# Patient Record
Sex: MALE | Race: BLACK OR AFRICAN AMERICAN | NOT HISPANIC OR LATINO | Employment: FULL TIME | URBAN - METROPOLITAN AREA
[De-identification: names, ages, dates, MRNs, and addresses within clinical notes are randomized per-mention and may not be internally consistent; named-entity substitution may affect disease eponyms.]

---

## 2020-01-31 ENCOUNTER — HOSPITAL ENCOUNTER (OUTPATIENT)
Facility: MEDICAL CENTER | Age: 45
End: 2020-01-31

## 2021-06-18 ENCOUNTER — HOSPITAL ENCOUNTER (OUTPATIENT)
Dept: RADIOLOGY | Facility: MEDICAL CENTER | Age: 46
End: 2021-06-18
Payer: COMMERCIAL

## 2021-06-18 ENCOUNTER — HOSPITAL ENCOUNTER (INPATIENT)
Facility: MEDICAL CENTER | Age: 46
LOS: 5 days | DRG: 291 | End: 2021-06-23
Attending: EMERGENCY MEDICINE | Admitting: STUDENT IN AN ORGANIZED HEALTH CARE EDUCATION/TRAINING PROGRAM
Payer: COMMERCIAL

## 2021-06-18 ENCOUNTER — APPOINTMENT (OUTPATIENT)
Dept: CARDIOLOGY | Facility: MEDICAL CENTER | Age: 46
DRG: 291 | End: 2021-06-18
Attending: EMERGENCY MEDICINE
Payer: COMMERCIAL

## 2021-06-18 DIAGNOSIS — R79.89 ELEVATED D-DIMER: ICD-10-CM

## 2021-06-18 DIAGNOSIS — J96.01 ACUTE RESPIRATORY FAILURE WITH HYPOXIA (HCC): ICD-10-CM

## 2021-06-18 DIAGNOSIS — J96.21 ACUTE ON CHRONIC RESPIRATORY FAILURE WITH HYPOXIA (HCC): ICD-10-CM

## 2021-06-18 DIAGNOSIS — I50.810 RIGHT HEART FAILURE WITH PRESERVED RIGHT VENTRICULAR FUNCTION (HCC): ICD-10-CM

## 2021-06-18 DIAGNOSIS — I50.9 ACUTE CONGESTIVE HEART FAILURE, UNSPECIFIED HEART FAILURE TYPE (HCC): ICD-10-CM

## 2021-06-18 DIAGNOSIS — G47.33 OBSTRUCTIVE SLEEP APNEA: ICD-10-CM

## 2021-06-18 LAB
AMPHET UR QL SCN: NEGATIVE
ANION GAP SERPL CALC-SCNC: 11 MMOL/L (ref 7–16)
APPEARANCE UR: CLEAR
BARBITURATES UR QL SCN: NEGATIVE
BASOPHILS # BLD AUTO: 0.4 % (ref 0–1.8)
BASOPHILS # BLD: 0.04 K/UL (ref 0–0.12)
BENZODIAZ UR QL SCN: NEGATIVE
BILIRUB UR QL STRIP.AUTO: NEGATIVE
BUN SERPL-MCNC: 16 MG/DL (ref 8–22)
BZE UR QL SCN: NEGATIVE
CALCIUM SERPL-MCNC: 8.8 MG/DL (ref 8.5–10.5)
CANNABINOIDS UR QL SCN: POSITIVE
CHLORIDE SERPL-SCNC: 96 MMOL/L (ref 96–112)
CO2 SERPL-SCNC: 34 MMOL/L (ref 20–33)
COLOR UR: YELLOW
CREAT SERPL-MCNC: 1.35 MG/DL (ref 0.5–1.4)
EOSINOPHIL # BLD AUTO: 0 K/UL (ref 0–0.51)
EOSINOPHIL NFR BLD: 0 % (ref 0–6.9)
ERYTHROCYTE [DISTWIDTH] IN BLOOD BY AUTOMATED COUNT: 47.5 FL (ref 35.9–50)
GLUCOSE SERPL-MCNC: 104 MG/DL (ref 65–99)
GLUCOSE UR STRIP.AUTO-MCNC: NEGATIVE MG/DL
HCT VFR BLD AUTO: 46.1 % (ref 42–52)
HGB BLD-MCNC: 14.7 G/DL (ref 14–18)
IMM GRANULOCYTES # BLD AUTO: 0.05 K/UL (ref 0–0.11)
IMM GRANULOCYTES NFR BLD AUTO: 0.4 % (ref 0–0.9)
KETONES UR STRIP.AUTO-MCNC: NEGATIVE MG/DL
LEUKOCYTE ESTERASE UR QL STRIP.AUTO: NEGATIVE
LV EJECT FRACT  99904: 55
LV EJECT FRACT MOD 2C 99903: 55.21
LV EJECT FRACT MOD 4C 99902: 60.95
LV EJECT FRACT MOD BP 99901: 57.61
LYMPHOCYTES # BLD AUTO: 1.76 K/UL (ref 1–4.8)
LYMPHOCYTES NFR BLD: 15.7 % (ref 22–41)
MCH RBC QN AUTO: 27.2 PG (ref 27–33)
MCHC RBC AUTO-ENTMCNC: 31.9 G/DL (ref 33.7–35.3)
MCV RBC AUTO: 85.2 FL (ref 81.4–97.8)
METHADONE UR QL SCN: NEGATIVE
MICRO URNS: NORMAL
MONOCYTES # BLD AUTO: 0.61 K/UL (ref 0–0.85)
MONOCYTES NFR BLD AUTO: 5.5 % (ref 0–13.4)
NEUTROPHILS # BLD AUTO: 8.72 K/UL (ref 1.82–7.42)
NEUTROPHILS NFR BLD: 78 % (ref 44–72)
NITRITE UR QL STRIP.AUTO: NEGATIVE
NRBC # BLD AUTO: 0 K/UL
NRBC BLD-RTO: 0 /100 WBC
OPIATES UR QL SCN: NEGATIVE
OXYCODONE UR QL SCN: NEGATIVE
PCP UR QL SCN: NEGATIVE
PH UR STRIP.AUTO: 5.5 [PH] (ref 5–8)
PLATELET # BLD AUTO: 225 K/UL (ref 164–446)
PMV BLD AUTO: 9.6 FL (ref 9–12.9)
POTASSIUM SERPL-SCNC: 3.9 MMOL/L (ref 3.6–5.5)
PROPOXYPH UR QL SCN: NEGATIVE
PROT UR QL STRIP: NEGATIVE MG/DL
RBC # BLD AUTO: 5.41 M/UL (ref 4.7–6.1)
RBC UR QL AUTO: NEGATIVE
SARS-COV+SARS-COV-2 AG RESP QL IA.RAPID: NOTDETECTED
SODIUM SERPL-SCNC: 141 MMOL/L (ref 135–145)
SP GR UR STRIP.AUTO: 1.01
SPECIMEN SOURCE: NORMAL
TROPONIN T SERPL-MCNC: 29 NG/L (ref 6–19)
UROBILINOGEN UR STRIP.AUTO-MCNC: 0.2 MG/DL
WBC # BLD AUTO: 11.2 K/UL (ref 4.8–10.8)

## 2021-06-18 PROCEDURE — 87426 SARSCOV CORONAVIRUS AG IA: CPT

## 2021-06-18 PROCEDURE — 93306 TTE W/DOPPLER COMPLETE: CPT

## 2021-06-18 PROCEDURE — U0003 INFECTIOUS AGENT DETECTION BY NUCLEIC ACID (DNA OR RNA); SEVERE ACUTE RESPIRATORY SYNDROME CORONAVIRUS 2 (SARS-COV-2) (CORONAVIRUS DISEASE [COVID-19]), AMPLIFIED PROBE TECHNIQUE, MAKING USE OF HIGH THROUGHPUT TECHNOLOGIES AS DESCRIBED BY CMS-2020-01-R: HCPCS

## 2021-06-18 PROCEDURE — 84484 ASSAY OF TROPONIN QUANT: CPT

## 2021-06-18 PROCEDURE — C9803 HOPD COVID-19 SPEC COLLECT: HCPCS | Performed by: EMERGENCY MEDICINE

## 2021-06-18 PROCEDURE — 81003 URINALYSIS AUTO W/O SCOPE: CPT

## 2021-06-18 PROCEDURE — 700117 HCHG RX CONTRAST REV CODE 255: Performed by: EMERGENCY MEDICINE

## 2021-06-18 PROCEDURE — 80048 BASIC METABOLIC PNL TOTAL CA: CPT

## 2021-06-18 PROCEDURE — 80307 DRUG TEST PRSMV CHEM ANLYZR: CPT

## 2021-06-18 PROCEDURE — 85025 COMPLETE CBC W/AUTO DIFF WBC: CPT

## 2021-06-18 PROCEDURE — 93005 ELECTROCARDIOGRAM TRACING: CPT | Performed by: EMERGENCY MEDICINE

## 2021-06-18 PROCEDURE — 700111 HCHG RX REV CODE 636 W/ 250 OVERRIDE (IP): Performed by: STUDENT IN AN ORGANIZED HEALTH CARE EDUCATION/TRAINING PROGRAM

## 2021-06-18 PROCEDURE — 93306 TTE W/DOPPLER COMPLETE: CPT | Mod: 26 | Performed by: INTERNAL MEDICINE

## 2021-06-18 PROCEDURE — U0005 INFEC AGEN DETEC AMPLI PROBE: HCPCS

## 2021-06-18 PROCEDURE — 770020 HCHG ROOM/CARE - TELE (206)

## 2021-06-18 RX ORDER — AMOXICILLIN 250 MG
2 CAPSULE ORAL 2 TIMES DAILY
Status: DISCONTINUED | OUTPATIENT
Start: 2021-06-18 | End: 2021-06-23 | Stop reason: HOSPADM

## 2021-06-18 RX ORDER — LABETALOL HYDROCHLORIDE 5 MG/ML
10 INJECTION, SOLUTION INTRAVENOUS EVERY 4 HOURS PRN
Status: DISCONTINUED | OUTPATIENT
Start: 2021-06-18 | End: 2021-06-23 | Stop reason: HOSPADM

## 2021-06-18 RX ORDER — ENALAPRILAT 1.25 MG/ML
1.25 INJECTION INTRAVENOUS EVERY 6 HOURS PRN
Status: DISCONTINUED | OUTPATIENT
Start: 2021-06-18 | End: 2021-06-19

## 2021-06-18 RX ORDER — CARVEDILOL 6.25 MG/1
6.25 TABLET ORAL 2 TIMES DAILY WITH MEALS
Status: DISCONTINUED | OUTPATIENT
Start: 2021-06-19 | End: 2021-06-23 | Stop reason: HOSPADM

## 2021-06-18 RX ORDER — BISACODYL 10 MG
10 SUPPOSITORY, RECTAL RECTAL
Status: DISCONTINUED | OUTPATIENT
Start: 2021-06-18 | End: 2021-06-23 | Stop reason: HOSPADM

## 2021-06-18 RX ORDER — ONDANSETRON 2 MG/ML
4 INJECTION INTRAMUSCULAR; INTRAVENOUS EVERY 4 HOURS PRN
Status: DISCONTINUED | OUTPATIENT
Start: 2021-06-18 | End: 2021-06-23 | Stop reason: HOSPADM

## 2021-06-18 RX ORDER — PROMETHAZINE HYDROCHLORIDE 25 MG/1
12.5-25 TABLET ORAL EVERY 4 HOURS PRN
Status: DISCONTINUED | OUTPATIENT
Start: 2021-06-18 | End: 2021-06-23 | Stop reason: HOSPADM

## 2021-06-18 RX ORDER — ENALAPRIL MALEATE 5 MG/1
5 TABLET ORAL TWICE DAILY
Status: DISCONTINUED | OUTPATIENT
Start: 2021-06-18 | End: 2021-06-18

## 2021-06-18 RX ORDER — PROCHLORPERAZINE EDISYLATE 5 MG/ML
5-10 INJECTION INTRAMUSCULAR; INTRAVENOUS EVERY 4 HOURS PRN
Status: DISCONTINUED | OUTPATIENT
Start: 2021-06-18 | End: 2021-06-23 | Stop reason: HOSPADM

## 2021-06-18 RX ORDER — NITROGLYCERIN 0.4 MG/1
1.2 TABLET SUBLINGUAL
Status: DISCONTINUED | OUTPATIENT
Start: 2021-06-18 | End: 2021-06-19

## 2021-06-18 RX ORDER — ACETAMINOPHEN 325 MG/1
650 TABLET ORAL EVERY 6 HOURS PRN
Status: DISCONTINUED | OUTPATIENT
Start: 2021-06-18 | End: 2021-06-23 | Stop reason: HOSPADM

## 2021-06-18 RX ORDER — CLONIDINE HYDROCHLORIDE 0.1 MG/1
0.1 TABLET ORAL EVERY 6 HOURS PRN
Status: DISCONTINUED | OUTPATIENT
Start: 2021-06-18 | End: 2021-06-19

## 2021-06-18 RX ORDER — POLYETHYLENE GLYCOL 3350 17 G/17G
1 POWDER, FOR SOLUTION ORAL
Status: DISCONTINUED | OUTPATIENT
Start: 2021-06-18 | End: 2021-06-23 | Stop reason: HOSPADM

## 2021-06-18 RX ORDER — FUROSEMIDE 10 MG/ML
40 INJECTION INTRAMUSCULAR; INTRAVENOUS
Status: DISCONTINUED | OUTPATIENT
Start: 2021-06-18 | End: 2021-06-19

## 2021-06-18 RX ORDER — PROMETHAZINE HYDROCHLORIDE 25 MG/1
12.5-25 SUPPOSITORY RECTAL EVERY 4 HOURS PRN
Status: DISCONTINUED | OUTPATIENT
Start: 2021-06-18 | End: 2021-06-23 | Stop reason: HOSPADM

## 2021-06-18 RX ORDER — ONDANSETRON 4 MG/1
4 TABLET, ORALLY DISINTEGRATING ORAL EVERY 4 HOURS PRN
Status: DISCONTINUED | OUTPATIENT
Start: 2021-06-18 | End: 2021-06-23 | Stop reason: HOSPADM

## 2021-06-18 RX ADMIN — HUMAN ALBUMIN MICROSPHERES AND PERFLUTREN 3 ML: 10; .22 INJECTION, SOLUTION INTRAVENOUS at 22:37

## 2021-06-18 RX ADMIN — FUROSEMIDE 40 MG: 10 INJECTION, SOLUTION INTRAMUSCULAR; INTRAVENOUS at 23:38

## 2021-06-18 RX ADMIN — ENOXAPARIN SODIUM 60 MG: 60 INJECTION SUBCUTANEOUS at 23:38

## 2021-06-18 ASSESSMENT — LIFESTYLE VARIABLES
AVERAGE NUMBER OF DAYS PER WEEK YOU HAVE A DRINK CONTAINING ALCOHOL: 0
DO YOU DRINK ALCOHOL: YES
TOTAL SCORE: 0
HAVE YOU EVER FELT YOU SHOULD CUT DOWN ON YOUR DRINKING: NO
HAVE PEOPLE ANNOYED YOU BY CRITICIZING YOUR DRINKING: NO
EVER FELT BAD OR GUILTY ABOUT YOUR DRINKING: NO
TOTAL SCORE: 0
EVER HAD A DRINK FIRST THING IN THE MORNING TO STEADY YOUR NERVES TO GET RID OF A HANGOVER: NO
ON A TYPICAL DAY WHEN YOU DRINK ALCOHOL HOW MANY DRINKS DO YOU HAVE: 0
CONSUMPTION TOTAL: NEGATIVE
TOTAL SCORE: 0
HOW MANY TIMES IN THE PAST YEAR HAVE YOU HAD 5 OR MORE DRINKS IN A DAY: 0
DOES PATIENT WANT TO STOP DRINKING: NO

## 2021-06-18 ASSESSMENT — PAIN DESCRIPTION - PAIN TYPE
TYPE: ACUTE PAIN
TYPE: ACUTE PAIN

## 2021-06-19 ENCOUNTER — APPOINTMENT (OUTPATIENT)
Dept: RADIOLOGY | Facility: MEDICAL CENTER | Age: 46
DRG: 291 | End: 2021-06-19
Attending: STUDENT IN AN ORGANIZED HEALTH CARE EDUCATION/TRAINING PROGRAM
Payer: COMMERCIAL

## 2021-06-19 PROBLEM — I50.810: Status: ACTIVE | Noted: 2021-06-19

## 2021-06-19 PROBLEM — G47.33 OBSTRUCTIVE SLEEP APNEA: Status: ACTIVE | Noted: 2021-06-19

## 2021-06-19 PROBLEM — N17.9 AKI (ACUTE KIDNEY INJURY) (HCC): Status: ACTIVE | Noted: 2021-06-19

## 2021-06-19 PROBLEM — R79.89 ELEVATED D-DIMER: Status: RESOLVED | Noted: 2021-06-19 | Resolved: 2021-06-19

## 2021-06-19 PROBLEM — E66.01 MORBID OBESITY (HCC): Status: ACTIVE | Noted: 2021-06-19

## 2021-06-19 PROBLEM — I10 HYPERTENSION: Status: ACTIVE | Noted: 2021-06-19

## 2021-06-19 PROBLEM — R79.89 ELEVATED D-DIMER: Status: ACTIVE | Noted: 2021-06-19

## 2021-06-19 LAB
ALBUMIN SERPL BCP-MCNC: 3.4 G/DL (ref 3.2–4.9)
ALBUMIN/GLOB SERPL: 1 G/DL
ALP SERPL-CCNC: 73 U/L (ref 30–99)
ALT SERPL-CCNC: 32 U/L (ref 2–50)
ANION GAP SERPL CALC-SCNC: 9 MMOL/L (ref 7–16)
AST SERPL-CCNC: 31 U/L (ref 12–45)
BASOPHILS # BLD AUTO: 0.5 % (ref 0–1.8)
BASOPHILS # BLD: 0.06 K/UL (ref 0–0.12)
BILIRUB SERPL-MCNC: 1.5 MG/DL (ref 0.1–1.5)
BUN SERPL-MCNC: 15 MG/DL (ref 8–22)
CALCIUM SERPL-MCNC: 9.2 MG/DL (ref 8.5–10.5)
CHLORIDE SERPL-SCNC: 97 MMOL/L (ref 96–112)
CHOLEST SERPL-MCNC: 110 MG/DL (ref 100–199)
CO2 SERPL-SCNC: 35 MMOL/L (ref 20–33)
CREAT SERPL-MCNC: 1.28 MG/DL (ref 0.5–1.4)
D DIMER PPP IA.FEU-MCNC: 2.93 UG/ML (FEU) (ref 0–0.5)
EOSINOPHIL # BLD AUTO: 0.01 K/UL (ref 0–0.51)
EOSINOPHIL NFR BLD: 0.1 % (ref 0–6.9)
ERYTHROCYTE [DISTWIDTH] IN BLOOD BY AUTOMATED COUNT: 47.7 FL (ref 35.9–50)
GLOBULIN SER CALC-MCNC: 3.4 G/DL (ref 1.9–3.5)
GLUCOSE SERPL-MCNC: 111 MG/DL (ref 65–99)
HCT VFR BLD AUTO: 47.6 % (ref 42–52)
HDLC SERPL-MCNC: 44 MG/DL
HGB BLD-MCNC: 14.8 G/DL (ref 14–18)
IMM GRANULOCYTES # BLD AUTO: 0.05 K/UL (ref 0–0.11)
IMM GRANULOCYTES NFR BLD AUTO: 0.4 % (ref 0–0.9)
LDLC SERPL CALC-MCNC: 51 MG/DL
LYMPHOCYTES # BLD AUTO: 2.68 K/UL (ref 1–4.8)
LYMPHOCYTES NFR BLD: 22.5 % (ref 22–41)
MAGNESIUM SERPL-MCNC: 1.8 MG/DL (ref 1.5–2.5)
MCH RBC QN AUTO: 26.5 PG (ref 27–33)
MCHC RBC AUTO-ENTMCNC: 31.1 G/DL (ref 33.7–35.3)
MCV RBC AUTO: 85.3 FL (ref 81.4–97.8)
MONOCYTES # BLD AUTO: 0.81 K/UL (ref 0–0.85)
MONOCYTES NFR BLD AUTO: 6.8 % (ref 0–13.4)
NEUTROPHILS # BLD AUTO: 8.3 K/UL (ref 1.82–7.42)
NEUTROPHILS NFR BLD: 69.7 % (ref 44–72)
NRBC # BLD AUTO: 0 K/UL
NRBC BLD-RTO: 0 /100 WBC
NT-PROBNP SERPL IA-MCNC: 2200 PG/ML (ref 0–125)
PLATELET # BLD AUTO: 227 K/UL (ref 164–446)
PMV BLD AUTO: 9.5 FL (ref 9–12.9)
POTASSIUM SERPL-SCNC: 3.7 MMOL/L (ref 3.6–5.5)
PROT SERPL-MCNC: 6.8 G/DL (ref 6–8.2)
RBC # BLD AUTO: 5.58 M/UL (ref 4.7–6.1)
SARS-COV-2 RNA RESP QL NAA+PROBE: NOTDETECTED
SODIUM SERPL-SCNC: 141 MMOL/L (ref 135–145)
SPECIMEN SOURCE: NORMAL
TRIGL SERPL-MCNC: 73 MG/DL (ref 0–149)
TROPONIN T SERPL-MCNC: 29 NG/L (ref 6–19)
TSH SERPL DL<=0.005 MIU/L-ACNC: 2.84 UIU/ML (ref 0.38–5.33)
WBC # BLD AUTO: 11.9 K/UL (ref 4.8–10.8)

## 2021-06-19 PROCEDURE — 700111 HCHG RX REV CODE 636 W/ 250 OVERRIDE (IP): Performed by: STUDENT IN AN ORGANIZED HEALTH CARE EDUCATION/TRAINING PROGRAM

## 2021-06-19 PROCEDURE — 83880 ASSAY OF NATRIURETIC PEPTIDE: CPT

## 2021-06-19 PROCEDURE — 99285 EMERGENCY DEPT VISIT HI MDM: CPT

## 2021-06-19 PROCEDURE — A9270 NON-COVERED ITEM OR SERVICE: HCPCS | Performed by: STUDENT IN AN ORGANIZED HEALTH CARE EDUCATION/TRAINING PROGRAM

## 2021-06-19 PROCEDURE — 84443 ASSAY THYROID STIM HORMONE: CPT

## 2021-06-19 PROCEDURE — 85379 FIBRIN DEGRADATION QUANT: CPT

## 2021-06-19 PROCEDURE — 84484 ASSAY OF TROPONIN QUANT: CPT

## 2021-06-19 PROCEDURE — 700117 HCHG RX CONTRAST REV CODE 255: Performed by: STUDENT IN AN ORGANIZED HEALTH CARE EDUCATION/TRAINING PROGRAM

## 2021-06-19 PROCEDURE — 5A09357 ASSISTANCE WITH RESPIRATORY VENTILATION, LESS THAN 24 CONSECUTIVE HOURS, CONTINUOUS POSITIVE AIRWAY PRESSURE: ICD-10-PCS | Performed by: STUDENT IN AN ORGANIZED HEALTH CARE EDUCATION/TRAINING PROGRAM

## 2021-06-19 PROCEDURE — 94660 CPAP INITIATION&MGMT: CPT

## 2021-06-19 PROCEDURE — 80053 COMPREHEN METABOLIC PANEL: CPT

## 2021-06-19 PROCEDURE — 99221 1ST HOSP IP/OBS SF/LOW 40: CPT | Performed by: STUDENT IN AN ORGANIZED HEALTH CARE EDUCATION/TRAINING PROGRAM

## 2021-06-19 PROCEDURE — 94640 AIRWAY INHALATION TREATMENT: CPT

## 2021-06-19 PROCEDURE — 700102 HCHG RX REV CODE 250 W/ 637 OVERRIDE(OP): Performed by: STUDENT IN AN ORGANIZED HEALTH CARE EDUCATION/TRAINING PROGRAM

## 2021-06-19 PROCEDURE — 83735 ASSAY OF MAGNESIUM: CPT

## 2021-06-19 PROCEDURE — 80061 LIPID PANEL: CPT

## 2021-06-19 PROCEDURE — 36415 COLL VENOUS BLD VENIPUNCTURE: CPT

## 2021-06-19 PROCEDURE — 85025 COMPLETE CBC W/AUTO DIFF WBC: CPT

## 2021-06-19 PROCEDURE — 71275 CT ANGIOGRAPHY CHEST: CPT

## 2021-06-19 PROCEDURE — 94760 N-INVAS EAR/PLS OXIMETRY 1: CPT

## 2021-06-19 PROCEDURE — 770020 HCHG ROOM/CARE - TELE (206)

## 2021-06-19 RX ORDER — LISINOPRIL AND HYDROCHLOROTHIAZIDE 25; 20 MG/1; MG/1
1 TABLET ORAL DAILY
COMMUNITY
End: 2022-04-25 | Stop reason: SDUPTHER

## 2021-06-19 RX ORDER — SPIRONOLACTONE 25 MG/1
25 TABLET ORAL DAILY
Status: ON HOLD | COMMUNITY
End: 2021-06-20

## 2021-06-19 RX ORDER — AMLODIPINE BESYLATE 5 MG/1
5 TABLET ORAL
Status: DISCONTINUED | OUTPATIENT
Start: 2021-06-19 | End: 2021-06-22

## 2021-06-19 RX ORDER — FLUTICASONE PROPIONATE 50 MCG
2 SPRAY, SUSPENSION (ML) NASAL DAILY
COMMUNITY
End: 2022-12-07 | Stop reason: SDUPTHER

## 2021-06-19 RX ORDER — AMOXICILLIN 500 MG/1
500 CAPSULE ORAL 3 TIMES DAILY
Status: ON HOLD | COMMUNITY
Start: 2021-06-08 | End: 2021-06-20

## 2021-06-19 RX ORDER — CARVEDILOL 3.12 MG/1
3.12 TABLET ORAL 2 TIMES DAILY WITH MEALS
Status: ON HOLD | COMMUNITY
End: 2021-06-20

## 2021-06-19 RX ORDER — ALBUTEROL SULFATE 90 UG/1
2 AEROSOL, METERED RESPIRATORY (INHALATION)
Status: DISCONTINUED | OUTPATIENT
Start: 2021-06-19 | End: 2021-06-19

## 2021-06-19 RX ORDER — ALBUTEROL SULFATE 90 UG/1
2 AEROSOL, METERED RESPIRATORY (INHALATION)
Status: DISCONTINUED | OUTPATIENT
Start: 2021-06-19 | End: 2021-06-23 | Stop reason: HOSPADM

## 2021-06-19 RX ORDER — FUROSEMIDE 20 MG/1
20 TABLET ORAL DAILY
Status: ON HOLD | COMMUNITY
End: 2021-06-23

## 2021-06-19 RX ORDER — AMLODIPINE BESYLATE 10 MG/1
10 TABLET ORAL DAILY
COMMUNITY
End: 2022-04-25 | Stop reason: SDUPTHER

## 2021-06-19 RX ORDER — FUROSEMIDE 10 MG/ML
40 INJECTION INTRAMUSCULAR; INTRAVENOUS
Status: DISCONTINUED | OUTPATIENT
Start: 2021-06-19 | End: 2021-06-21

## 2021-06-19 RX ORDER — TIOTROPIUM BROMIDE 18 UG/1
18 CAPSULE ORAL; RESPIRATORY (INHALATION) DAILY
COMMUNITY
End: 2023-08-30

## 2021-06-19 RX ORDER — FUROSEMIDE 10 MG/ML
40 INJECTION INTRAMUSCULAR; INTRAVENOUS
Status: DISCONTINUED | OUTPATIENT
Start: 2021-06-20 | End: 2021-06-19

## 2021-06-19 RX ADMIN — CLONIDINE HYDROCHLORIDE 0.1 MG: 0.1 TABLET ORAL at 03:50

## 2021-06-19 RX ADMIN — AMLODIPINE BESYLATE 5 MG: 5 TABLET ORAL at 18:05

## 2021-06-19 RX ADMIN — CARVEDILOL 6.25 MG: 6.25 TABLET, FILM COATED ORAL at 08:33

## 2021-06-19 RX ADMIN — ALBUTEROL SULFATE 2 PUFF: 90 AEROSOL, METERED RESPIRATORY (INHALATION) at 14:52

## 2021-06-19 RX ADMIN — CARVEDILOL 6.25 MG: 6.25 TABLET, FILM COATED ORAL at 18:04

## 2021-06-19 RX ADMIN — FUROSEMIDE 40 MG: 10 INJECTION, SOLUTION INTRAMUSCULAR; INTRAVENOUS at 11:56

## 2021-06-19 RX ADMIN — IOHEXOL 80 ML: 350 INJECTION, SOLUTION INTRAVENOUS at 14:07

## 2021-06-19 RX ADMIN — ENOXAPARIN SODIUM 60 MG: 60 INJECTION SUBCUTANEOUS at 11:55

## 2021-06-19 ASSESSMENT — COGNITIVE AND FUNCTIONAL STATUS - GENERAL
MOVING TO AND FROM BED TO CHAIR: A LOT
TURNING FROM BACK TO SIDE WHILE IN FLAT BAD: A LITTLE
MOVING FROM LYING ON BACK TO SITTING ON SIDE OF FLAT BED: A LITTLE
CLIMB 3 TO 5 STEPS WITH RAILING: A LOT
DAILY ACTIVITIY SCORE: 19
DRESSING REGULAR UPPER BODY CLOTHING: A LITTLE
HELP NEEDED FOR BATHING: A LITTLE
SUGGESTED CMS G CODE MODIFIER MOBILITY: CL
TOILETING: A LITTLE
WALKING IN HOSPITAL ROOM: A LOT
SUGGESTED CMS G CODE MODIFIER DAILY ACTIVITY: CK
STANDING UP FROM CHAIR USING ARMS: A LOT
DRESSING REGULAR LOWER BODY CLOTHING: A LOT
MOBILITY SCORE: 14

## 2021-06-19 ASSESSMENT — COPD QUESTIONNAIRES
HAVE YOU SMOKED AT LEAST 100 CIGARETTES IN YOUR ENTIRE LIFE: NO/DON'T KNOW
DO YOU EVER COUGH UP ANY MUCUS OR PHLEGM?: NO/ONLY WITH OCCASIONAL COLDS OR INFECTIONS
COPD SCREENING SCORE: 1
DURING THE PAST 4 WEEKS HOW MUCH DID YOU FEEL SHORT OF BREATH: SOME OF THE TIME

## 2021-06-19 ASSESSMENT — LIFESTYLE VARIABLES: EVER_SMOKED: NEVER

## 2021-06-19 ASSESSMENT — PATIENT HEALTH QUESTIONNAIRE - PHQ9
1. LITTLE INTEREST OR PLEASURE IN DOING THINGS: NOT AT ALL
SUM OF ALL RESPONSES TO PHQ9 QUESTIONS 1 AND 2: 0
2. FEELING DOWN, DEPRESSED, IRRITABLE, OR HOPELESS: NOT AT ALL

## 2021-06-19 ASSESSMENT — ENCOUNTER SYMPTOMS
COUGH: 0
ORTHOPNEA: 0
TREMORS: 0
MYALGIAS: 0
SHORTNESS OF BREATH: 1
DIARRHEA: 0
HEADACHES: 0
VOMITING: 0
CONSTIPATION: 0
WEAKNESS: 0
ORTHOPNEA: 1
ABDOMINAL PAIN: 1
DOUBLE VISION: 0
PALPITATIONS: 0
NAUSEA: 0
SPUTUM PRODUCTION: 0
DIZZINESS: 0
FEVER: 0
CHILLS: 0
EYE PAIN: 0
SINUS PAIN: 0
ABDOMINAL PAIN: 0
HEMOPTYSIS: 0
BLURRED VISION: 0
FOCAL WEAKNESS: 0

## 2021-06-19 ASSESSMENT — FIBROSIS 4 INDEX: FIB4 SCORE: 1.09

## 2021-06-19 NOTE — CARE PLAN
The patient is Stable - Low risk of patient condition declining or worsening         Progress made toward(s) clinical / shift goals:  Progressing    Patient is not progressing towards the following goals:      Problem: Pain - Standard  Goal: Alleviation of pain or a reduction in pain to the patient’s comfort goal  Outcome: Progressing     Problem: Care Map:  Admission Optimal Outcome for the Heart Failure Patient  Goal: Admission:  Optimal Care of the heart failure patient  Outcome: Progressing     Problem: Care Map:  Day 1 Optimal Outcome for the Heart Failure Patient  Goal: Day 1:  Optimal Care of the heart failure patient  Outcome: Progressing

## 2021-06-19 NOTE — PROGRESS NOTES
2 RN skin check complete with Oliva CORTEZ.   Devices in place silicone nasal cannula.  Skin assessed under devices intact.  Confirmed pressure ulcers found on none.  New potential pressure ulcers noted on none.  The following interventions in place: Pillows in use for support and positioning, bariatric bed in use. Silicone nasal cannula in use. Pt turns self from side to side.    Ears intact and blanching.  Elbows intact and blanching.  Sacrum intact and blanching.  Pannus intact, moist.  Heels and toes dry, intact and blanching. Calves dusky.

## 2021-06-19 NOTE — PROGRESS NOTES
Bedside report received from Lula TAYLOR POC discussed with pt; all questions answered at this time.

## 2021-06-19 NOTE — ED NOTES
Med Rec unable to be obtained: per pt at bedside. Pt is unable to name the medications or doses that are used. He is attempting to get in contact with someone at home to verify.    Will call [facility] when they open for verification.     Preferred Pharmacy: [facility], [place] P: [phone]    Pt confirmed following allergies:  No Known Allergies

## 2021-06-19 NOTE — ASSESSMENT & PLAN NOTE
BMI of 67 on transfer. Weight likely 30lb/15kg over due to volume status.  -counseled on dietary/exercise  -PT/OT recs : home health

## 2021-06-19 NOTE — ED PROVIDER NOTES
ED Provider Note    Scribed for Segundo Mc M.D. by Inderjit Zuniga. 6/18/2021, 8:55 PM.    Primary care provider: No primary care provider noted.  Means of arrival: EMS  History obtained from: Patient and EMS  History limited by: None    CHIEF COMPLAINT  Chief Complaint   Patient presents with   • Shortness of Breath       HPI  Shelli Moreira is a 45 y.o. male with a history of CHF, hypertension, and sleep apnea who presents to the Emergency Department BIB EMS transferred from [facility]. The patient presented to [facility] complaining of shortness of breath ongoing for about six months and acutely worsening in the last two months. The patient was advised to present to the ED by his wife when the patient became acutely short of breath today. Patient had an unremarkable EKG at [facility]. Labs were notable for elevated BNP and D-dimer. Creatinine at [facility] was also elevated and indicative of acute renal failure. [facility] staff administered Hydralazine, Lasix, and Lovenox. CTs were not obtained due to patient's BMI.  Currently, the patient states he is feeling better but has not tried to ambulate since he left [facility]. The patient also reports left lower abdominal pain and diarrhea which started about one week ago and worsened today. Patient denies any palpitations, chills, or sweats. He does note some centralized chest pain which he rates 6/10. He denies a history of previous chest pain, MI, diabetes, PE, or DVT. Patient notes he gained about 30 pounds from fluid in the past two weeks and has had worsening edema and anasarca for three months. He denies a history of MI. He states he was not informed he has CHF until very recently. He denies any cough, sore throat, or nausea. Patient also notes bilateral flank pain. Patient vapes marijuana but does not smoke tobacco. He is currently not on [place] O2.    REVIEW OF SYSTEMS  Pertinent positives include chest pain, abdominal pain, shortness of breath, flank  "pain, bilateral lower extremity swelling, weight gain.   Pertinent negatives include no palpitations, chills, sweats, nausea, vomiting, cough, sore throat.    All other systems reviewed and negative.      PAST MEDICAL HISTORY   has a past medical history of Asthma, Congestive heart failure (HCC), and Hypertension.    SOCIAL HISTORY  Social History     Tobacco Use   • Smoking status: Never Smoker   • Smokeless tobacco: Never Used   Vaping Use   • Vaping Use: Every day   • Substances: THC   • Devices: Disposable   Substance and Sexual Activity   • Alcohol use: Yes     Comment: rare    • Drug use: Yes     Types: Inhaled     Comment: THC   • Sexual activity: Not on file       FAMILY HISTORY  Mother has a history of diabetes.     CURRENT MEDICATIONS  Reviewed.  See Encounter Summary.   No current outpatient medications      ALLERGIES  No Known Allergies    PHYSICAL EXAM  VITAL SIGNS: BP (!) 162/86   Pulse (!) 117   Temp 37.8 °C (100 °F) (Temporal)   Resp 20   Ht 1.626 m (5' 4\")   Wt (!) 176 kg (387 lb)   SpO2 98%   BMI 66.43 kg/m² tachycardic, borderline tachypneic, no hypoxia on supplemental oxygen  Constitutional: Well developed, Elevated BMI,  No acute distress, Able to answer questions.   HENT: Moist mucous membranes, Nose is normal in appearance without rhinorrhea, external ears are normal  Eyes: Anicteric,  pupils are equal round and reactive, there is no conjunctival drainage or pallor   Neck: The trachea is midline, there is no obvious mass or meningeal signs  Cardiovascular:  Normal perfusion.  Tachycardic, normal rhythm without murmurs or rubs. Possible gallop. Anasarca from lower extremities up to the xyphoid process of the sternum. No JVD.  Thorax & Lungs: Respiratory rate and effort are normal. Decreased breath sounds throughout. No wheezes rhonchi or rales noted.  Abdomen: Abdomen is normal in appearance, no gross peritoneal signs  normal bowel sounds, no pain with cough, " nonpulsatile  Musculoskeletal: No tenderness to palpation or obvious deformity.  Skin: Skin is warm, visualized skin shows no erythema, no rash.  Neurologic:  Cranial nerves II through XII are intact there is no focal abnormality noted.  Psychiatric: Normal mood and mentation.    Chest x-ray: From outlying facility demonstrated cardiomegaly without pulmonary edema.  Films reviewed by me.    LABS  CBC WITH DIFFERENTIAL   Result Value Ref Range    WBC 11.2 (H) 4.8 - 10.8 K/uL    RBC 5.41 4.70 - 6.10 M/uL    Hemoglobin 14.7 14.0 - 18.0 g/dL    Hematocrit 46.1 42.0 - 52.0 %    MCV 85.2 81.4 - 97.8 fL    MCH 27.2 27.0 - 33.0 pg    MCHC 31.9 (L) 33.7 - 35.3 g/dL    RDW 47.5 35.9 - 50.0 fL    Platelet Count 225 164 - 446 K/uL    MPV 9.6 9.0 - 12.9 fL    Neutrophils-Polys 78.00 (H) 44.00 - 72.00 %    Lymphocytes 15.70 (L) 22.00 - 41.00 %    Monocytes 5.50 0.00 - 13.40 %    Eosinophils 0.00 0.00 - 6.90 %    Basophils 0.40 0.00 - 1.80 %    Immature Granulocytes 0.40 0.00 - 0.90 %    Nucleated RBC 0.00 /100 WBC    Neutrophils (Absolute) 8.72 (H) 1.82 - 7.42 K/uL    Lymphs (Absolute) 1.76 1.00 - 4.80 K/uL    Monos (Absolute) 0.61 0.00 - 0.85 K/uL    Eos (Absolute) 0.00 0.00 - 0.51 K/uL    Baso (Absolute) 0.04 0.00 - 0.12 K/uL    Immature Granulocytes (abs) 0.05 0.00 - 0.11 K/uL    NRBC (Absolute) 0.00 K/uL   Basic Metabolic Panel   Result Value Ref Range    Sodium 141 135 - 145 mmol/L    Potassium 3.9 3.6 - 5.5 mmol/L    Chloride 96 96 - 112 mmol/L    Co2 34 (H) 20 - 33 mmol/L    Glucose 104 (H) 65 - 99 mg/dL    Bun 16 8 - 22 mg/dL    Creatinine 1.35 0.50 - 1.40 mg/dL    Calcium 8.8 8.5 - 10.5 mg/dL    Anion Gap 11.0 7.0 - 16.0   URINALYSIS    Specimen: Urine   Result Value Ref Range    Color Yellow     Character Clear     Specific Gravity 1.007 <1.035    Ph 5.5 5.0 - 8.0    Glucose Negative Negative mg/dL    Ketones Negative Negative mg/dL    Protein Negative Negative mg/dL    Bilirubin Negative Negative    Urobilinogen, Urine  0.2 Negative    Nitrite Negative Negative    Leukocyte Esterase Negative Negative    Occult Blood Negative Negative    Micro Urine Req see below    TROPONIN   Result Value Ref Range    Troponin T 29 (H) 6 - 19 ng/L   SARS-COV Antigen YESY: Collect dry nasal swab AND NP swab in VTM   Result Value Ref Range    SARS-CoV-2 Source Nasal Swab     SARS-COV ANTIGEN YESY NotDetected Not-Detected   SARS-CoV-2 PCR (24 hour In-House): Collect NP swab in VTM    Specimen: Respirate   Result Value Ref Range    SARS-CoV-2 Source NP Swab    URINE DRUG SCREEN   Result Value Ref Range    Amphetamines Urine Negative Negative    Barbiturates Negative Negative    Benzodiazepines Negative Negative    Cocaine Metabolite Negative Negative    Methadone Negative Negative    Opiates Negative Negative    Oxycodone Negative Negative    Phencyclidine -Pcp Negative Negative    Propoxyphene Negative Negative    Cannabinoid Metab Positive (A) Negative   Brain natruretic peptide 2500 from the outlying facility     All labs reviewed by me.     EKG   Interpreted by me    Rhythm: Sinus tachycardia  Rate: 116  Ectopy: none  Conduction: normal  ST Segments: no acute change  T Waves: no acute change  Q Waves: none  Clinical Impression: Sinus tachycardia  Comparison: No previous EKG to compare     RADIOLOGY   OUTSIDE IMAGES-DX CHEST   Final Result      EC-ECHOCARDIOGRAM COMPLETE W/O CONT    (Results Pending)      The radiologist's interpretations of all radiological studies have been reviewed by me.         COURSE & MEDICAL DECISION MAKING  Nursing notes and vital signs were reviewed. (See chart for details)  The patient's medical  records were reviewed, history was obtained from the patient. Patient's medical records from [facility] were reviewed which show the patient had an unremarkable CBC, normal coags, CO2 37, Creatinine 1.7, BUN 21, BNP 2560, and D-dimer 2.25.    8:55 PM Patient seen and examined at bedside. The patient presents with shortness of  breath and chest pain and the differential diagnosis includes but is not limited to PE, DVT, CHF, . Ordered for EC-Echocardiogram, CBC w/ diff, BMP, UA, Troponin, SARS-CoV-2 PCR, and EKG to evaluate. Patient will be treated with nitroglycerin 1.2 mg tablet for his symptoms.    9:28 PM - Paged Hospitalist.    9:48 PM I discussed the patient's case and the above findings with Dr. Prescott (Hospitalist) who agreed to evaluate the patient for hospitalization.     This patient presents with anasarca and acute CHF exacerbation.  He had good response to Lasix at the outlying facility producing 3 L of urine.  He was given nitrates here for afterload reduction.  He did not require repeat Lasix in the emergency department.  He has had some chest pain but has a negative troponin.  Require admission for echocardiogram, diuresis, afterload reduction and cardiology consultation.  He has an elevated D-dimer but renal insufficiency will preclude CT imaging.  He is already treated with Lovenox.  Ultrasounds and VQ scan can be obtained although I doubt he actually has a PE.    DISPOSITION:  Patient will be hospitalized by Dr. Prescott in guarded condition.     FINAL IMPRESSION  1. Acute congestive heart failure, unspecified heart failure type (HCC)    2. Elevated d-dimer    3.      Renal insufficiency  4.      Essential hypertension  5.      Anasarca     Inderjit TRAN ([place]), am scribing for, and in the presence of, Segundo Mc M.D..    Electronically signed by: Inderjit Zuniga ([place]), 6/18/2021    Segundo TRAN M.D. personally performed the services described in this documentation, as scribed by Inderjit Zuniga in my presence, and it is both accurate and complete.    I independently evaluated the patient and repeated the important components of the history and physical. I discussed the management with the resident. I have reviewed and agree with the pertinent clinical information above including history, exam, study  findings, and recommendations.    C.    The note accurately reflects work and decisions made by me.  Segundo Mc M.D.  6/18/2021  11:58 PM

## 2021-06-19 NOTE — PROGRESS NOTES
Med rec completed per pt's home pharmacy   Unknown last doses of medications taken   Pt started a 8 day course of Amoxicillin on 6/8/2021

## 2021-06-19 NOTE — ASSESSMENT & PLAN NOTE
Hx of HTN, may be related to BRANDEN, obesity. On multiple antihypertensives at home, unsure of dosing at this time.  -Coreg 6.25mg PO BID  -STOPPED Lasix 40mg IV BID diuretic  -Restart amlodipine 10mg PO qD  -restart lisinopril/HCTZ 20/25mg PO qD  -NO NEED for coreg, spironolactone on discharge

## 2021-06-19 NOTE — ASSESSMENT & PLAN NOTE
Patient with shortness of breath and elevated D-dimer  CTA not obtained due to SHARITA  Received therapeutic Lovenox  Follow-up VQ scan

## 2021-06-19 NOTE — ASSESSMENT & PLAN NOTE
Transferred from [facility] with reports of SHARITA, Cr 1.3, baseline Cr appears to be 1.2.  -STOPPED 40mg IV BID diuretic  -BMP repeat AM

## 2021-06-19 NOTE — PROGRESS NOTES
Daily Progress Note:     Date of Service: 6/19/2021  Primary Team: [employer] IM White Team   Attending: Maylin Hamilton M.D.   Senior Resident: Dr. Braswell  Intern: Dr. Turpin  Contact:  [phone]    Chief Complaint:   Heart failure, shortness of breath    Subjective:   Transferred overnight from [facility] for heart failure exacerbation. Reported to have diuresed 3L prior to transfer, with additional 2L overnight.  Today: Short of breath, stable to mildly improved from yesterday. Diarrhoea of 6x yesterday (no hematochezia, loose, not watery), but loose stools have since stopped. Denies chest pain, abd pain, nausea/vomiting. States able to walk approx. 20 ft before feeling winded, previously tolerated ~100+ ft.    Consultants/Specialty:    Review of Systems:    Review of Systems   Constitutional: Negative for chills and fever.   Eyes: Negative for blurred vision and double vision.   Respiratory: Positive for shortness of breath. Negative for cough and sputum production.    Cardiovascular: Positive for orthopnea and leg swelling. Negative for chest pain and palpitations.   Gastrointestinal: Negative for abdominal pain, constipation, diarrhea, nausea and vomiting.   Genitourinary: Negative for dysuria, frequency and urgency.   Neurological: Negative for dizziness, focal weakness, weakness and headaches.       Objective Data:   Physical Exam:   Vitals:   Temp:  [36.3 °C (97.3 °F)-37.8 °C (100 °F)] 36.3 °C (97.3 °F)  Pulse:  [106-117] 106  Resp:  [17-20] 18  BP: (144-171)/() 171/107  SpO2:  [93 %-99 %] 98 %     Physical Exam  Vitals and nursing note reviewed.   Constitutional:       General: He is not in acute distress.     Appearance: He is not ill-appearing or diaphoretic.   HENT:      Head: Normocephalic and atraumatic.      Mouth/Throat:      Mouth: Mucous membranes are dry.      Pharynx: Oropharynx is clear.   Eyes:      Extraocular Movements: Extraocular movements intact.      Pupils: Pupils are equal,  round, and reactive to light.   Cardiovascular:      Rate and Rhythm: Normal rate and regular rhythm.      Heart sounds: No murmur heard.   No friction rub. No gallop.       Comments: Bilateral pitting edema 3+, with flank edema 2+  Pulmonary:      Effort: Pulmonary effort is normal.      Breath sounds: Normal breath sounds. No wheezing, rhonchi or rales.   Abdominal:      General: There is no distension.      Palpations: Abdomen is soft.      Tenderness: There is no abdominal tenderness. There is no guarding or rebound.   Skin:     General: Skin is warm and dry.   Neurological:      Mental Status: He is alert and oriented to person, place, and time. Mental status is at baseline.      Cranial Nerves: No cranial nerve deficit.      Motor: No weakness.   Psychiatric:         Mood and Affect: Mood normal.         Behavior: Behavior normal.         Thought Content: Thought content normal.         Judgment: Judgment normal.           Labs:   Lab Results   Component Value Date/Time    SODIUM 141 06/19/2021 12:45 AM    POTASSIUM 3.7 06/19/2021 12:45 AM    CHLORIDE 97 06/19/2021 12:45 AM    CO2 35 (H) 06/19/2021 12:45 AM    GLUCOSE 111 (H) 06/19/2021 12:45 AM    BUN 15 06/19/2021 12:45 AM    CREATININE 1.28 06/19/2021 12:45 AM        Recent Labs     06/18/21  2128 06/19/21  0045   WBC 11.2* 11.9*   RBC 5.41 5.58   HEMOGLOBIN 14.7 14.8   HEMATOCRIT 46.1 47.6   MCV 85.2 85.3   MCH 27.2 26.5*   RDW 47.5 47.7   PLATELETCT 225 227   MPV 9.6 9.5   NEUTSPOLYS 78.00* 69.70   LYMPHOCYTES 15.70* 22.50   MONOCYTES 5.50 6.80   EOSINOPHILS 0.00 0.10   BASOPHILS 0.40 0.50       Imaging:   ECHO:  Normal left ventricular systolic function.   No evidence of valvular abnormality based on Doppler evaluation.   Severely dilated right ventricle. Normal right ventricular systolic   function.  Unable to estimate pulmonary artery pressure due to an inadequate   tricuspid regurgitant jet.    Shelli Moreira is a 44yo M with hx of HF (55%), HTN, sleep  apnea (CPAP), obesity; transferred from [facility] on 6/18 for heart failure exacerbation, anasarca, needing additional imaging for PE rule out; undergoing diuresis for heart failure, anasarca.     * Right heart failure with preserved right ventricular function (HCC)- (present on admission)  Assessment & Plan  Presents with progressive heart failure symptoms of shortness of breath, orthopnea, anasarca, without significant chest pain. Underwent initial diuresis at [facility] prior to transfer for workup of pulmonary HTN (requested V/Q vs. CTPE). Most likely etiology of pulmHTN leading to R-sided heart failure of 55% with RV/RA dilation is BRANDEN, obesity hypoventilation. Less likely PE. BNP elevated at 2200. Underwent 3L diuresis prior to transfer.  -Coreg 6.25mg Po BID  -Lasix 40mg IV BIDdiuretic  -Lisinopril 10mg PO qD as baseline kidney function is established  -CT PE (V/Q cancelled due to improved renal function)  -Sleep medicine referral (likely needs BiPAP)    Obstructive sleep apnea- (present on admission)  Assessment & Plan  Hx of sleep apnea, on CPAP with 99% adherence per recent report. Previously reports using BiPAP, and may need to be reevaluated for BiPAP.  -CPAP  -Sleep study referral  -ABG outpatient for obesity hypoventilation work up    Hypertension  Assessment & Plan  Hx of HTN, may be related to BRANDEN, obesity. On multiple antihypertensives at home, unsure of dosing at this time.  -Coreg 6.25mg PO BID  -Lasix 40mg IV BID diuretic  -Restart amlodipine 5mg PO qD (will obtain pharmacy records for confirmation)    SHARITA (acute kidney injury) (HCC)- (present on admission)  Assessment & Plan  Transferred from [facility] with reports of SHARITA, Cr 1.3, unsure baseline but appears to be improving post diuresis.  -Lasix 40mg IV BID diuretic  -BMP repeat AM    Morbid obesity (HCC)- (present on admission)  Assessment & Plan  BMI of 67 on transfer. Weight likely 30lb/15kg over due to volume  status.  -counseled on dietary/exercise

## 2021-06-19 NOTE — H&P
Hospital Medicine History & Physical Note    Date of Service  6/19/2021    Primary Care Physician  No primary care provider on file.    Consultants      Code Status  Full Code    Chief Complaint  Chief Complaint   Patient presents with   • Shortness of Breath       History of Presenting Illness  45 y.o. male who presented 6/18/2021 with past medical history of CHF, hypertension, sleep apnea on nocturnal CPAP transferred from [facility] ER for evaluation of worsening shortness of breath.  As per the patient he noticed progressively worsening shortness of breath for about past 6 months and acutely worsened on the past few days, following which he presented to [facility] where he was noted to have elevated BNP of 2560 and D-dimer of 2.25.  He also noticed that he gained about 30 pounds in past 2 weeks associated with worsening leg swelling.  Diagnostic chest x-ray showed cardiomegaly without any pulmonary edema or pneumonia.  His EKG showed sinus tachycardia with nonspecific T wave changes.  His echocardiogram showed normal left ventricular systolic function with severely dilated right ventricle.  Patient received Lasix at outlying facility producing about 3 L of urine, patient was further treated with nitrates for afterload reduction at [facility].  Patient admitted to telemetry for 4 echocardiogram IV diuresis and afterload reduction.  For his elevated D-dimer patient received therapeutic Lovenox, will get nuclear VQ scan in view of worsening creatinine.    Review of Systems  Review of Systems   Constitutional: Negative for chills and fever.   HENT: Negative for ear pain and sinus pain.    Eyes: Negative for blurred vision and pain.   Respiratory: Positive for shortness of breath. Negative for cough and hemoptysis.    Cardiovascular: Positive for chest pain and leg swelling. Negative for orthopnea.   Gastrointestinal: Positive for abdominal pain. Negative for diarrhea, nausea and vomiting.    Genitourinary: Negative for dysuria and hematuria.   Musculoskeletal: Negative for myalgias.   Skin: Negative for itching.   Neurological: Negative for tremors, focal weakness, weakness and headaches.   Psychiatric/Behavioral: Negative for suicidal ideas.       Past Medical History   has a past medical history of Asthma, Congestive heart failure (HCC), and Hypertension.    Surgical History   has no past surgical history on file.     Family History  family history is not on file.     Social History   reports that he has never smoked. He has never used smokeless tobacco. He reports current alcohol use. He reports current drug use. Drug: Inhaled.    Allergies  No Known Allergies    Medications  None       Physical Exam  Temp:  [37.8 °C (100 °F)] 37.8 °C (100 °F)  Pulse:  [107-117] 107  Resp:  [17-20] 17  BP: (144-164)/() 152/88  SpO2:  [93 %-98 %] 93 %    Physical Exam  Vitals reviewed.   Constitutional:       Appearance: Normal appearance. He is obese.   HENT:      Head: Normocephalic and atraumatic.      Right Ear: External ear normal.      Left Ear: External ear normal.      Mouth/Throat:      Mouth: Mucous membranes are moist.   Eyes:      Extraocular Movements: Extraocular movements intact.      Conjunctiva/sclera: Conjunctivae normal.      Pupils: Pupils are equal, round, and reactive to light.   Cardiovascular:      Rate and Rhythm: Regular rhythm. Tachycardia present.      Pulses: Normal pulses.      Heart sounds: Normal heart sounds.   Pulmonary:      Effort: Pulmonary effort is normal.      Breath sounds: Normal breath sounds.   Abdominal:      General: Abdomen is flat. Bowel sounds are normal. There is distension.      Palpations: Abdomen is soft.   Musculoskeletal:         General: Normal range of motion.      Right lower leg: Edema present.      Left lower leg: Edema present.   Skin:     General: Skin is warm.      Capillary Refill: Capillary refill takes less than 2 seconds.   Neurological:       General: No focal deficit present.      Mental Status: He is alert and oriented to person, place, and time.   Psychiatric:         Mood and Affect: Mood normal.         Laboratory:  Recent Labs     06/18/21 2128   WBC 11.2*   RBC 5.41   HEMOGLOBIN 14.7   HEMATOCRIT 46.1   MCV 85.2   MCH 27.2   MCHC 31.9*   RDW 47.5   PLATELETCT 225   MPV 9.6     Recent Labs     06/18/21 2128   SODIUM 141   POTASSIUM 3.9   CHLORIDE 96   CO2 34*   GLUCOSE 104*   BUN 16   CREATININE 1.35   CALCIUM 8.8     Recent Labs     06/18/21 2128   GLUCOSE 104*         No results for input(s): NTPROBNP in the last 72 hours.      Recent Labs     06/18/21 2128   TROPONINT 29*       Imaging:  EC-ECHOCARDIOGRAM COMPLETE W/ CONT   Final Result      OUTSIDE IMAGES-DX CHEST   Final Result      NM-LUNG VENT/PERF IMAGING    (Results Pending)         Assessment/Plan:  I anticipate this patient will require at least two midnights for appropriate medical management, necessitating inpatient admission.    Right heart failure with preserved right ventricular function (HCC)- (present on admission)  Assessment & Plan  Patient presented as a transfer from [facility] for worsening exertional shortness of breath, generalized edema, anasarca  Patient is already overloaded secondary to pulmonary artery hypertension likely secondary to obstructive sleep apnea  Significant for BNP of 2560, troponin 29  Cardiogram with severe right ventricular dilatation with normal right ventricular systolic function  Status post diuresis with IV Lasix at the outlying facility with 3 L urine output  Continue with IV Lasix 40 mg daily  Strict I&O, daily weights, fluid restriction 2500 cc, 2 g sodium  Cardiology consult in a.m.      SHARITA (acute kidney injury) (HCC)- (present on admission)  Assessment & Plan  Likely secondary to volume overload  Continue with IV diuretics  Avoid nephrotoxins, daily BMP    Morbid obesity (HCC)- (present on admission)  Assessment &  Plan  Counseled patient on lifestyle modification in outpatient setting    Obstructive sleep apnea- (present on admission)  Assessment & Plan  Continue with nocturnal CPAP    Elevated d-dimer- (present on admission)  Assessment & Plan  Patient with shortness of breath and elevated D-dimer  CTA not obtained due to SHARITA  Received therapeutic Lovenox  Follow-up VQ scan

## 2021-06-19 NOTE — ED TRIAGE NOTES
Chief Complaint   Patient presents with   • Shortness of Breath     Patient bib ambulance from [facility]. Patient arrived c/o sob with respiratory distress. Patient also noted to be hypertensive at previous facility. Patient given Lovenox, lasix, and hydralazine prior to arrival.

## 2021-06-19 NOTE — ASSESSMENT & PLAN NOTE
Hx of sleep apnea, on CPAP with 99% adherence per recent report. Previously reports using BiPAP, and may need to be reevaluated for BiPAP.  -CPAP  -Sleep study referral placed, likely needs BiPAP  -ABG outpatient for obesity hypoventilation work up

## 2021-06-19 NOTE — ASSESSMENT & PLAN NOTE
Presents with progressive heart failure symptoms of shortness of breath, orthopnea, anasarca, without significant chest pain. Underwent initial diuresis at [facility] prior to transfer for workup of pulmonary HTN (requested V/Q vs. CTPE). Most likely etiology of pulmHTN leading to R-sided heart failure of 55% with RV/RA dilation is BRANDEN, obesity hypoventilation. CTPE negative, no PE.  -Coreg 6.25mg Po BID  -STOPPED lasix 40mg IV (per HCO elevation)  -Lisinopril/HCTZ 20/25mg PO qD  -NO NEED for spironolactone, coreg on discharge except BP control  -Sleep medicine referral placed (likely needs BiPAP)  -PCP establishment, possible [facility] clinic

## 2021-06-20 ENCOUNTER — PATIENT OUTREACH (OUTPATIENT)
Dept: HEALTH INFORMATION MANAGEMENT | Facility: OTHER | Age: 46
End: 2021-06-20

## 2021-06-20 LAB
ALBUMIN SERPL BCP-MCNC: 2.9 G/DL (ref 3.2–4.9)
ALBUMIN/GLOB SERPL: 0.8 G/DL
ALP SERPL-CCNC: 63 U/L (ref 30–99)
ALT SERPL-CCNC: 24 U/L (ref 2–50)
ANION GAP SERPL CALC-SCNC: 6 MMOL/L (ref 7–16)
AST SERPL-CCNC: 22 U/L (ref 12–45)
BILIRUB SERPL-MCNC: 0.8 MG/DL (ref 0.1–1.5)
BUN SERPL-MCNC: 15 MG/DL (ref 8–22)
CALCIUM SERPL-MCNC: 9 MG/DL (ref 8.5–10.5)
CHLORIDE SERPL-SCNC: 95 MMOL/L (ref 96–112)
CO2 SERPL-SCNC: 37 MMOL/L (ref 20–33)
CREAT SERPL-MCNC: 1.23 MG/DL (ref 0.5–1.4)
GLOBULIN SER CALC-MCNC: 3.5 G/DL (ref 1.9–3.5)
GLUCOSE SERPL-MCNC: 98 MG/DL (ref 65–99)
MAGNESIUM SERPL-MCNC: 2 MG/DL (ref 1.5–2.5)
POTASSIUM SERPL-SCNC: 3.8 MMOL/L (ref 3.6–5.5)
PROT SERPL-MCNC: 6.4 G/DL (ref 6–8.2)
SODIUM SERPL-SCNC: 138 MMOL/L (ref 135–145)

## 2021-06-20 PROCEDURE — 83735 ASSAY OF MAGNESIUM: CPT

## 2021-06-20 PROCEDURE — 700102 HCHG RX REV CODE 250 W/ 637 OVERRIDE(OP): Performed by: STUDENT IN AN ORGANIZED HEALTH CARE EDUCATION/TRAINING PROGRAM

## 2021-06-20 PROCEDURE — A9270 NON-COVERED ITEM OR SERVICE: HCPCS | Performed by: STUDENT IN AN ORGANIZED HEALTH CARE EDUCATION/TRAINING PROGRAM

## 2021-06-20 PROCEDURE — 5A09357 ASSISTANCE WITH RESPIRATORY VENTILATION, LESS THAN 24 CONSECUTIVE HOURS, CONTINUOUS POSITIVE AIRWAY PRESSURE: ICD-10-PCS | Performed by: STUDENT IN AN ORGANIZED HEALTH CARE EDUCATION/TRAINING PROGRAM

## 2021-06-20 PROCEDURE — 36415 COLL VENOUS BLD VENIPUNCTURE: CPT

## 2021-06-20 PROCEDURE — 80053 COMPREHEN METABOLIC PANEL: CPT

## 2021-06-20 PROCEDURE — 94660 CPAP INITIATION&MGMT: CPT

## 2021-06-20 PROCEDURE — 770001 HCHG ROOM/CARE - MED/SURG/GYN PRIV*

## 2021-06-20 PROCEDURE — 99233 SBSQ HOSP IP/OBS HIGH 50: CPT | Mod: GC | Performed by: STUDENT IN AN ORGANIZED HEALTH CARE EDUCATION/TRAINING PROGRAM

## 2021-06-20 PROCEDURE — 700111 HCHG RX REV CODE 636 W/ 250 OVERRIDE (IP): Performed by: STUDENT IN AN ORGANIZED HEALTH CARE EDUCATION/TRAINING PROGRAM

## 2021-06-20 PROCEDURE — 94760 N-INVAS EAR/PLS OXIMETRY 1: CPT

## 2021-06-20 RX ORDER — HYDROCHLOROTHIAZIDE 25 MG/1
25 TABLET ORAL
Status: DISCONTINUED | OUTPATIENT
Start: 2021-06-20 | End: 2021-06-23 | Stop reason: HOSPADM

## 2021-06-20 RX ORDER — LISINOPRIL 20 MG/1
20 TABLET ORAL
Status: DISCONTINUED | OUTPATIENT
Start: 2021-06-20 | End: 2021-06-23 | Stop reason: HOSPADM

## 2021-06-20 RX ORDER — LISINOPRIL AND HYDROCHLOROTHIAZIDE 25; 20 MG/1; MG/1
1 TABLET ORAL DAILY
Status: DISCONTINUED | OUTPATIENT
Start: 2021-06-20 | End: 2021-06-20

## 2021-06-20 RX ADMIN — AMLODIPINE BESYLATE 5 MG: 5 TABLET ORAL at 04:55

## 2021-06-20 RX ADMIN — TIOTROPIUM BROMIDE INHALATION SPRAY 5 MCG: 3.12 SPRAY, METERED RESPIRATORY (INHALATION) at 08:48

## 2021-06-20 RX ADMIN — HYDROCHLOROTHIAZIDE 25 MG: 25 TABLET ORAL at 09:45

## 2021-06-20 RX ADMIN — ENOXAPARIN SODIUM 60 MG: 60 INJECTION SUBCUTANEOUS at 00:23

## 2021-06-20 RX ADMIN — CARVEDILOL 6.25 MG: 6.25 TABLET, FILM COATED ORAL at 16:45

## 2021-06-20 RX ADMIN — ENOXAPARIN SODIUM 60 MG: 60 INJECTION SUBCUTANEOUS at 12:14

## 2021-06-20 RX ADMIN — FUROSEMIDE 40 MG: 10 INJECTION, SOLUTION INTRAMUSCULAR; INTRAVENOUS at 04:55

## 2021-06-20 RX ADMIN — LISINOPRIL 20 MG: 20 TABLET ORAL at 09:45

## 2021-06-20 RX ADMIN — FUROSEMIDE 40 MG: 10 INJECTION, SOLUTION INTRAMUSCULAR; INTRAVENOUS at 16:45

## 2021-06-20 RX ADMIN — CARVEDILOL 6.25 MG: 6.25 TABLET, FILM COATED ORAL at 04:55

## 2021-06-20 ASSESSMENT — ENCOUNTER SYMPTOMS
PALPITATIONS: 0
CHILLS: 0
DIARRHEA: 0
ABDOMINAL PAIN: 0
VOMITING: 0
ORTHOPNEA: 0
SPUTUM PRODUCTION: 0
CONSTIPATION: 0
FEVER: 0
DOUBLE VISION: 0
HEADACHES: 0
COUGH: 0
NAUSEA: 0
BLURRED VISION: 0
SHORTNESS OF BREATH: 0
WEAKNESS: 0
FOCAL WEAKNESS: 0
DIZZINESS: 0

## 2021-06-20 ASSESSMENT — PATIENT HEALTH QUESTIONNAIRE - PHQ9
1. LITTLE INTEREST OR PLEASURE IN DOING THINGS: NOT AT ALL
2. FEELING DOWN, DEPRESSED, IRRITABLE, OR HOPELESS: NOT AT ALL
SUM OF ALL RESPONSES TO PHQ9 QUESTIONS 1 AND 2: 0

## 2021-06-20 ASSESSMENT — FIBROSIS 4 INDEX: FIB4 SCORE: 0.89

## 2021-06-20 NOTE — CARE PLAN
The patient is Watcher - Medium risk of patient condition declining or worsening    Shift Goals  Clinical Goals: Diurese  Patient Goals: Ease breathing/ decrease SOB    Progress made toward(s) clinical / shift goals:  Patient reports no SOB and no pain. Patient voiced desire to get up and walking today. Fall precautions in place and education provided.         Problem: Pain - Standard  Goal: Alleviation of pain or a reduction in pain to the patient’s comfort goal  Outcome: Progressing     Problem: Care Map:  Admission Optimal Outcome for the Heart Failure Patient  Goal: Admission:  Optimal Care of the heart failure patient  Outcome: Progressing     Problem: Care Map:  Day 3 Optimal Outcome for the Heart Failure Patient  Goal: Day 3:  Optimal Care of the heart failure patient  Outcome: Progressing     Problem: Skin Integrity  Goal: Skin integrity is maintained or improved  Outcome: Progressing     Problem: Fall Risk  Goal: Patient will remain free from falls  Outcome: Progressing

## 2021-06-20 NOTE — PROGRESS NOTES
4 Eyes Skin Assessment Completed by Samina RN and Mariluz RN.    Head WDL  Ears WDL grey foam placed  Nose WDL  Mouth WDL  Neck WDL  Breast/Chest WDL  Shoulder Blades WDL  Spine WDL  (R) Arm/Elbow/Hand WDL  (L) Arm/Elbow/Hand WDL  Abdomen WDL  Groin WDL  Scrotum/Coccyx/Buttocks WDL  (R) Leg Edema  (L) Leg Edema  (R) Heel/Foot/Toe Edema  (L) Heel/Foot/Toe Edema          Devices In Places Blood Pressure Cuff and Condom Cath      Interventions In Place Gray Ear Foams and InterDry    Possible Skin Injury No    Pictures Uploaded Into Epic N/A  Wound Consult Placed N/A  RN Wound Prevention Protocol Ordered Yes

## 2021-06-20 NOTE — FLOWSHEET NOTE
Respiratory Therapy Update    Interdisciplinary Plan of Care-Goals (Indications)  Bronchodilator Indications: History / Diagnosis (06/19/21 1400)                  Cough: Non Productive;Strong (06/20/21 0848)  Sputum Amount: Unable to Evaluate (06/20/21 0848)  Sputum Color: Unable to Evaluate (06/20/21 0848)  Sputum Consistency: Unable to Evaluate (06/20/21 0848)               FiO2%: 40 % (06/19/21 1450)  O2 (LPM): 4 (06/20/21 0848)       Breath Sounds  RUL Breath Sounds: Diminished (06/20/21 0848)  RML Breath Sounds: Diminished (06/20/21 0848)  RLL Breath Sounds: Diminished (06/20/21 0848)  DELFINA Breath Sounds: Diminished (06/20/21 0848)  LLL Breath Sounds: Diminished (06/20/21 0848)        Events/Summary/Plan: MDI done with good effort.  CPAP is off.  Pt did use last night. (06/20/21 0848)

## 2021-06-20 NOTE — FLOWSHEET NOTE
Telemetry Shift Summary     Rhythm: SR  HR Range: 80-90  Ectopy:   Measurements: 0.18/0.08/0.31              Normal Values  Rhythm SR  HR Range    Measurements 0.12-0.20 / 0.06-0.10  / 0.30-0.52

## 2021-06-20 NOTE — PROGRESS NOTES
Received report from NOC.  POC discussed. Call light and belongings within reach. Bed locked and in low position. Alarm on and fall precautions in place.

## 2021-06-20 NOTE — PROGRESS NOTES
Daily Progress Note:     Date of Service: 6/20/2021  Primary Team: [employer] White Team   Attending: Maylin Hamilton M.D.   Senior Resident: Dr. Braswell  Intern: Dr. Turpin  Contact:  [phone]    Chief Complaint:   Diastolic heart failure, volume overload    Subjective:   NAOE. Continues to diuresis with Lasix 40.  Today: reports feeling better, denies shortness of breath, chest pain. Asking about getting referral to sleep medicine for BiPAP.    Consultants/Specialty:    Review of Systems:    Review of Systems   Constitutional: Negative for chills and fever.   Eyes: Negative for blurred vision and double vision.   Respiratory: Negative for cough, sputum production and shortness of breath.    Cardiovascular: Positive for leg swelling. Negative for chest pain, palpitations and orthopnea.   Gastrointestinal: Negative for abdominal pain, constipation, diarrhea, nausea and vomiting.   Genitourinary: Negative for dysuria, frequency, hematuria and urgency.   Neurological: Negative for dizziness, focal weakness, weakness and headaches.       Objective Data:   Physical Exam:   Vitals:   Temp:  [36.3 °C (97.3 °F)-37.7 °C (99.8 °F)] 37.4 °C (99.3 °F)  Pulse:  [] 99  Resp:  [18] 18  BP: (130-171)/() 142/94  SpO2:  [97 %-99 %] 98 %     Physical Exam  Vitals and nursing note reviewed.   Constitutional:       General: He is not in acute distress.     Appearance: He is not ill-appearing or diaphoretic.   HENT:      Head: Normocephalic and atraumatic.      Mouth/Throat:      Mouth: Mucous membranes are moist.      Pharynx: Oropharynx is clear.   Eyes:      Extraocular Movements: Extraocular movements intact.      Pupils: Pupils are equal, round, and reactive to light.   Cardiovascular:      Rate and Rhythm: Normal rate and regular rhythm.      Heart sounds: No murmur heard.   No friction rub. No gallop.       Comments: 3+ edema extending up to thigh, and 2+ edema of flank  Pulmonary:      Effort: Pulmonary effort is  normal.      Breath sounds: Normal breath sounds. No wheezing, rhonchi or rales.   Abdominal:      General: There is no distension.      Palpations: Abdomen is soft.      Tenderness: There is no abdominal tenderness. There is no guarding or rebound.      Comments: 2+ flank edema   Skin:     General: Skin is warm and dry.   Neurological:      General: No focal deficit present.      Mental Status: He is alert and oriented to person, place, and time.      Cranial Nerves: No cranial nerve deficit.      Sensory: No sensory deficit.      Motor: No weakness.   Psychiatric:         Mood and Affect: Mood normal.         Behavior: Behavior normal.         Thought Content: Thought content normal.         Judgment: Judgment normal.           Labs:   Lab Results   Component Value Date/Time    SODIUM 138 06/20/2021 12:04 AM    POTASSIUM 3.8 06/20/2021 12:04 AM    CHLORIDE 95 (L) 06/20/2021 12:04 AM    CO2 37 (H) 06/20/2021 12:04 AM    GLUCOSE 98 06/20/2021 12:04 AM    BUN 15 06/20/2021 12:04 AM    CREATININE 1.23 06/20/2021 12:04 AM        Recent Labs     06/18/21  2128 06/19/21  0045   WBC 11.2* 11.9*   RBC 5.41 5.58   HEMOGLOBIN 14.7 14.8   HEMATOCRIT 46.1 47.6   MCV 85.2 85.3   MCH 27.2 26.5*   RDW 47.5 47.7   PLATELETCT 225 227   MPV 9.6 9.5   NEUTSPOLYS 78.00* 69.70   LYMPHOCYTES 15.70* 22.50   MONOCYTES 5.50 6.80   EOSINOPHILS 0.00 0.10   BASOPHILS 0.40 0.50       Imaging:   CT PE:   1.  No CT evidence of pulmonary embolism.  2.  Cardiomegaly.  3.  Probable hepatic steatosis.    Shelli Moreira is a 46yo M with hx of HF (55%), HTN, sleep apnea (CPAP), obesity; transferred from [facility] on 6/18 for heart failure exacerbation, anasarca, needing additional imaging for PE rule out; undergoing diuresis for heart failure, anasarca.     * Right heart failure with preserved right ventricular function (HCC)- (present on admission)  Assessment & Plan  Presents with progressive heart failure symptoms of shortness of breath,  orthopnea, anasarca, without significant chest pain. Underwent initial diuresis at [facility] prior to transfer for workup of pulmonary HTN (requested V/Q vs. CTPE). Most likely etiology of pulmHTN leading to R-sided heart failure of 55% with RV/RA dilation is BRANDEN, obesity hypoventilation. CTPE negative, no PE.  -Coreg 6.25mg Po BID  -Lasix 40mg IV BIDdiuretic  -Lisinopril/HCTZ 20/25mg PO qD  -NO NEED for spironolactone, coreg on discharge except BP control  -Sleep medicine referral (likely needs BiPAP)    Obstructive sleep apnea- (present on admission)  Assessment & Plan  Hx of sleep apnea, on CPAP with 99% adherence per recent report. Previously reports using BiPAP, and may need to be reevaluated for BiPAP.  -CPAP  -Sleep study referral, likely needs BiPAP  -ABG outpatient for obesity hypoventilation work up    Hypertension  Assessment & Plan  Hx of HTN, may be related to BRANDEN, obesity. On multiple antihypertensives at home, unsure of dosing at this time.  -Coreg 6.25mg PO BID  -Lasix 40mg IV BID diuretic  -Restart amlodipine 5mg PO qD  -restart lisinopril/HCTZ 20/25mg PO qD  -NO NEED for coreg, spironolactone on discharge    SHARITA (acute kidney injury) (HCC)- (present on admission)  Assessment & Plan  Transferred from [facility] with reports of SHARITA, Cr 1.3, baseline Cr appears to be 1.2.  -Lasix 40mg IV BID diuretic  -BMP repeat AM    Morbid obesity (HCC)- (present on admission)  Assessment & Plan  BMI of 67 on transfer. Weight likely 30lb/15kg over due to volume status.  -counseled on dietary/exercise

## 2021-06-21 PROBLEM — E11.9 TYPE 2 DIABETES MELLITUS (HCC): Status: RESOLVED | Noted: 2021-06-21 | Resolved: 2021-06-21

## 2021-06-21 PROBLEM — J96.90 RESPIRATORY FAILURE (HCC): Status: ACTIVE | Noted: 2021-06-21

## 2021-06-21 PROBLEM — E11.9 TYPE 2 DIABETES MELLITUS (HCC): Status: ACTIVE | Noted: 2021-06-21

## 2021-06-21 LAB
ANION GAP SERPL CALC-SCNC: 7 MMOL/L (ref 7–16)
BUN SERPL-MCNC: 18 MG/DL (ref 8–22)
CALCIUM SERPL-MCNC: 9 MG/DL (ref 8.5–10.5)
CHLORIDE SERPL-SCNC: 93 MMOL/L (ref 96–112)
CO2 SERPL-SCNC: 38 MMOL/L (ref 20–33)
CREAT SERPL-MCNC: 1.11 MG/DL (ref 0.5–1.4)
GLUCOSE SERPL-MCNC: 107 MG/DL (ref 65–99)
MAGNESIUM SERPL-MCNC: 2.1 MG/DL (ref 1.5–2.5)
POTASSIUM SERPL-SCNC: 3.7 MMOL/L (ref 3.6–5.5)
SODIUM SERPL-SCNC: 138 MMOL/L (ref 135–145)

## 2021-06-21 PROCEDURE — 700111 HCHG RX REV CODE 636 W/ 250 OVERRIDE (IP): Performed by: STUDENT IN AN ORGANIZED HEALTH CARE EDUCATION/TRAINING PROGRAM

## 2021-06-21 PROCEDURE — 99233 SBSQ HOSP IP/OBS HIGH 50: CPT | Mod: GC | Performed by: HOSPITALIST

## 2021-06-21 PROCEDURE — 700102 HCHG RX REV CODE 250 W/ 637 OVERRIDE(OP): Performed by: STUDENT IN AN ORGANIZED HEALTH CARE EDUCATION/TRAINING PROGRAM

## 2021-06-21 PROCEDURE — A9270 NON-COVERED ITEM OR SERVICE: HCPCS | Performed by: STUDENT IN AN ORGANIZED HEALTH CARE EDUCATION/TRAINING PROGRAM

## 2021-06-21 PROCEDURE — 80048 BASIC METABOLIC PNL TOTAL CA: CPT

## 2021-06-21 PROCEDURE — 5A09357 ASSISTANCE WITH RESPIRATORY VENTILATION, LESS THAN 24 CONSECUTIVE HOURS, CONTINUOUS POSITIVE AIRWAY PRESSURE: ICD-10-PCS | Performed by: STUDENT IN AN ORGANIZED HEALTH CARE EDUCATION/TRAINING PROGRAM

## 2021-06-21 PROCEDURE — 770001 HCHG ROOM/CARE - MED/SURG/GYN PRIV*

## 2021-06-21 PROCEDURE — 83735 ASSAY OF MAGNESIUM: CPT

## 2021-06-21 PROCEDURE — 94660 CPAP INITIATION&MGMT: CPT

## 2021-06-21 PROCEDURE — 36415 COLL VENOUS BLD VENIPUNCTURE: CPT

## 2021-06-21 PROCEDURE — 94760 N-INVAS EAR/PLS OXIMETRY 1: CPT

## 2021-06-21 RX ORDER — FUROSEMIDE 40 MG/1
40 TABLET ORAL
Status: DISCONTINUED | OUTPATIENT
Start: 2021-06-21 | End: 2021-06-23 | Stop reason: HOSPADM

## 2021-06-21 RX ADMIN — HYDROCHLOROTHIAZIDE 25 MG: 25 TABLET ORAL at 05:05

## 2021-06-21 RX ADMIN — ENOXAPARIN SODIUM 60 MG: 60 INJECTION SUBCUTANEOUS at 01:44

## 2021-06-21 RX ADMIN — ENOXAPARIN SODIUM 60 MG: 60 INJECTION SUBCUTANEOUS at 11:00

## 2021-06-21 RX ADMIN — ACETAMINOPHEN 650 MG: 325 TABLET, FILM COATED ORAL at 05:08

## 2021-06-21 RX ADMIN — CARVEDILOL 6.25 MG: 6.25 TABLET, FILM COATED ORAL at 09:04

## 2021-06-21 RX ADMIN — DOCUSATE SODIUM 50 MG AND SENNOSIDES 8.6 MG 2 TABLET: 8.6; 5 TABLET, FILM COATED ORAL at 05:05

## 2021-06-21 RX ADMIN — FUROSEMIDE 40 MG: 10 INJECTION, SOLUTION INTRAMUSCULAR; INTRAVENOUS at 05:05

## 2021-06-21 RX ADMIN — AMLODIPINE BESYLATE 5 MG: 5 TABLET ORAL at 05:05

## 2021-06-21 RX ADMIN — LISINOPRIL 20 MG: 20 TABLET ORAL at 05:05

## 2021-06-21 RX ADMIN — FUROSEMIDE 40 MG: 40 TABLET ORAL at 10:59

## 2021-06-21 RX ADMIN — CARVEDILOL 6.25 MG: 6.25 TABLET, FILM COATED ORAL at 16:51

## 2021-06-21 RX ADMIN — TIOTROPIUM BROMIDE INHALATION SPRAY 5 MCG: 3.12 SPRAY, METERED RESPIRATORY (INHALATION) at 09:05

## 2021-06-21 ASSESSMENT — ENCOUNTER SYMPTOMS
DIZZINESS: 0
WEAKNESS: 0
BLOOD IN STOOL: 0
CONSTIPATION: 0
HEADACHES: 0
ABDOMINAL PAIN: 0
PALPITATIONS: 0
CHILLS: 0
FEVER: 0
COUGH: 0
SPUTUM PRODUCTION: 0
VOMITING: 0
ORTHOPNEA: 1
NAUSEA: 0
DIARRHEA: 0
SHORTNESS OF BREATH: 1
FOCAL WEAKNESS: 0

## 2021-06-21 NOTE — DIETARY
NUTRITION SERVICES: BMI - Pt with BMI >40 (=Body mass index is 66.79 kg/m².), Class III obesity. Weight loss counseling not appropriate in acute care setting. RECOMMEND - If appropriate at DC please refer to outpatient nutrition services for weight management.     RD available PRN.

## 2021-06-21 NOTE — ASSESSMENT & PLAN NOTE
Acute hypoxic respiratory failure, not on home oxygen except CPAP at night. Currently on 2-3L supplemental. Related to volume status due to diastolic failure.  -Requiring 4 liter per Home O2 walk test

## 2021-06-21 NOTE — PROGRESS NOTES
CCT walked in to check on patient and found patient on the commode without his oxygen on and called this RN. Patient fell back in bed with his eyes rolling back. Patient states group of doctors got him up to weigh him and left him on the commode after. Educated patient on fall risk precautions and to please call if he gets up. Patients oxygen was 82% on room air. Patient put back on 4 liters, oxygen saturation at 91%, blood pressure 120/77. Patient back to baseline mentation.

## 2021-06-21 NOTE — PROGRESS NOTES
Bedside report received from Adrianna JOHNSON RN. Pt A&Ox4. POC discussed with pt. Pt verbalizes understanding. Call light and belongings within reach. Bed locked and in lowest position. Alarm and fall precautions in place.

## 2021-06-21 NOTE — PROGRESS NOTES
Daily Progress Note:     Date of Service: 6/21/2021  Primary Team: [employer] White Team   Attending: Della Monk MD   Senior Resident: Dr. Anderson  Intern: Dr. Turpin  Contact:  [phone]    Chief Complaint:   Pulmonary hypertension, diastolic heart failure    Subjective:   NAOE. Rate of diuresis somewhat slowed from previous days.  Today: Reports shortness of breath improved from previous day. Feeling significantly improved. Denies CP/palpitations, abd pain.    Consultants/Specialty:    Review of Systems:    Review of Systems   Constitutional: Negative for chills and fever.   Respiratory: Positive for shortness of breath. Negative for cough and sputum production.    Cardiovascular: Positive for orthopnea and leg swelling. Negative for chest pain and palpitations.   Gastrointestinal: Negative for abdominal pain, blood in stool, constipation, diarrhea, nausea and vomiting.   Genitourinary: Negative for dysuria, frequency, hematuria and urgency.   Neurological: Negative for dizziness, focal weakness, weakness and headaches.       Objective Data:   Physical Exam:   Vitals:   Temp:  [36.2 °C (97.2 °F)-37.1 °C (98.8 °F)] 37.1 °C (98.8 °F)  Pulse:  [] 90  Resp:  [18] 18  BP: (124-152)/() 152/98  SpO2:  [93 %-99 %] 94 %     Physical Exam  Vitals and nursing note reviewed.   Constitutional:       General: He is not in acute distress.     Appearance: He is not ill-appearing or diaphoretic.   HENT:      Head: Normocephalic and atraumatic.      Mouth/Throat:      Mouth: Mucous membranes are moist.      Pharynx: Oropharynx is clear.   Eyes:      Extraocular Movements: Extraocular movements intact.      Pupils: Pupils are equal, round, and reactive to light.   Cardiovascular:      Rate and Rhythm: Normal rate and regular rhythm.      Heart sounds: No murmur heard.   No friction rub. No gallop.    Pulmonary:      Effort: Pulmonary effort is normal.      Breath sounds: Normal breath sounds. No wheezing, rhonchi  or rales.   Abdominal:      Palpations: Abdomen is soft.      Tenderness: There is no abdominal tenderness. There is no guarding or rebound.   Skin:     General: Skin is warm and dry.   Neurological:      Mental Status: He is alert and oriented to person, place, and time.      Cranial Nerves: No cranial nerve deficit.      Sensory: No sensory deficit.   Psychiatric:         Mood and Affect: Mood normal.         Behavior: Behavior normal.         Thought Content: Thought content normal.         Judgment: Judgment normal.           Labs:   Lab Results   Component Value Date/Time    SODIUM 138 06/21/2021 12:13 AM    POTASSIUM 3.7 06/21/2021 12:13 AM    CHLORIDE 93 (L) 06/21/2021 12:13 AM    CO2 38 (H) 06/21/2021 12:13 AM    GLUCOSE 107 (H) 06/21/2021 12:13 AM    BUN 18 06/21/2021 12:13 AM    CREATININE 1.11 06/21/2021 12:13 AM        Recent Labs     06/18/21  2128 06/19/21  0045   WBC 11.2* 11.9*   RBC 5.41 5.58   HEMOGLOBIN 14.7 14.8   HEMATOCRIT 46.1 47.6   MCV 85.2 85.3   MCH 27.2 26.5*   RDW 47.5 47.7   PLATELETCT 225 227   MPV 9.6 9.5   NEUTSPOLYS 78.00* 69.70   LYMPHOCYTES 15.70* 22.50   MONOCYTES 5.50 6.80   EOSINOPHILS 0.00 0.10   BASOPHILS 0.40 0.50       Imaging:     Shelli Moreira is a 44yo M with hx of HF (55%), HTN, sleep apnea (CPAP), obesity; transferred from [facility] on 6/18 for subacute heart failure exacerbation, anasarca, needing additional imaging for PE rule out; undergoing diuresis for heart failure, anasarca.     * Right heart failure with preserved right ventricular function (HCC)- (present on admission)  Assessment & Plan  Presents with progressive heart failure symptoms of shortness of breath, orthopnea, anasarca, without significant chest pain. Underwent initial diuresis at [facility] prior to transfer for workup of pulmonary HTN (requested V/Q vs. CTPE). Most likely etiology of pulmHTN leading to R-sided heart failure of 55% with RV/RA dilation is BRANDEN, obesity hypoventilation.  CTPE negative, no PE.  -Coreg 6.25mg Po BID  -STOPPED lasix 40mg IV (per HCO elevation)  -Lisinopril/HCTZ 20/25mg PO qD  -NO NEED for spironolactone, coreg on discharge except BP control  -Sleep medicine referral placed (likely needs BiPAP)  -PCP establishment, possible [facility] clinic    Obstructive sleep apnea- (present on admission)  Assessment & Plan  Hx of sleep apnea, on CPAP with 99% adherence per recent report. Previously reports using BiPAP, and may need to be reevaluated for BiPAP.  -CPAP  -Sleep study referral placed, likely needs BiPAP  -ABG outpatient for obesity hypoventilation work up    Respiratory failure (HCC)  Assessment & Plan  Acute hypoxic respiratory failure, not on home oxygen except CPAP at night. Currently on 2-3L supplemental. Related to volume status due to diastolic failure.  -Home O2 walk test    Hypertension  Assessment & Plan  Hx of HTN, may be related to BRANDEN, obesity. On multiple antihypertensives at home, unsure of dosing at this time.  -Coreg 6.25mg PO BID  -STOPPED Lasix 40mg IV BID diuretic  -Restart amlodipine 5mg PO qD  -restart lisinopril/HCTZ 20/25mg PO qD  -NO NEED for coreg, spironolactone on discharge    SHARITA (acute kidney injury) (HCC)- (present on admission)  Assessment & Plan  Transferred from [facility] with reports of SHARITA, Cr 1.3, baseline Cr appears to be 1.2.  -STOPPED 40mg IV BID diuretic  -BMP repeat AM    Morbid obesity (HCC)- (present on admission)  Assessment & Plan  BMI of 67 on transfer. Weight likely 30lb/15kg over due to volume status.  -counseled on dietary/exercise  -PT/OT for disposition planning

## 2021-06-22 PROCEDURE — 700102 HCHG RX REV CODE 250 W/ 637 OVERRIDE(OP): Performed by: STUDENT IN AN ORGANIZED HEALTH CARE EDUCATION/TRAINING PROGRAM

## 2021-06-22 PROCEDURE — 700111 HCHG RX REV CODE 636 W/ 250 OVERRIDE (IP): Performed by: STUDENT IN AN ORGANIZED HEALTH CARE EDUCATION/TRAINING PROGRAM

## 2021-06-22 PROCEDURE — 770001 HCHG ROOM/CARE - MED/SURG/GYN PRIV*

## 2021-06-22 PROCEDURE — 97162 PT EVAL MOD COMPLEX 30 MIN: CPT

## 2021-06-22 PROCEDURE — 700101 HCHG RX REV CODE 250: Performed by: STUDENT IN AN ORGANIZED HEALTH CARE EDUCATION/TRAINING PROGRAM

## 2021-06-22 PROCEDURE — A9270 NON-COVERED ITEM OR SERVICE: HCPCS | Performed by: STUDENT IN AN ORGANIZED HEALTH CARE EDUCATION/TRAINING PROGRAM

## 2021-06-22 PROCEDURE — 97166 OT EVAL MOD COMPLEX 45 MIN: CPT

## 2021-06-22 PROCEDURE — 94760 N-INVAS EAR/PLS OXIMETRY 1: CPT

## 2021-06-22 PROCEDURE — 99232 SBSQ HOSP IP/OBS MODERATE 35: CPT | Mod: GC | Performed by: HOSPITALIST

## 2021-06-22 PROCEDURE — 5A09357 ASSISTANCE WITH RESPIRATORY VENTILATION, LESS THAN 24 CONSECUTIVE HOURS, CONTINUOUS POSITIVE AIRWAY PRESSURE: ICD-10-PCS | Performed by: STUDENT IN AN ORGANIZED HEALTH CARE EDUCATION/TRAINING PROGRAM

## 2021-06-22 PROCEDURE — 94660 CPAP INITIATION&MGMT: CPT

## 2021-06-22 RX ORDER — CARVEDILOL 6.25 MG/1
6.25 TABLET ORAL 2 TIMES DAILY WITH MEALS
Qty: 60 TABLET | Refills: 0 | OUTPATIENT
Start: 2021-06-22

## 2021-06-22 RX ORDER — AMLODIPINE BESYLATE 10 MG/1
10 TABLET ORAL
Status: DISCONTINUED | OUTPATIENT
Start: 2021-06-23 | End: 2021-06-23 | Stop reason: HOSPADM

## 2021-06-22 RX ORDER — FUROSEMIDE 20 MG/1
40 TABLET ORAL DAILY
Qty: 60 TABLET | Refills: 0 | OUTPATIENT
Start: 2021-06-22

## 2021-06-22 RX ADMIN — ENOXAPARIN SODIUM 60 MG: 60 INJECTION SUBCUTANEOUS at 12:14

## 2021-06-22 RX ADMIN — HYDROCHLOROTHIAZIDE 25 MG: 25 TABLET ORAL at 05:50

## 2021-06-22 RX ADMIN — LABETALOL HYDROCHLORIDE 10 MG: 5 INJECTION, SOLUTION INTRAVENOUS at 05:49

## 2021-06-22 RX ADMIN — CARVEDILOL 6.25 MG: 6.25 TABLET, FILM COATED ORAL at 08:32

## 2021-06-22 RX ADMIN — ENOXAPARIN SODIUM 60 MG: 60 INJECTION SUBCUTANEOUS at 01:01

## 2021-06-22 RX ADMIN — CARVEDILOL 6.25 MG: 6.25 TABLET, FILM COATED ORAL at 16:46

## 2021-06-22 RX ADMIN — FUROSEMIDE 40 MG: 40 TABLET ORAL at 05:49

## 2021-06-22 RX ADMIN — LISINOPRIL 20 MG: 20 TABLET ORAL at 05:50

## 2021-06-22 RX ADMIN — TIOTROPIUM BROMIDE INHALATION SPRAY 5 MCG: 3.12 SPRAY, METERED RESPIRATORY (INHALATION) at 09:03

## 2021-06-22 RX ADMIN — AMLODIPINE BESYLATE 5 MG: 5 TABLET ORAL at 05:49

## 2021-06-22 ASSESSMENT — COGNITIVE AND FUNCTIONAL STATUS - GENERAL
DRESSING REGULAR LOWER BODY CLOTHING: A LOT
CLIMB 3 TO 5 STEPS WITH RAILING: A LITTLE
DRESSING REGULAR UPPER BODY CLOTHING: A LITTLE
MOVING FROM LYING ON BACK TO SITTING ON SIDE OF FLAT BED: A LITTLE
MOVING TO AND FROM BED TO CHAIR: A LITTLE
MOBILITY SCORE: 19
DAILY ACTIVITIY SCORE: 19
STANDING UP FROM CHAIR USING ARMS: A LITTLE
HELP NEEDED FOR BATHING: A LITTLE
WALKING IN HOSPITAL ROOM: A LITTLE
SUGGESTED CMS G CODE MODIFIER MOBILITY: CK
TOILETING: A LITTLE
SUGGESTED CMS G CODE MODIFIER DAILY ACTIVITY: CK

## 2021-06-22 ASSESSMENT — ENCOUNTER SYMPTOMS
ORTHOPNEA: 1
FOCAL WEAKNESS: 0
WEAKNESS: 0
SPUTUM PRODUCTION: 0
CONSTIPATION: 0
COUGH: 0
HEADACHES: 0
FEVER: 0
PALPITATIONS: 0
SHORTNESS OF BREATH: 1
DIZZINESS: 0
ABDOMINAL PAIN: 0
NAUSEA: 0
VOMITING: 0
BLOOD IN STOOL: 0
DIARRHEA: 0
CHILLS: 0

## 2021-06-22 ASSESSMENT — ACTIVITIES OF DAILY LIVING (ADL): TOILETING: INDEPENDENT

## 2021-06-22 ASSESSMENT — GAIT ASSESSMENTS
GAIT LEVEL OF ASSIST: SUPERVISED
DISTANCE (FEET): 200
DEVIATION: BRADYKINETIC;SHUFFLED GAIT
ASSISTIVE DEVICE: FRONT WHEEL WALKER
DISTANCE (FEET): 20

## 2021-06-22 NOTE — DISCHARGE PLANNING
Agency/Facility Name: [facility]  Outcome: Manually faxed referral since failed in right fax.    LSW informed

## 2021-06-22 NOTE — CARE PLAN
Problem: Skin Integrity  Goal: Skin integrity is maintained or improved  Outcome: Progressing  Note: Skin interventions in place. Bariatric low air loss bed, mepilex's, and skin care completed.      Problem: Fall Risk  Goal: Patient will remain free from falls  Outcome: Progressing  Note: Yellow wrist band and socks on, bed/strip alarm on, appropriate signs at the door, education completed.      The patient is Watcher - Medium risk of patient condition declining or worsening    Shift Goals: Ambulated  Clinical Goals: Stict intake and output  Patient Goals: Rest    Progress made toward(s) clinical / shift goals:  Strict intake and output    Patient is not progressing towards the following goals:Patient felt dizzy for home oxygen evaluation.

## 2021-06-22 NOTE — HEART FAILURE PROGRAM
Documentation Clarification:    Please note, this patient was identified as having a Heart Failure (HF) diagnosis by our HF Report to Web tool which is based upon problems lists and/or provider notes.     This patient has one or more of the below exclusion criteria for Heart Failure with Preserved Ejection Fraction (HFpEF) and therefore does not qualify for this diagnosis at this time, we ask that these confounding conditions be controlled before HFpEF be diagnosed.    Please remove HF from the problem list as well as the progress notes going forward.    For questions, please contact Tiffanie Castrejon RN, [facility] or Mao Maier MD through [employer]. Thanks for your cooperation.    • COPD: O2 Dependent or on Chronic Steroids    • Restrictive lung disease  • BMI > 40  • Acute PE  • PAH  • Active PNA  • GFR < 30  • Uncontrolled HTN  • Anemia with Hg <10  • Hemodynamically significant valve disease: Moderate or greater  • Uncontrolled AF  • Cirrhosis  • Right sided heart failure

## 2021-06-22 NOTE — FACE TO FACE
Face to Face Supporting Documentation - Home Health    The encounter with this patient was in whole or in part the primary reason for home health admission.    Date of encounter:   Patient:                    MRN:                       YOB: 2021  Shelli YOUNG Harish  [id number]  1975     Home health to see patient for:  Physical Therapy evaluation and treatment and Occupational therapy evaluation and treatment    Skilled need for:  Exacerbation of Chronic Disease State Heart failure     Skilled nursing interventions to include:  Comment: none    Homebound status evidenced by:  Need the aid of supportive devices such as crutches, canes, wheelchairs or walkers. Leaving home requires a considerable and taxing effort. There is a normal inability to leave the home.    Community Physician to provide follow up care: No primary care provider on file.     Optional Interventions? No      I certify the face to face encounter for this home health care referral meets the CMS requirements and the encounter/clinical assessment with the patient was, in whole, or in part, for the medical condition(s) listed above, which is the primary reason for home health care. Based on my clinical findings: the service(s) are medically necessary, support the need for home health care, and the homebound criteria are met.  I certify that this patient has had a face to face encounter by myself.  Della Monk M.D. - NPI: [id number]

## 2021-06-22 NOTE — PROGRESS NOTES
4 Eyes Skin Assessment Completed by DANA Pimentel and DANA Machuca.    Head WDL  Ears Redness and Blanching  Nose Redness and Blanching  Mouth WDL  Neck WDL  Breast/Chest WDL  Shoulder Blades WDL  Spine WDL  (R) Arm/Elbow/Hand Redness and Blanching  (L) Arm/Elbow/Hand Redness and Blanching  Abdomen WDL  Groin Redness, Blanching and Excoriation  Scrotum/Coccyx/Buttocks Redness, Blanching and Excoriation  (R) Leg Redness, Blanching, Bruising, Swelling and Edema  (L) Leg Redness, Blanching, Bruising, Swelling and Edema  (R) Heel/Foot/Toe Redness, Blanching and Boggy  (L) Heel/Foot/Toe Redness, Blanching and Boggy   Generalized dry, flaky skin          Devices In Places Nasal Cannula      Interventions In Place Gray Ear Foams, NC W/Ear Foams, Heel Mepilex, Sacral Mepilex, Pillows, Q2 Turns, Low Air Loss Mattress, Barrier Cream and Heels Loaded W/Pillows    Possible Skin Injury No    Pictures Uploaded Into Epic N/A  Wound Consult Placed N/A  RN Wound Prevention Protocol Ordered Yes

## 2021-06-22 NOTE — CARE PLAN
Problem: Care Map:  Day Before Discharge Optimal Outcome for the Heart Failure Patient  Goal: Day Before Discharge:  Optimal Care of the heart failure patient  Outcome: Progressing     Problem: Fall Risk  Goal: Patient will remain free from falls  Outcome: Progressing  Note: Fall precautions in place. Strip alarm in chair.      The patient is Stable - Low risk of patient condition declining or worsening    Shift Goals: Strict intake and output  Clinical Goals: Ambulate  Patient Goals: Up to chair for meals    Progress made toward(s) clinical / shift goals:  Ambulating, sitting up in chair for meals.     Patient is not progressing towards the following goals: Uses the toilet to void, re-educated.

## 2021-06-22 NOTE — CARE PLAN
The patient is Watcher - Medium risk of patient condition declining or worsening    Problem: Pain - Standard  Goal: Alleviation of pain or a reduction in pain to the patient’s comfort goal  Outcome: Progressing     Problem: Knowledge Deficit - Standard  Goal: Patient and family/care givers will demonstrate understanding of plan of care, disease process/condition, diagnostic tests and medications  Outcome: Progressing     Problem: Skin Integrity  Goal: Skin integrity is maintained or improved  Outcome: Progressing     Problem: Fall Risk  Goal: Patient will remain free from falls  Outcome: Progressing

## 2021-06-22 NOTE — DISCHARGE PLANNING
Anticipated Discharge Disposition: Home with home health and DME    Action: LSW met with patient and spouse at bedside. Patient gave choice for HH and DME. LSW explained that only one HH agency services his home area. Patient will also be established with Dr. Turpin at [facility] as a PCP.    Barriers to Discharge: HH acceptance, DME acceptance and delivery     Plan: LSW to f/u on referral

## 2021-06-22 NOTE — PROGRESS NOTES
Daily Progress Note:     Date of Service: 6/22/2021  Primary Team: [employer] IM White Team   Attending: Della Monk MD   Senior Resident: Dr. Anderson  Intern: Dr. Turpin   Contact:  [phone]    Chief Complaint:   Pulmonary hypertension, diastolic heart failure    Interval history:   No over night events   Denies chest pain, palpitations, Sob mildly improved   Hemodynamically stable  Saturating in 90's on 4L through NS   IV Lasix switched to oral yesterday, net negative 1080  Requiring 4 L oxygen on home O2 evaluation   Evaluated by PT/OT who recommends Home with home health.   Face to face placed for Home health and oxygen. Likely Dc today after oxygen is set up.       Consultants/Specialty:    Review of Systems:    Review of Systems   Constitutional: Negative for chills and fever.   Respiratory: Positive for shortness of breath. Negative for cough and sputum production.    Cardiovascular: Positive for orthopnea and leg swelling. Negative for chest pain and palpitations.   Gastrointestinal: Negative for abdominal pain, blood in stool, constipation, diarrhea, nausea and vomiting.   Genitourinary: Negative for dysuria, frequency, hematuria and urgency.   Neurological: Negative for dizziness, focal weakness, weakness and headaches.       Objective Data:   Physical Exam:   Vitals:   Temp:  [36.1 °C (96.9 °F)-36.7 °C (98.1 °F)] 36.4 °C (97.5 °F)  Pulse:  [80-90] 83  Resp:  [18-23] 18  BP: (120-171)/() 122/83  SpO2:  [91 %-100 %] 97 %     Physical Exam  Vitals and nursing note reviewed.   Constitutional:       General: He is not in acute distress.     Appearance: He is not ill-appearing or diaphoretic.   HENT:      Head: Normocephalic and atraumatic.      Mouth/Throat:      Mouth: Mucous membranes are moist.      Pharynx: Oropharynx is clear.   Eyes:      Extraocular Movements: Extraocular movements intact.      Pupils: Pupils are equal, round, and reactive to light.   Cardiovascular:      Rate and Rhythm:  Normal rate and regular rhythm.      Heart sounds: No murmur heard.   No friction rub. No gallop.    Pulmonary:      Effort: Pulmonary effort is normal.      Breath sounds: Normal breath sounds. No wheezing, rhonchi or rales.   Abdominal:      Palpations: Abdomen is soft.      Tenderness: There is no abdominal tenderness. There is no guarding or rebound.   Skin:     General: Skin is warm and dry.   Neurological:      Mental Status: He is alert and oriented to person, place, and time.      Cranial Nerves: No cranial nerve deficit.      Sensory: No sensory deficit.   Psychiatric:         Mood and Affect: Mood normal.         Behavior: Behavior normal.         Thought Content: Thought content normal.         Judgment: Judgment normal.           Labs:   Lab Results   Component Value Date/Time    SODIUM 138 06/21/2021 12:13 AM    POTASSIUM 3.7 06/21/2021 12:13 AM    CHLORIDE 93 (L) 06/21/2021 12:13 AM    CO2 38 (H) 06/21/2021 12:13 AM    GLUCOSE 107 (H) 06/21/2021 12:13 AM    BUN 18 06/21/2021 12:13 AM    CREATININE 1.11 06/21/2021 12:13 AM        No results for input(s): WBC, RBC, HEMOGLOBIN, HEMATOCRIT, MCV, MCH, RDW, PLATELETCT, MPV, NEUTSPOLYS, LYMPHOCYTES, MONOCYTES, EOSINOPHILS, BASOPHILS, RBCMORPHOLO in the last 72 hours.    Imaging:     Shelli Moreira is a 46yo M with hx of HF (55%), HTN, sleep apnea (CPAP), obesity; transferred from [facility] on 6/18 for subacute heart failure exacerbation, anasarca, needing additional imaging for PE rule out; undergoing diuresis for heart failure, anasarca.     * Right heart failure with preserved right ventricular function (HCC)- (present on admission)  Assessment & Plan  Presents with progressive heart failure symptoms of shortness of breath, orthopnea, anasarca, without significant chest pain. Underwent initial diuresis at [facility] prior to transfer for workup of pulmonary HTN (requested V/Q vs. CTPE). Most likely etiology of pulmHTN leading to R-sided heart  failure of 55% with RV/RA dilation is BRANDEN, obesity hypoventilation. CTPE negative, no PE.  -Coreg 6.25mg Po BID  -STOPPED lasix 40mg IV (per HCO elevation)  -Lisinopril/HCTZ 20/25mg PO qD  -NO NEED for spironolactone, coreg on discharge except BP control  -Sleep medicine referral placed (likely needs BiPAP)  -PCP establishment, possible [facility]    Respiratory failure (HCC)  Assessment & Plan  Acute hypoxic respiratory failure, not on home oxygen except CPAP at night. Currently on 2-3L supplemental. Related to volume status due to diastolic failure.  -Requiring 4 liter per Home O2 walk test    Hypertension  Assessment & Plan  Hx of HTN, may be related to BRANDEN, obesity. On multiple antihypertensives at home, unsure of dosing at this time.  -Coreg 6.25mg PO BID  -STOPPED Lasix 40mg IV BID diuretic  -Restart amlodipine 10mg PO qD  -restart lisinopril/HCTZ 20/25mg PO qD  -NO NEED for coreg, spironolactone on discharge    SHARITA (acute kidney injury) ([facility])- (present on admission)  Assessment & Plan  Transferred from [facility] with reports of SHARITA, Cr 1.3, baseline Cr appears to be 1.2.  -STOPPED 40mg IV BID diuretic  -BMP repeat AM    Morbid obesity (HCC)- (present on admission)  Assessment & Plan  BMI of 67 on transfer. Weight likely 30lb/15kg over due to volume status.  -counseled on dietary/exercise  -PT/OT recs : home health     Obstructive sleep apnea- (present on admission)  Assessment & Plan  Hx of sleep apnea, on CPAP with 99% adherence per recent report. Previously reports using BiPAP, and may need to be reevaluated for BiPAP.  -CPAP  -Sleep study referral placed, likely needs BiPAP  -ABG outpatient for obesity hypoventilation work up

## 2021-06-22 NOTE — DISCHARGE PLANNING
Agency/Facility Name: [facility]  Spoke To: Fax  Outcome: Declined does not take insurance    LSW informed

## 2021-06-22 NOTE — PROGRESS NOTES
Bedside report received from Morena JOHNSON RN. Pt A&Ox4. POC discussed with pt. Pt verbalizes understanding. Call light and belongings within reach. Bed locked and in lowest position. Alarm and fall precautions in place.

## 2021-06-22 NOTE — DISCHARGE PLANNING
Received Choice form at 1231   Agency/Facility Name: [facility]  Referral sent per Choice form @ 1239     LSW informed

## 2021-06-22 NOTE — FACE TO FACE
"Face to Face Note  -  Durable Medical Equipment    Jonah Turpin M.D. - NPI: [id number]  I certify that this patient is under my care and that they had a durable medical equipment(DME)face to face encounter by myself that meets the physician DME face-to-face encounter requirements with this patient on:    Date of encounter:   Patient:                    MRN:                       YOB: 2021  Shelli YOUNG Harish  [id number]  1975     The encounter with the patient was in whole, or in part, for the following medical condition, which is the primary reason for durable medical equipment:  CHF    I certify that, based on my findings, the following durable medical equipment is medically necessary:  Oxygen.    HOME O2 Saturation Measurements:(Values must be present for Home Oxygen orders)  Room air sat at rest: 86  Room air sat with amb: 78  With liters of O2: 4, O2 sat at rest with O2: 99  With Liters of O2: 4, O2 sat with amb with O2 : 88  Is the patient mobile?: Yes    My Clinical findings support the need for the above equipment due to:  Hypoxia    Supporting Symptoms: The patient requires supplemental oxygen, as the following interventions have been tried with limited or no improvement: \"Positive expiratory pressure therapies, \"Ambulation with oximetry and \"Incentive spirometry    If patient feels more short of breath, they can go up to 6 liters per minute and contact healthcare provider.  "

## 2021-06-22 NOTE — THERAPY
Physical Therapy   Initial Evaluation     Patient Name: Shelli Moreira  Age:  45 y.o., Sex:  male  Medical Record #: [id number]  Today's Date: 6/22/2021     Precautions: (P) Fall Risk, Cardiac Precautions (See Comments)    Assessment  Patient is 45 y.o. male transferred from [facility] on 6/18 for heart failure exacerbation and anasarca. PMHx includes HF (55%), HTN, sleep apnea (CPAP), and obesity. Patient lives in [facility] [facility] with wife and MIL who are available to assist PRN. At time of evaluation, patient able to demonstrate functional mobility with SPV. Patient with poor activity tolerance, impaired balance and difficulty with gait and will benefit from HHPT upon discharge.     Plan    Recommend Physical Therapy for Evaluation only     DC Equipment Recommendations: (P) Front-Wheel Walker (wide)  Discharge Recommendations: (P) Recommend home health for continued physical therapy services        Objective       06/22/21 0921   Prior Living Situation   Housing / Facility 1 Story House   Steps Into Home 0   Steps In Home 0   Bathroom Set up Walk In Shower;Shower Chair;Grab Bars   Equipment Owned None   Lives with - Patient's Self Care Capacity Spouse  (MIL)   Prior Level of Functional Mobility   Bed Mobility Independent   Transfer Status Independent   Ambulation Independent   Distance Ambulation (Feet)   (limited community distances)   Assistive Devices Used None   Gait Analysis   Gait Level Of Assist Supervised   Assistive Device Front Wheel Walker   Distance (Feet) 200   # of Times Distance was Traveled 1   Deviation Bradykinetic;Shuffled Gait   # of Stairs Climbed 0   Weight Bearing Status no restrictions   Bed Mobility    Supine to Sit Supervised   Sit to Supine Supervised   Scooting Supervised   Rolling Supervised   Functional Mobility   Sit to Stand Supervised   Bed, Chair, Wheelchair Transfer Supervised   Transfer Method Stand Step   Mobility w/ FWW   Anticipated Discharge Equipment and Recommendations   DC  Equipment Recommendations Front-Wheel Walker  (wide)   Discharge Recommendations Recommend home health for continued physical therapy services

## 2021-06-22 NOTE — THERAPY
Occupational Therapy   Initial Evaluation     Patient Name: Shelli Moreira  Age:  45 y.o., Sex:  male  Medical Record #: [id number]  Today's Date: 6/22/2021     Precautions  Precautions: (P) Fall Risk, Cardiac Precautions (See Comments)  Comments: (P) req 4L O2    Assessment  Patient is 45 y.o. male with a diagnosis of heart failure, pulmonary hypertension.  Additional factors influencing patient status / progress: morbid obesity. Optimal level of function noted, no further skilled OT intervention warranted at this time.      Plan    Recommend Occupational Therapy for Evaluation only for the following treatments:  NA.    DC Equipment Recommendations: (P)  (bariatric FWW)  Discharge Recommendations: (P) Anticipate that the patient will have no further occupational therapy needs after discharge from the hospital     Subjective    Pleasant and cooperative     Objective       06/22/21 0750   Total Time Spent   Total Time Spent (Mins)    Charge Group   OT Evaluation OT Evaluation Mod   Initial Contact Note    Initial Contact Note Order Received and Verified, Evaluation Only - Patient Does Not Require Further Acute Occupational Therapy at this Time.  However, May Benefit from Post Acute Therapy for Higher Level Functional Deficits.   Prior Living Situation   Prior Services None   Housing / Facility 1 Story House   Steps Into Home 1   Steps In Home 0   Bathroom Set up Walk In Shower;Bathtub / Shower Combination   Equipment Owned None   Lives with - Patient's Self Care Capacity Spouse;Child Less than 18 Years of Age  (4 year old daughter)   Prior Level of ADL Function   Self Feeding Independent   Grooming / Hygiene Independent   Bathing Independent   Dressing Independent   Toileting Independent   Comments takes a shower aftr BM to asssit with hygiene   Prior Level of IADL Function   Medication Management Independent   Laundry Requires Assist   Kitchen Mobility Independent   Finances Independent   Home Management Requires  Assist   Shopping Independent   Prior Level Of Mobility Independent Without Device in Community   Precautions   Precautions Fall Risk   Comments O2 sats drop quickly on RA   Vitals   O2 (LPM) 4   O2 Delivery Device Silicone Nasal Cannula   Pain 0 - 10 Group   Therapist Pain Assessment During Activity;Post Activity Pain Same as Prior to Activity;Nurse Notified;0   Cognition    Cognition / Consciousness WDL   Level of Consciousness Alert   Passive ROM Upper Body   Passive ROM Upper Body WDL   Active ROM Upper Body   Active ROM Upper Body  WDL   Dominant Hand Right   Strength Upper Body   Upper Body Strength  WDL   Sensation Upper Body   Upper Extremity Sensation  WDL   Upper Body Muscle Tone   Upper Body Muscle Tone  WDL   Coordination Upper Body   Coordination WDL   Balance Assessment   Sitting Balance (Static) Fair +   Sitting Balance (Dynamic) Fair +   Standing Balance (Static) Fair   Standing Balance (Dynamic) Fair   Weight Shift Sitting Fair   Weight Shift Standing Fair   Bed Mobility    Supine to Sit Supervised   Sit to Supine   (left seated in bedside chair)   Scooting Supervised   Rolling Supervised   ADL Assessment   Eating Independent   Grooming Supervision;Standing   Bathing   (NT)   Upper Body Dressing Supervision   Lower Body Dressing Supervision  (slippers)   Toileting Moderate Assist   Comments asssit eileen post BM hygiene, reports he gets into the shower after BM at home, to clean   How much help from another person does the patient currently need...   Putting on and taking off regular lower body clothing? 2   Bathing (including washing, rinsing, and drying)? 3   Toileting, which includes using a toilet, bedpan, or urinal? 3   Putting on and taking off regular upper body clothing? 3   Taking care of personal grooming such as brushing teeth? 4   Eating meals? 4   6 Clicks Daily Activity Score 19   Functional Mobility   Sit to Stand Minimal Assist   Bed, Chair, Wheelchair Transfer Minimal Assist   Toilet  Transfers Minimal Assist   Transfer Method Stand Pivot   Distance (Feet) 20   # of Times Distance was Traveled 2   Visual Perception   Visual Perception  WDL   Activity Tolerance   Sitting in Chair ad susana   Sitting Edge of Bed ad susana   Standing ad susana   Education Group   Education Provided Activities of Daily Living;Adaptive Equipment;Energy Conservation   Role of Occupational Therapist Patient Response Patient;Acceptance;Explanation;Demonstration;Verbal Demonstration;Action Demonstration   Energy Conservation Patient Response Patient;Acceptance;Explanation;Demonstration;Verbal Demonstration;Action Demonstration   ADL Patient Response Patient;Acceptance;Explanation;Demonstration;Verbal Demonstration;Action Demonstration   Adaptive Equipment Patient Response Patient;Acceptance;Explanation;Demonstration;Verbal Demonstration;Action Demonstration   Anticipated Discharge Equipment and Recommendations   DC Equipment Recommendations   (bariatric FWW)   Discharge Recommendations Anticipate that the patient will have no further occupational therapy needs after discharge from the hospital   Interdisciplinary Plan of Care Collaboration   IDT Collaboration with  Nursing;Certified Nursing Assistant   Patient Position at End of Therapy Seated;Chair Alarm On;Call Light within Reach;Tray Table within Reach;Phone within Reach   Collaboration Comments report given   Session Information   Date / Session Number  6/22,1/1   Priority 0

## 2021-06-23 VITALS
RESPIRATION RATE: 18 BRPM | DIASTOLIC BLOOD PRESSURE: 87 MMHG | HEART RATE: 92 BPM | HEIGHT: 64 IN | OXYGEN SATURATION: 98 % | SYSTOLIC BLOOD PRESSURE: 135 MMHG | WEIGHT: 315 LBS | BODY MASS INDEX: 53.78 KG/M2 | TEMPERATURE: 97 F

## 2021-06-23 PROCEDURE — 99238 HOSP IP/OBS DSCHRG MGMT 30/<: CPT | Mod: GC | Performed by: HOSPITALIST

## 2021-06-23 PROCEDURE — A9270 NON-COVERED ITEM OR SERVICE: HCPCS | Performed by: STUDENT IN AN ORGANIZED HEALTH CARE EDUCATION/TRAINING PROGRAM

## 2021-06-23 PROCEDURE — 700111 HCHG RX REV CODE 636 W/ 250 OVERRIDE (IP): Performed by: STUDENT IN AN ORGANIZED HEALTH CARE EDUCATION/TRAINING PROGRAM

## 2021-06-23 PROCEDURE — 700102 HCHG RX REV CODE 250 W/ 637 OVERRIDE(OP): Performed by: STUDENT IN AN ORGANIZED HEALTH CARE EDUCATION/TRAINING PROGRAM

## 2021-06-23 PROCEDURE — 94660 CPAP INITIATION&MGMT: CPT

## 2021-06-23 RX ORDER — CARVEDILOL 6.25 MG/1
6.25 TABLET ORAL 2 TIMES DAILY WITH MEALS
Qty: 60 TABLET | Refills: 3 | Status: SHIPPED | OUTPATIENT
Start: 2021-06-23 | End: 2021-08-16

## 2021-06-23 RX ORDER — FUROSEMIDE 40 MG/1
40 TABLET ORAL DAILY
Qty: 60 TABLET | Refills: 3 | Status: SHIPPED | OUTPATIENT
Start: 2021-06-24 | End: 2021-10-20

## 2021-06-23 RX ADMIN — LISINOPRIL 20 MG: 20 TABLET ORAL at 05:40

## 2021-06-23 RX ADMIN — AMLODIPINE BESYLATE 10 MG: 10 TABLET ORAL at 05:40

## 2021-06-23 RX ADMIN — CARVEDILOL 6.25 MG: 6.25 TABLET, FILM COATED ORAL at 07:55

## 2021-06-23 RX ADMIN — FUROSEMIDE 40 MG: 40 TABLET ORAL at 05:40

## 2021-06-23 RX ADMIN — TIOTROPIUM BROMIDE INHALATION SPRAY 5 MCG: 3.12 SPRAY, METERED RESPIRATORY (INHALATION) at 07:55

## 2021-06-23 RX ADMIN — ACETAMINOPHEN 650 MG: 325 TABLET, FILM COATED ORAL at 05:40

## 2021-06-23 RX ADMIN — HYDROCHLOROTHIAZIDE 25 MG: 25 TABLET ORAL at 05:40

## 2021-06-23 RX ADMIN — ENOXAPARIN SODIUM 60 MG: 60 INJECTION SUBCUTANEOUS at 00:45

## 2021-06-23 NOTE — DISCHARGE PLANNING
Anticipated Discharge Disposition: Home with O2 and outpatient therapies    Action: [facility] has been accepted. [facility] requesting that CHF be added to O2 order as it is only on F2F. [facility] also requesting a conserving device be added if appropriate for patient. LSW sent voalte message to Dr. Anderson with the above info. [employer] to deliver a tank to bedside for patient by 9 am.    Barriers to Discharge: O2 delivery, order update    Plan: LSW to f/u with medical team    1032: Orders have been updated. LSW to have referral resent.

## 2021-06-23 NOTE — DISCHARGE PLANNING
Agency/Facility Name: [facility]  Spoke To: Marie  Outcome: sent new orders    LSW informed

## 2021-06-23 NOTE — CARE PLAN
The patient is Stable - Low risk of patient condition declining or worsening    Problem: Pain - Standard  Goal: Alleviation of pain or a reduction in pain to the patient’s comfort goal  Outcome: Progressing     Problem: Knowledge Deficit - Standard  Goal: Patient and family/care givers will demonstrate understanding of plan of care, disease process/condition, diagnostic tests and medications  Outcome: Progressing     Problem: Skin Integrity  Goal: Skin integrity is maintained or improved  Outcome: Progressing     Problem: Fall Risk  Goal: Patient will remain free from falls  Outcome: Progressing

## 2021-06-23 NOTE — PROGRESS NOTES
Patient transferred to the discharge [facility] with discharge lounge CNA. All personal belongings collected. Physical therapy prescription, home oxygen, and front wheel walker given to patient. Patient wife at bedside. Patient medical. Report given to Sol guido RN. Follow up appointments scheduled. Patient escorted off unit via wheelchair without incident. No further needs at this time.

## 2021-06-23 NOTE — DISCHARGE PLANNING
Agency/Facility Name: [facility]  Spoke To: Jaymie/ Marie  Outcome: Referral received. Asked for order to add CHF in diagnosis need and if pt can handle a conserving device.    LSW informed      Agency/Facility Name: [facility]  Spoke To: Marie  Outcome: [employer] should be delivering tank by 0900 6/23.    LSW informed

## 2021-06-23 NOTE — CARE PLAN
Problem: Care Map:  Day of Discharge Optimal Outcome for the Heart Failure Patient  Goal: Day of Discharge:  Optimal Care of the heart failure patient  Outcome: Progressing     Problem: Fall Risk  Goal: Patient will remain free from falls  Outcome: Progressing  Note: Fall precautions in place. Yellow wrist band on, appropriate signs at the door, educated on importance, and strip alarm on.      The patient is Stable - Low risk of patient condition declining or worsening    Shift Goals: Home oxygen delivery  Clinical Goals: Discharge with home oxygen  Patient Goals: Ambulate    Progress made toward(s) clinical / shift goals:  Home oxygen scheduled to be delivered at 0900

## 2021-06-23 NOTE — DISCHARGE INSTRUCTIONS
Discharge Instructions    Discharged to home by car with relative. Discharged via wheelchair, hospital escort: Yes.  Special equipment needed: Oxygen and Walker    Be sure to schedule a follow-up appointment with your primary care doctor or any specialists as instructed.     Discharge Plan:   Diet Plan: Discussed  Activity Level: Discussed  Confirmed Follow up Appointment: Appointment Scheduled  Confirmed Symptoms Management: Discussed  Medication Reconciliation Updated: Yes    I understand that a diet low in cholesterol, fat, and sodium is recommended for good health. Unless I have been given specific instructions below for another diet, I accept this instruction as my diet prescription.   Other diet: Cardiac, Two gram Sodium restriction     Special Instructions:   HF Patient Discharge Instructions  · Monitor your weight daily, and maintain a weight chart, to track your weight changes.   · Activity as tolerated, unless your Doctor has ordered otherwise. Other activity order: as tolerated.  · Follow a low fat, low cholesterol, low salt diet unless instructed otherwise by your Doctor. Read the labels on the back of food products and track your intake of fat, cholesterol and salt.   · Fluid Restriction Yes. If a Fluid Restriction has been ordered by your Doctor, measure fluids with a measuring cup to ensure that you are not exceeding the restriction.   · No smoking.  · Oxygen Yes. If your Doctor has ordered that you wear Oxygen at home, it is important to wear it as ordered.  · Did you receive an explanation from staff on the importance of taking each of your medications and why it is necessary to keep taking them unless your doctor says to stop? Yes  · Were all of your questions answered about how to manage your heart failure and what to do if you have increased signs and symptoms after you go home? Yes  · Do you feel like your heart failure care team involved you in the care treatment plan and allowed you to make  decisions regarding your care while in the hospital and addressed any discharge needs you might have? Yes    See the educational handout provided at discharge for more information on monitoring your daily weight, activity and diet. This also explains more about Heart Failure, symptoms of a flare-up and some of the tests that you have undergone.     Warning Signs of a Flare-Up include:  · Swelling in the ankles or lower legs.  · Shortness of breath, while at rest, or while doing normal activities.   · Shortness of breath at night when in bed, or coughing in bed.   · Requiring more pillows to sleep at night, or needing to sit up at night to sleep.  · Feeling weak, dizzy or fatigued.     When to call your Doctor:  · Call [facility] seven days a week from 8:00 a.m. to 8:00 p.m. for medical questions [phone].  · Call your Primary Care Physician or Cardiologist if:   1. You experience any pain radiating to your jaw or neck.  2. You have any difficulty breathing.  3. You experience weight gain of 3 lbs in a day or 5 lbs in a week.   4. You feel any palpitations or irregular heartbeats.  5. You become dizzy or lose consciousness.   If you have had an angiogram or had a pacemaker or AICD placed, and experience:  1. Bleeding, drainage or swelling at the surgical / puncture site.  2. Fever greater than 100.0 F  3. Shock from internal defibrillator.  4. Cool and / or numb extremities.      · Is patient discharged on Warfarin / Coumadin?   No     Heart Failure, Diagnosis    Heart failure is a condition in which the heart has trouble pumping blood because it has become weak or stiff. This means that the heart does not pump blood well enough for the body to stay healthy. For some people with heart failure, fluid may back up into the lungs. There may also be swelling (edema) in the lower legs. Heart failure is usually a long-term (chronic) condition. It is important for you to take good care of yourself and follow  the treatment plan from your health care provider.  What are the causes?  This condition may be caused by:  · High blood pressure (hypertension). Hypertension causes the heart muscle to work harder than normal. This makes the heart stiff or weak.  · Coronary artery disease, or CAD. CAD is the buildup of cholesterol and fat (plaque) in the arteries of the heart.  · Heart attack, also called myocardial infarction. This injures the heart muscle, making it hard for the heart to pump blood.  · Abnormal heart valves. The valves do not open and close properly, forcing the heart to pump harder to keep the blood flowing.  · Heart muscle disease (cardiomyopathy or myocarditis). This is damage to the heart muscle. It can increase the risk of heart failure.  · Lung disease. The heart works harder when the lungs are not healthy.  · Abnormal heart rhythms. These can lead to heart failure.  What increases the risk?  The risk of heart failure increases as a person ages. This condition is also more likely to develop in people who:  · Are overweight.  · Are male.  · Smoke or chew tobacco.  · Abuse alcohol or illegal drugs.  · Have taken medicines that can damage the heart, such as chemotherapy drugs.  · Have diabetes.  · Have abnormal heart rhythms.  · Have thyroid problems.  · Have low blood counts (anemia).  What are the signs or symptoms?  Symptoms of this condition include:  · Shortness of breath with activity, such as when climbing stairs.  · A cough that does not go away.  · Swelling of the feet, ankles, legs, or abdomen.  · Losing weight for no reason.  · Trouble breathing when lying flat (orthopnea).  · Waking from sleep because of the need to sit up and get more air.  · Rapid heartbeat.  · Tiredness (fatigue) and loss of energy.  · Feeling light-headed, dizzy, or close to fainting.  · Loss of appetite.  · Nausea.  · Waking up more often during the night to urinate (nocturia).  · Confusion.  How is this diagnosed?  This  condition is diagnosed based on:  · Your medical history, symptoms, and a physical exam.  · Diagnostic tests, which may include:  ? Echocardiogram.  ? Electrocardiogram (ECG).  ? Chest X-ray.  ? Blood tests.  ? Exercise stress test.  ? Radionuclide scans.  ? Cardiac catheterization and angiogram.  How is this treated?  Treatment for this condition is aimed at managing the symptoms of heart failure.  Medicines  Treatment may include medicines that:  · Help lower blood pressure by relaxing (dilating) the blood vessels. These medicines are called ACE inhibitors (angiotensin-converting enzyme) and ARBs (angiotensin receptor blockers).  · Cause the kidneys to remove salt and water from the blood through urination (diuretics).  · Improve heart muscle strength and prevent the heart from beating too fast (beta blockers).  · Increase the force of the heartbeat (digoxin).  Healthy behavior changes         Treatment may also include making healthy lifestyle changes, such as:  · Reaching and staying at a healthy weight.  · Quitting smoking or chewing tobacco.  · Eating heart-healthy foods.  · Limiting or avoiding alcohol.  · Stopping the use of illegal drugs.  · Being physically active.    Other treatments  Other treatments may include:  · Procedures to open blocked arteries or repair damaged valves.  · Placing a pacemaker to improve heart function (cardiac resynchronization therapy).  · Placing a device to treat serious abnormal heart rhythms (implantable cardioverter defibrillator, or ICD).  · Placing a device to improve the pumping ability of the heart (left ventricular assist device, or LVAD).  · Receiving a healthy heart from a donor (heart transplant). This is done when other treatments have not helped.  Follow these instructions at home:  · Manage other health conditions as told by your health care provider. These may include hypertension, diabetes, thyroid disease, or abnormal heart rhythms.  · Get ongoing education and  support as needed. Learn as much as you can about heart failure.  · Keep all follow-up visits as told by your health care provider. This is important.  Summary  · Heart failure is a condition in which the heart has trouble pumping blood because it has become weak or stiff.  · This condition is caused by high blood pressure and other diseases of the heart and lungs.  · Symptoms of this condition include shortness of breath, tiredness (fatigue), nausea, and swelling of the feet, ankles, legs, or abdomen.  · Treatments for this condition may include medicines, lifestyle changes, and surgery.  · Manage other health conditions as told by your health care provider.  This information is not intended to replace advice given to you by your health care provider. Make sure you discuss any questions you have with your health care provider.  Document Released: 12/18/2006 Document Revised: 03/06/2020 Document Reviewed: 03/06/2020  Elsevier Patient Education © 2020 [employer]    Heart Failure, Diagnosis    Heart failure means that your heart is not able to pump blood in the right way. This makes it hard for your body to work well. Heart failure is usually a long-term (chronic) condition. You must take good care of yourself and follow your treatment plan from your doctor.  What are the causes?  This condition may be caused by:  · High blood pressure.  · Build up of cholesterol and fat in the arteries.  · Heart attack. This injures the heart muscle.  · Heart valves that do not open and close properly.  · Damage of the heart muscle. This is also called cardiomyopathy.  · Lung disease.  · Abnormal heart rhythms.  What increases the risk?  The risk of heart failure goes up as a person ages. This condition is also more likely to develop in people who:  · Are overweight.  · Are male.  · Smoke or chew tobacco.  · Abuse alcohol or illegal drugs.  · Have taken medicines that can damage the heart.  · Have diabetes.  · Have abnormal heart  rhythms.  · Have thyroid problems.  · Have low blood counts (anemia).  What are the signs or symptoms?  Symptoms of this condition include:  · Shortness of breath.  · Coughing.  · Swelling of the feet, ankles, legs, or belly.  · Losing weight for no reason.  · Trouble breathing.  · Waking from sleep because of the need to sit up and get more air.  · Rapid heartbeat.  · Being very tired.  · Feeling dizzy, or feeling like you may pass out (faint).  · Having no desire to eat.  · Feeling like you may vomit (nauseous).  · Peeing (urinating) more at night.  · Feeling confused.  How is this treated?         This condition may be treated with:  · Medicines. These can be given to treat blood pressure and to make the heart muscles stronger.  · Changes in your daily life. These may include eating a healthy diet, staying at a healthy body weight, quitting tobacco and illegal drug use, or doing exercises.  · Surgery. Surgery can be done to open blocked valves, or to put devices in the heart, such as pacemakers.  · A donor heart (heart transplant). You will receive a healthy heart from a donor.  Follow these instructions at home:  · Treat other conditions as told by your doctor. These may include high blood pressure, diabetes, thyroid disease, or abnormal heart rhythms.  · Learn as much as you can about heart failure.  · Get support as you need it.  · Keep all follow-up visits as told by your doctor. This is important.  Summary  · Heart failure means that your heart is not able to pump blood in the right way.  · This condition is caused by high blood pressure, heart attack, or damage of the heart muscle.  · Symptoms of this condition include shortness of breath and swelling of the feet, ankles, legs, or belly. You may also feel very tired or feel like you may vomit.  · You may be treated with medicines, surgery, or changes in your daily life.  · Treat other health conditions as told by your doctor.  This information is not  intended to replace advice given to you by your health care provider. Make sure you discuss any questions you have with your health care provider.  Document Released: 09/26/2009 Document Revised: 03/06/2020 Document Reviewed: 03/06/2020  Elsevier Patient Education © 2020 [employer]    Heart Failure, Self Care  Heart failure is a serious condition. This document explains the things you need to do to take care of yourself after a heart failure diagnosis. You may be asked to change your diet, take certain medicines, and make other lifestyle changes in order to stay as healthy as possible. Your health care provider may also give you more specific instructions. If you have problems or questions, contact your health care provider.  What are the risks?  Having heart failure puts you at higher risk for certain problems. These problems can get worse if you do not take good care of yourself. Problems may include:  · Blood clotting problems. This may cause a stroke.  · Damage to the kidneys, liver, or lungs.  · Abnormal heart rhythms.  Supplies needed:  · Scale for monitoring weight.  · Blood pressure monitor.  · Notebook.  · Medicines.  How to care for yourself when you have heart failure  Medicines  Take over-the-counter and prescription medicines only as told by your health care provider. Medicines reduce the workload of your heart, slow the progression of heart failure, and improve symptoms. Take your medicines every day.  · Do not stop taking your medicine unless your health care provider tells you to do so.  · Do not skip any dose of medicine.  · Refill your prescriptions before you run out of medicine.  Eating and drinking    · Eat heart-healthy foods. Talk with a dietitian to make an eating plan that is right for you.  ? Choose foods that contain no trans fat and are low in saturated fat and cholesterol. Healthy choices include fresh or frozen fruits and vegetables, fish, lean meats, legumes, fat-free or low-fat  dairy products, and whole-grain or high-fiber foods.  ? Limit salt (sodium) if told by your health care provider. Sodium restriction may reduce symptoms of heart failure. Ask a dietitian to recommend heart-healthy seasonings.  ? Use healthy cooking methods instead of frying. Healthy methods include roasting, grilling, broiling, baking, poaching, steaming, and stir-frying.  · Limit your fluid intake, if directed by your health care provider. Fluid restriction may reduce symptoms of heart failure.  Alcohol use  · Do not drink alcohol if:  ? Your health care provider tells you not to drink.  ? Your heart was damaged by alcohol, or you have severe heart failure.  ? You are pregnant, may be pregnant, or are planning to become pregnant.  · If you drink alcohol:  ? Limit how much you use to:  § 0-1 drink a day for women.  § 0-2 drinks a day for men.  ? Be aware of how much alcohol is in your drink. In the [place] one drink equals one 12 oz bottle of beer (355 mL), one 5 oz glass of wine (148 mL), or one 1½ oz glass of hard liquor (44 mL).  Lifestyle    · Do not use any products that contain nicotine or tobacco, such as cigarettes, e-cigarettes, and chewing tobacco. If you need help quitting, ask your health care provider.  ? Do not use nicotine gum or patches before talking to your health care provider.  · Do not use illegal drugs.  · Work with your health care provider to safely reach the right body weight.  · Do physical activity if told by your health care provider. Talk to your health care provider before you begin an exercise if:  ? You are an older adult.  ? You have severe heart failure.  · Learn to manage stress. If you need help to do this, ask your health care provider.  · Participate in or seek rehabilitation as needed to keep or improve your independence and quality of life.  · Plan rest periods when you get tired.  Monitoring important information    · Weigh yourself every day. This will help you to notice if  too much fluid is building up in your body.  ? Weigh yourself every morning after you urinate and before you eat breakfast.  ? Wear the same amount of clothing each time you weigh yourself.  ? Record your daily weight. Provide your health care provider with your weight record.  · Monitor and record your pulse and blood pressure as told by your health care provider.  Dealing with extreme temperatures  · If the weather is extremely hot:  ? Avoid vigorous physical activity.  ? Use air conditioning or fans, or find a cooler location.  ? Avoid caffeine and alcohol.  ? Wear loose-fitting, lightweight, and light-colored clothing.  · If the weather is extremely cold:  ? Avoid vigorous activity.  ? Layer your clothes.  ? Wear mittens or gloves, a hat, and a scarf when you go outside.  ? Avoid alcohol.  Follow these instructions at home:  · Stay up to date with vaccines. Pneumococcal and flu (influenza) vaccines are especially important in preventing infections of the airways.  · Keep all follow-up visits as told by your health care provider. This is important.  Contact a health care provider if you:  · Have a rapid weight gain.  · Have increasing shortness of breath.  · Are unable to participate in your usual physical activities.  · Get tired easily.  · Cough more than normal, especially with physical activity.  · Lose your appetite or feel nauseous.  · Have any swelling or more swelling in areas such as your hands, feet, ankles, or abdomen.  · Are unable to sleep because it is hard to breathe.  · Feel like your heart is beating quickly (palpitations).  · Become dizzy or light-headed when you stand up.  Get help right away if you:  · Have trouble breathing.  · Notice or your family notices a change in your awareness, such as having trouble staying awake or concentrating.  · Have pain or discomfort in your chest.  · Have an episode of fainting (syncope).  These symptoms may represent a serious problem that is an emergency. Do  not wait to see if the symptoms will go away. Get medical help right away. Call your local emergency services (911 in the [place] Do not drive yourself to the hospital.  Summary  · Heart failure is a serious condition. To care for yourself, you may be asked to change your diet, take certain medicines, and make other lifestyle changes.  · Take your medicines every day. Do not stop taking them unless your health care provider tells you to do so.  · Eat heart-healthy foods, such as fresh or frozen fruits and vegetables, fish, lean meats, legumes, fat-free or low-fat dairy products, and whole-grain or high-fiber foods.  · Ask your health care provider if you have any alcohol restrictions. You may have to stop drinking alcohol if you have severe heart failure.  · Contact your health care provider if you notice problems, such as rapid weight gain or a fast heartbeat. Get help right away if you faint, or have chest pain or trouble breathing.  This information is not intended to replace advice given to you by your health care provider. Make sure you discuss any questions you have with your health care provider.  Document Released: 04/01/2020 Document Revised: 03/31/2020 Document Reviewed: 04/01/2020  Elsevier Patient Education © 2020 [employer]    Heart Failure, Self Care  Heart failure is a serious condition. This sheet explains things you need to do to take care of yourself at home. To help you stay as healthy as possible, you may be asked to change your diet, take certain medicines, and make other changes in your life. Your doctor may also give you more specific instructions. If you have problems or questions, call your doctor.  What are the risks?  Having heart failure makes it more likely for you to have some problems. These problems can get worse if you do not take good care of yourself. Problems may include:  · Blood clotting problems. This may cause a stroke.  · Damage to the kidneys, liver, or lungs.  · Abnormal  heart rhythms.  Supplies needed:  · Scale for weighing yourself.  · Blood pressure monitor.  · Notebook.  · Medicines.  How to care for yourself when you have heart failure  Medicines  Take over-the-counter and prescription medicines only as told by your doctor. Take your medicines every day.  · Do not stop taking your medicine unless your doctor tells you to do so.  · Do not skip any medicines.  · Get your prescriptions refilled before you run out of medicine. This is important.  Eating and drinking    · Eat heart-healthy foods. Talk with a diet specialist (dietitian) to create an eating plan.  · Choose foods that:  ? Have no trans fat.  ? Are low in saturated fat and cholesterol.  · Choose healthy foods, such as:  ? Fresh or frozen fruits and vegetables.  ? Fish.  ? Low-fat (lean) meats.  ? Legumes, such as beans, peas, and lentils.  ? Fat-free or low-fat dairy products.  ? Whole-grain foods.  ? High-fiber foods.  · Limit salt (sodium) if told by your doctor. Ask your diet specialist to tell you which seasonings are healthy for your heart.  · Cook in healthy ways instead of frying. Healthy ways of cooking include roasting, grilling, broiling, baking, poaching, steaming, and stir-frying.  · Limit how much fluid you drink, if told by your doctor.  Alcohol use  · Do not drink alcohol if:  ? Your doctor tells you not to drink.  ? Your heart was damaged by alcohol, or you have very bad heart failure.  ? You are pregnant, may be pregnant, or are planning to become pregnant.  · If you drink alcohol:  ? Limit how much you use to:  § 0-1 drink a day for women.  § 0-2 drinks a day for men.  ? Be aware of how much alcohol is in your drink. In the [place] one drink equals one 12 oz bottle of beer (355 mL), one 5 oz glass of wine (148 mL), or one 1½ oz glass of hard liquor (44 mL).  Lifestyle    · Do not use any products that contain nicotine or tobacco, such as cigarettes, e-cigarettes, and chewing tobacco. If you need help  quitting, ask your doctor.  ? Do not use nicotine gum or patches before talking to your doctor.  · Do not use illegal drugs.  · Lose weight if told by your doctor.  · Do physical activity if told by your doctor. Talk to your doctor before you begin an exercise if:  ? You are an older adult.  ? You have very bad heart failure.  · Learn to manage stress. If you need help, ask your doctor.  · Get rehab (rehabilitation) to help you stay independent and to help with your quality of life.  · Plan time to rest when you get tired.  Check weight and blood pressure    · Weigh yourself every day. This will help you to know if fluid is building up in your body.  ? Weigh yourself every morning after you pee (urinate) and before you eat breakfast.  ? Wear the same amount of clothing each time.  ? Write down your daily weight. Give your record to your doctor.  · Check and write down your blood pressure as told by your doctor.  · Check your pulse as told by your doctor.  Dealing with very hot and very cold weather  · If it is very hot:  ? Avoid activities that take a lot of energy.  ? Use air conditioning or fans, or find a cooler place.  ? Avoid caffeine and alcohol.  ? Wear clothing that is loose-fitting, lightweight, and light-colored.  · If it is very cold:  ? Avoid activities that take a lot of energy.  ? Layer your clothes.  ? Wear mittens or gloves, a hat, and a scarf when you go outside.  ? Avoid alcohol.  Follow these instructions at home:  · Stay up to date with shots (vaccines). Get pneumococcal and flu (influenza) shots.  · Keep all follow-up visits as told by your doctor. This is important.  Contact a doctor if:  · You gain weight quickly.  · You have increasing shortness of breath.  · You cannot do your normal activities.  · You get tired easily.  · You cough a lot.  · You don't feel like eating or feel like you may vomit (nauseous).  · You become puffy (swell) in your hands, feet, ankles, or belly (abdomen).  · You  cannot sleep well because it is hard to breathe.  · You feel like your heart is beating fast (palpitations).  · You get dizzy when you stand up.  Get help right away if:  · You have trouble breathing.  · You or someone else notices a change in your behavior, such as having trouble staying awake.  · You have chest pain or discomfort.  · You pass out (faint).  These symptoms may be an emergency. Do not wait to see if the symptoms will go away. Get medical help right away. Call your local emergency services (911 in the [place] Do not drive yourself to the hospital.  Summary  · Heart failure is a serious condition. To care for yourself, you may have to change your diet, take medicines, and make other lifestyle changes.  · Take your medicines every day. Do not stop taking them unless your doctor tells you to do so.  · Eat heart-healthy foods, such as fresh or frozen fruits and vegetables, fish, lean meats, legumes, fat-free or low-fat dairy products, and whole-grain or high-fiber foods.  · Ask your doctor if you can drink alcohol. You may have to stop alcohol use if you have very bad heart failure.  · Contact your doctor if you gain weight quickly or feel that your heart is beating too fast. Get help right away if you pass out, or have chest pain or trouble breathing.  This information is not intended to replace advice given to you by your health care provider. Make sure you discuss any questions you have with your health care provider.  Document Released: 04/01/2020 Document Revised: 03/31/2020 Document Reviewed: 04/01/2020  [employer] Patient Education © 2020 [employer]    Home Oxygen Use, Adult  When a medical condition keeps you from getting enough oxygen, your health care provider may instruct you to take extra oxygen at home. Your health care provider will let you know:  · When to take oxygen.  · For how long to take oxygen.  · How quickly oxygen should be delivered (flow rate), in liters per minute (LPM or  L/M).  Home oxygen can be given through:  · A mask.  · A nasal cannula. This is a device or tube that goes in the nostrils.  · A transtracheal catheter. This is a small, flexible tube placed in the trachea.  · A tracheostomy. This is a surgically made opening in the trachea.  These devices are connected with tubing to an oxygen source, such as:  · A tank. Tanks hold oxygen in gas form. They must be replaced when the oxygen is used up.  · A liquid oxygen device. This holds oxygen in liquid form. It must be replaced when the oxygen is used up.  · An oxygen concentrator machine. This filters oxygen in the room. It uses electricity, so you must have a backup cylinder of oxygen in case the power goes out.  Supplies needed:  To use oxygen, you will need:  · A mask, nasal cannula, transtracheal catheter, or tracheostomy.  · An oxygen tank, a liquid oxygen device, or an oxygen concentrator.  · The tape that your health care provider recommends (optional).  If you use a transtracheal catheter and your prescribed flow rate is 1 LPM or greater, you will also need a humidifier.  Risks and complications  · Fire. This can happen if the oxygen is exposed to a heat source, flame, or spark.  · Injury to skin. This can happen if liquid oxygen touches your skin.  · Organ damage. This can happen if you get too little oxygen.  How to use oxygen  Your health care provider or a representative from your medical device company will show you how to use your oxygen device. Follow her or his instructions. The instructions may look something like this:  1. Wash your hands.  2. If you use an oxygen concentrator, make sure it is plugged in.  3. Place one end of the tube into the port on the tank, device, or machine.  4. Place the mask over your nose and mouth. Or, place the nasal cannula and secure it with tape if instructed. If you use a tracheostomy or transtracheal catheter, connect it to the oxygen source as directed.  5. Make sure the  "liter-flow setting on the machine is at the level prescribed by your health care provider.  6. Turn on the machine or adjust the knob on the tank or device to the correct liter-flow setting.  7. When you are done, turn off and unplug the machine, or turn the knob to OFF.  How to clean and care for the oxygen supplies  Nasal cannula  · Clean it with a warm, wet cloth daily or as needed.  · Wash it with a liquid soap once a week.  · Rinse it thoroughly once or twice a week.  · Replace it every 2-4 weeks.  · If you have an infection, such as a cold or pneumonia, change the cannula when you get better.  Mask  · Replace it every 2-4 weeks.  · If you have an infection, such as a cold or pneumonia, change the mask when you get better.  Humidifier bottle  · Wash the bottle between each refill:  ? Wash it with soap and warm water.  ? Rinse it thoroughly.  ? Disinfect it and its top.  ? Air-dry it.  · Make sure it is dry before you refill it.  Oxygen concentrator  · Clean the air filter at least twice a week according to directions from your home medical equipment and service company.  · Wipe down the cabinet every day. To do this:  ? Unplug the unit.  ? Wipe down the cabinet with a damp cloth.  ? Dry the cabinet.  Other equipment  · Change any extra tubing every 1-3 months.  · Follow instructions from your health care provider about taking care of any other equipment.  Safety tips  Fire safety tips    · Keep your oxygen and oxygen supplies at least 5 ft away from sources of heat, flames, and smith at all times.  · Do not allow smoking near your oxygen. Put up \"no smoking\" signs in your home. Avoid smoking areas when in public.  · Do not use materials that can burn (are flammable) while you use oxygen.  · When you go to a restaurant with portable oxygen, ask to be seated in the nonsmoking section.  · Keep a fire extinguisher close by. Let your fire department know that you have oxygen in your home.  · Test your home smoke " detectors regularly.  Traveling  · Secure your oxygen tank in the vehicle so that it does not move around. Follow instructions from your medical device company about how to safely secure your tank.  · Make sure you have enough oxygen for the amount of time you will be away from home.  · If you are planning air travel, contact the airline to find out if they allow the use of an approved portable oxygen concentrator. You may also need documents from your health care provider and medical device company before you travel.  General safety tips  · If you use an oxygen cylinder, make sure it is in a stand or secured to an object that will not move (fixed object).  · If you use liquid oxygen, make sure its container is kept upright.  · If you use an oxygen concentrator:  ? Tell your electric company. Make sure you are given priority service in the event that your power goes out.  ? Avoid using extension cords, if possible.  Follow these instructions at home:  · Use oxygen only as told by your health care provider.  · Do not use alcohol or other drugs that make you relax (sedating drugs) unless instructed. They can slow down your breathing rate and make it hard to get in enough oxygen.  · Know how and when to order a refill of oxygen.  · Always keep a spare tank of oxygen. Plan ahead for holidays when you may not be able to get a prescription filled.  · Use water-based lubricants on your lips or nostrils. Do not use oil-based products like petroleum jelly.  · To prevent skin irritation on your cheeks or behind your ears, tuck some gauze under the tubing.  Contact a health care provider if:  · You get headaches often.  · You have shortness of breath.  · You have a lasting cough.  · You have anxiety.  · You are sleepy all the time.  · You develop an illness that affects your breathing.  · You cannot exercise at your regular level.  · You are restless.  · You have difficult or irregular breathing, and it is getting  worse.  · You have a fever.  · You have persistent redness under your nose.  Get help right away if:  · You are confused.  · You have blue lips or fingernails.  · You are struggling to breathe.  Summary  · Your health care provider or a representative from your medical device company will show you how to use your oxygen device. Follow her or his instructions.  · If you use an oxygen concentrator, make sure it is plugged in.  · Make sure the liter-flow setting on the machine is at the level prescribed by your health care provider.  · Keep your oxygen and oxygen supplies at least 5 ft away from sources of heat, flames, and smith at all times.  This information is not intended to replace advice given to you by your health care provider. Make sure you discuss any questions you have with your health care provider.  Document Released: 03/09/2005 Document Revised: 06/06/2019 Document Reviewed: 07/11/2017  [employer] Patient Education © 2020 [employer]    Acute Respiratory Failure, Adult    Acute respiratory failure occurs when there is not enough oxygen passing from your lungs to your body. When this happens, your lungs have trouble removing carbon dioxide from the blood. This causes your blood oxygen level to drop too low as carbon dioxide builds up.  Acute respiratory failure is a medical emergency. It can develop quickly, but it is temporary if treated promptly. Your lung capacity, or how much air your lungs can hold, may improve with time, exercise, and treatment.  What are the causes?  There are many possible causes of acute respiratory failure, including:  · Lung injury.  · Chest injury or damage to the ribs or tissues near the lungs.  · Lung conditions that affect the flow of air and blood into and out of the lungs, such as pneumonia, acute respiratory distress syndrome, and cystic fibrosis.  · Medical conditions, such as strokes or spinal cord injuries, that affect the muscles and nerves that control  breathing.  · Blood infection (sepsis).  · Inflammation of the pancreas (pancreatitis).  · A blood clot in the lungs (pulmonary embolism).  · A large-volume blood transfusion.  · Burns.  · Near-drowning.  · Seizure.  · Smoke inhalation.  · Reaction to medicines.  · Alcohol or drug overdose.  What increases the risk?  This condition is more likely to develop in people who have:  · A blocked airway.  · Asthma.  · A condition or disease that damages or weakens the muscles, nerves, bones, or tissues that are involved in breathing.  · A serious infection.  · A health problem that blocks the unconscious reflex that is involved in breathing, such as hypothyroidism or sleep apnea.  · A lung injury or trauma.  What are the signs or symptoms?  Trouble breathing is the main symptom of acute respiratory failure. Symptoms may also include:  · Rapid breathing.  · Restlessness or anxiety.  · Skin, lips, or fingernails that appear blue (cyanosis).  · Rapid heart rate.  · Abnormal heart rhythms (arrhythmias).  · Confusion or changes in behavior.  · Tiredness or loss of energy.  · Feeling sleepy or having a loss of consciousness.  How is this diagnosed?  Your health care provider can diagnose acute respiratory failure with a medical history and physical exam. During the exam, your health care provider will listen to your heart and check for crackling or wheezing sounds in your lungs. Your may also have tests to confirm the diagnosis and determine what is causing respiratory failure. These tests may include:  · Measuring the amount of oxygen in your blood (pulse oximetry). The measurement comes from a small device that is placed on your finger, earlobe, or toe.  · Other blood tests to measure blood gases and to look for signs of infection.  · Sampling your cerebral spinal fluid or tracheal fluid to check for infections.  · Chest X-ray to look for fluid in spaces that should be filled with air.  · Electrocardiogram (ECG) to look at the  heart's electrical activity.  How is this treated?  Treatment for this condition usually takes places in a hospital intensive care unit (ICU). Treatment depends on what is causing the condition. It may include one or more treatments until your symptoms improve. Treatment may include:  · Supplemental oxygen. Extra oxygen is given through a tube in the nose, a face mask, or a ayala.  · A device such as a continuous positive airway pressure (CPAP) or bi-level positive airway pressure (BiPAP or BPAP) machine. This treatment uses mild air pressure to keep the airways open. A mask or other device will be placed over your nose or mouth. A tube that is connected to a motor will deliver oxygen through the mask.  · Ventilator. This treatment helps move air into and out of the lungs. This may be done with a bag and mask or a machine. For this treatment, a tube is placed in your windpipe (trachea) so air and oxygen can flow to the lungs.  · Extracorporeal membrane oxygenation (ECMO). This treatment temporarily takes over the function of the heart and lungs, supplying oxygen and removing carbon dioxide. ECMO gives the lungs a chance to recover. It may be used if a ventilator is not effective.  · Tracheostomy. This is a procedure that creates a hole in the neck to insert a breathing tube.  · Receiving fluids and medicines.  · Rocking the bed to help breathing.  Follow these instructions at home:  · Take over-the-counter and prescription medicines only as told by your health care provider.  · Return to normal activities as told by your health care provider. Ask your health care provider what activities are safe for you.  · Keep all follow-up visits as told by your health care provider. This is important.  How is this prevented?  Treating infections and medical conditions that may lead to acute respiratory failure can help prevent the condition from developing.  Contact a health care provider if:  · You have a fever.  · Your  symptoms do not improve or they get worse.  Get help right away if:  · You are having trouble breathing.  · You lose consciousness.  · Your have cyanosis or turn blue.  · You develop a rapid heart rate.  · You are confused.  These symptoms may represent a serious problem that is an emergency. Do not wait to see if the symptoms will go away. Get medical help right away. Call your local emergency services (911 in the [place] Do not drive yourself to the hospital.  This information is not intended to replace advice given to you by your health care provider. Make sure you discuss any questions you have with your health care provider.  Document Released: 12/23/2014 Document Revised: 11/30/2018 Document Reviewed: 07/05/2017  [employer] Patient Education © 2020 [employer]    Sleep Apnea  Sleep apnea affects breathing during sleep. It causes breathing to stop for a short time or to become shallow. It can also increase the risk of:  · Heart attack.  · Stroke.  · Being very overweight (obese).  · Diabetes.  · Heart failure.  · Irregular heartbeat.  The goal of treatment is to help you breathe normally again.  What are the causes?  There are three kinds of sleep apnea:  · Obstructive sleep apnea. This is caused by a blocked or collapsed airway.  · Central sleep apnea. This happens when the brain does not send the right signals to the muscles that control breathing.  · Mixed sleep apnea. This is a combination of obstructive and central sleep apnea.  The most common cause of this condition is a collapsed or blocked airway. This can happen if:  · Your throat muscles are too relaxed.  · Your tongue and tonsils are too large.  · You are overweight.  · Your airway is too small.  What increases the risk?  · Being overweight.  · Smoking.  · Having a small airway.  · Being older.  · Being male.  · Drinking alcohol.  · Taking medicines to calm yourself (sedatives or tranquilizers).  · Having family members with the condition.  What are  the signs or symptoms?  · Trouble staying asleep.  · Being sleepy or tired during the day.  · Getting angry a lot.  · Loud snoring.  · Headaches in the morning.  · Not being able to focus your mind (concentrate).  · Forgetting things.  · Less interest in sex.  · Mood swings.  · Personality changes.  · Feelings of sadness (depression).  · Waking up a lot during the night to pee (urinate).  · Dry mouth.  · Sore throat.  How is this diagnosed?  · Your medical history.  · A physical exam.  · A test that is done when you are sleeping (sleep study). The test is most often done in a sleep lab but may also be done at home.  How is this treated?    · Sleeping on your side.  · Using a medicine to get rid of mucus in your nose (decongestant).  · Avoiding the use of alcohol, medicines to help you relax, or certain pain medicines (narcotics).  · Losing weight, if needed.  · Changing your diet.  · Not smoking.  · Using a machine to open your airway while you sleep, such as:  ? An oral appliance. This is a mouthpiece that shifts your lower jaw forward.  ? A CPAP device. This device blows air through a mask when you breathe out (exhale).  ? An EPAP device. This has valves that you put in each nostril.  ? A BPAP device. This device blows air through a mask when you breathe in (inhale) and breathe out.  · Having surgery if other treatments do not work.  It is important to get treatment for sleep apnea. Without treatment, it can lead to:  · High blood pressure.  · Coronary artery disease.  · In men, not being able to have an erection (impotence).  · Reduced thinking ability.  Follow these instructions at home:  Lifestyle  · Make changes that your doctor recommends.  · Eat a healthy diet.  · Lose weight if needed.  · Avoid alcohol, medicines to help you relax, and some pain medicines.  · Do not use any products that contain nicotine or tobacco, such as cigarettes, e-cigarettes, and chewing tobacco. If you need help quitting, ask your  doctor.  General instructions  · Take over-the-counter and prescription medicines only as told by your doctor.  · If you were given a machine to use while you sleep, use it only as told by your doctor.  · If you are having surgery, make sure to tell your doctor you have sleep apnea. You may need to bring your device with you.  · Keep all follow-up visits as told by your doctor. This is important.  Contact a doctor if:  · The machine that you were given to use during sleep bothers you or does not seem to be working.  · You do not get better.  · You get worse.  Get help right away if:  · Your chest hurts.  · You have trouble breathing in enough air.  · You have an uncomfortable feeling in your back, arms, or stomach.  · You have trouble talking.  · One side of your body feels weak.  · A part of your face is hanging down.  These symptoms may be an emergency. Do not wait to see if the symptoms will go away. Get medical help right away. Call your local emergency services (911 in the [place] Do not drive yourself to the hospital.  Summary  · This condition affects breathing during sleep.  · The most common cause is a collapsed or blocked airway.  · The goal of treatment is to help you breathe normally while you sleep.  This information is not intended to replace advice given to you by your health care provider. Make sure you discuss any questions you have with your health care provider.  Document Released: 09/26/2009 Document Revised: 10/04/2019 Document Reviewed: 08/13/2019  [employer] Patient Education © 2020 [employer]    Hypertension, Adult  Hypertension is another name for high blood pressure. High blood pressure forces your heart to work harder to pump blood. This can cause problems over time.  There are two numbers in a blood pressure reading. There is a top number (systolic) over a bottom number (diastolic). It is best to have a blood pressure that is below 120/80. Healthy choices can help lower your blood  pressure, or you may need medicine to help lower it.  What are the causes?  The cause of this condition is not known. Some conditions may be related to high blood pressure.  What increases the risk?  · Smoking.  · Having type 2 diabetes mellitus, high cholesterol, or both.  · Not getting enough exercise or physical activity.  · Being overweight.  · Having too much fat, sugar, calories, or salt (sodium) in your diet.  · Drinking too much alcohol.  · Having long-term (chronic) kidney disease.  · Having a family history of high blood pressure.  · Age. Risk increases with age.  · Race. You may be at higher risk if you are .  · Gender. Men are at higher risk than women before age 45. After age 65, women are at higher risk than men.  · Having obstructive sleep apnea.  · Stress.  What are the signs or symptoms?  · High blood pressure may not cause symptoms. Very high blood pressure (hypertensive crisis) may cause:  ? Headache.  ? Feelings of worry or nervousness (anxiety).  ? Shortness of breath.  ? Nosebleed.  ? A feeling of being sick to your stomach (nausea).  ? Throwing up (vomiting).  ? Changes in how you see.  ? Very bad chest pain.  ? Seizures.  How is this treated?  · This condition is treated by making healthy lifestyle changes, such as:  ? Eating healthy foods.  ? Exercising more.  ? Drinking less alcohol.  · Your health care provider may prescribe medicine if lifestyle changes are not enough to get your blood pressure under control, and if:  ? Your top number is above 130.  ? Your bottom number is above 80.  · Your personal target blood pressure may vary.  Follow these instructions at home:  Eating and drinking    · If told, follow the DASH eating plan. To follow this plan:  ? Fill one half of your plate at each meal with fruits and vegetables.  ? Fill one fourth of your plate at each meal with whole grains. Whole grains include whole-wheat pasta, brown rice, and whole-grain bread.  ? Eat or  drink low-fat dairy products, such as skim milk or low-fat yogurt.  ? Fill one fourth of your plate at each meal with low-fat (lean) proteins. Low-fat proteins include fish, chicken without skin, eggs, beans, and tofu.  ? Avoid fatty meat, cured and processed meat, or chicken with skin.  ? Avoid pre-made or processed food.  · Eat less than 1,500 mg of salt each day.  · Do not drink alcohol if:  ? Your doctor tells you not to drink.  ? You are pregnant, may be pregnant, or are planning to become pregnant.  · If you drink alcohol:  ? Limit how much you use to:  § 0-1 drink a day for women.  § 0-2 drinks a day for men.  ? Be aware of how much alcohol is in your drink. In the [place] one drink equals one 12 oz bottle of beer (355 mL), one 5 oz glass of wine (148 mL), or one 1½ oz glass of hard liquor (44 mL).  Lifestyle    · Work with your doctor to stay at a healthy weight or to lose weight. Ask your doctor what the best weight is for you.  · Get at least 30 minutes of exercise most days of the week. This may include walking, swimming, or biking.  · Get at least 30 minutes of exercise that strengthens your muscles (resistance exercise) at least 3 days a week. This may include lifting weights or doing Pilates.  · Do not use any products that contain nicotine or tobacco, such as cigarettes, e-cigarettes, and chewing tobacco. If you need help quitting, ask your doctor.  · Check your blood pressure at home as told by your doctor.  · Keep all follow-up visits as told by your doctor. This is important.  Medicines  · Take over-the-counter and prescription medicines only as told by your doctor. Follow directions carefully.  · Do not skip doses of blood pressure medicine. The medicine does not work as well if you skip doses. Skipping doses also puts you at risk for problems.  · Ask your doctor about side effects or reactions to medicines that you should watch for.  Contact a doctor if you:  · Think you are having a reaction to  the medicine you are taking.  · Have headaches that keep coming back (recurring).  · Feel dizzy.  · Have swelling in your ankles.  · Have trouble with your vision.  Get help right away if you:  · Get a very bad headache.  · Start to feel mixed up (confused).  · Feel weak or numb.  · Feel faint.  · Have very bad pain in your:  ? Chest.  ? Belly (abdomen).  · Throw up more than once.  · Have trouble breathing.  Summary  · Hypertension is another name for high blood pressure.  · High blood pressure forces your heart to work harder to pump blood.  · For most people, a normal blood pressure is less than 120/80.  · Making healthy choices can help lower blood pressure. If your blood pressure does not get lower with healthy choices, you may need to take medicine.  This information is not intended to replace advice given to you by your health care provider. Make sure you discuss any questions you have with your health care provider.  Document Released: 06/05/2009 Document Revised: 08/28/2019 Document Reviewed: 08/28/2019  [employer] Patient Education © 2020 [employer]    Acute Kidney Injury, Adult    Acute kidney injury is a sudden worsening of kidney function. The kidneys are organs that have several jobs. They filter the blood to remove waste products and extra fluid. They also maintain a healthy balance of minerals and hormones in the body, which helps control blood pressure and keep bones strong. With this condition, your kidneys do not do their jobs as well as they should.  This condition ranges from mild to severe. Over time it may develop into long-lasting (chronic) kidney disease. Early detection and treatment may prevent acute kidney injury from developing into a chronic condition.  What are the causes?  Common causes of this condition include:  · A problem with blood flow to the kidneys. This may be caused by:  ? Low blood pressure (hypotension) or shock.  ? Blood loss.  ? Heart and blood vessel (cardiovascular)  disease.  ? Severe burns.  ? Liver disease.  · Direct damage to the kidneys. This may be caused by:  ? Certain medicines.  ? A kidney infection.  ? Poisoning.  ? Being around or in contact with toxic substances.  ? A surgical wound.  ? A hard, direct hit to the kidney area.  · A sudden blockage of urine flow. This may be caused by:  ? Cancer.  ? Kidney stones.  ? An enlarged prostate in males.  What are the signs or symptoms?  Symptoms of this condition may not be obvious until the condition becomes severe. Symptoms of this condition can include:  · Tiredness (lethargy), or difficulty staying awake.  · Nausea or vomiting.  · Swelling (edema) of the face, legs, ankles, or feet.  · Problems with urination, such as:  ? Abdominal pain, or pain along the side of your stomach (flank).  ? Decreased urine production.  ? Decrease in the force of urine flow.  · Muscle twitches and cramps, especially in the legs.  · Confusion or trouble concentrating.  · Loss of appetite.  · Fever.  How is this diagnosed?  This condition may be diagnosed with tests, including:  · Blood tests.  · Urine tests.  · Imaging tests.  · A test in which a sample of tissue is removed from the kidneys to be examined under a microscope (kidney biopsy).  How is this treated?  Treatment for this condition depends on the cause and how severe the condition is. In mild cases, treatment may not be needed. The kidneys may heal on their own. In more severe cases, treatment will involve:  · Treating the cause of the kidney injury. This may involve changing any medicines you are taking or adjusting your dosage.  · Fluids. You may need specialized IV fluids to balance your body's needs.  · Having a catheter placed to drain urine and prevent blockages.  · Preventing problems from occurring. This may mean avoiding certain medicines or procedures that can cause further injury to the kidneys.  In some cases treatment may also require:  · A procedure to remove toxic  wastes from the body (dialysis or continuous renal replacement therapy - CRRT).  · Surgery. This may be done to repair a torn kidney, or to remove the blockage from the urinary system.  Follow these instructions at home:  Medicines  · Take over-the-counter and prescription medicines only as told by your health care provider.  · Do not take any new medicines without your health care provider's approval. Many medicines can worsen your kidney damage.  · Do not take any vitamin and mineral supplements without your health care provider's approval. Many nutritional supplements can worsen your kidney damage.  Lifestyle  · If your health care provider prescribed changes to your diet, follow them. You may need to decrease the amount of protein you eat.  · Achieve and maintain a healthy weight. If you need help with this, ask your health care provider.  · Start or continue an exercise plan. Try to exercise at least 30 minutes a day, 5 days a week.  · Do not use any tobacco products, such as cigarettes, chewing tobacco, and e-cigarettes. If you need help quitting, ask your health care provider.  General instructions  · Keep track of your blood pressure. Report changes in your blood pressure as told by your health care provider.  · Stay up to date with immunizations. Ask your health care provider which immunizations you need.  · Keep all follow-up visits as told by your health care provider. This is important.  Where to find more information  · American Association of Kidney Patients: www.aakp.org  · National Kidney Foundation: www.kidney.org  · American Kidney Fund: www.akfinc.org  · Life Options Rehabilitation Program:  ? www.lifeoptions.org  ? www.kidneyschool.org  Contact a health care provider if:  · Your symptoms get worse.  · You develop new symptoms.  Get help right away if:  · You develop symptoms of worsening kidney disease, which include:  ? Headaches.  ? Abnormally dark or light skin.  ? Easy bruising.  ? Frequent  hiccups.  ? Chest pain.  ? Shortness of breath.  ? End of menstruation in women.  ? Seizures.  ? Confusion or altered mental status.  ? Abdominal or back pain.  ? Itchiness.  · You have a fever.  · Your body is producing less urine.  · You have pain or bleeding when you urinate.  Summary  · Acute kidney injury is a sudden worsening of kidney function.  · Acute kidney injury can be caused by problems with blood flow to the kidneys, direct damage to the kidneys, and sudden blockage of urine flow.  · Symptoms of this condition may not be obvious until it becomes severe. Symptoms may include edema, lethargy, confusion, nausea or vomiting, and problems passing urine.  · This condition can usually be diagnosed with blood tests, urine tests, and imaging tests. Sometimes a kidney biopsy is done to diagnose this condition.  · Treatment for this condition often involves treating the underlying cause. It is treated with fluids, medicines, dialysis, diet changes, or surgery.  This information is not intended to replace advice given to you by your health care provider. Make sure you discuss any questions you have with your health care provider.  Document Released: 07/02/2012 Document Revised: 11/30/2018 Document Reviewed: 12/08/2017  [employer] Patient Education © 2020 [employer]    Depression / Suicide Risk    As you are discharged from this [facility] facility, it is important to learn how to keep safe from harming yourself.    Recognize the warning signs:  · Abrupt changes in personality, positive or negative- including increase in energy   · Giving away possessions  · Change in eating patterns- significant weight changes-  positive or negative  · Change in sleeping patterns- unable to sleep or sleeping all the time   · Unwillingness or inability to communicate  · Depression  · Unusual sadness, discouragement and loneliness  · Talk of wanting to die  · Neglect of personal appearance   · Rebelliousness- reckless  behavior  · Withdrawal from people/activities they love  · Confusion- inability to concentrate     If you or a loved one observes any of these behaviors or has concerns about self-harm, here's what you can do:  · Talk about it- your feelings and reasons for harming yourself  · Remove any means that you might use to hurt yourself (examples: pills, rope, extension cords, firearm)  · Get professional help from the community (Mental Health, Substance Abuse, psychological counseling)  · Do not be alone:Call your Safe Contact- someone whom you trust who will be there for you.  · Call your local CRISIS HOTLINE 738-5390 or 425-093-0922  · Call your local Children's Mobile Crisis Response Team [place] (490) 163-7636 or www.[place] Department of Education  · Call the toll free National Suicide Prevention Hotlines   · National Suicide Prevention Lifeline 138-891-VJPV (7757)  · National Hope Line Network 800-SUICIDE (335-5476)

## 2021-06-23 NOTE — PROGRESS NOTES
D/C'd with portable home O2 tanks and FWW. HF Discharge instructions provided to pt.  Pt states understanding.  Pt states all questions have been answered.  Copy of discharge provided to pt.  Signed copy in chart.   Pt states that all personal belongings are in possession.   Pt escorted off unit with assistance from this RN and CNA without incident.

## 2021-06-23 NOTE — PROGRESS NOTES
Bedside report received from Morena MCKNIGHT Pt A&Ox4.  POC discussed with pt. Pt verbalizes understanding. Call light and belongings within reach. Bed locked and in lowest position. Alarm and fall precautions in place.

## 2021-06-24 ENCOUNTER — TELEPHONE (OUTPATIENT)
Dept: SCHEDULING | Facility: IMAGING CENTER | Age: 46
End: 2021-06-24

## 2021-06-24 NOTE — DISCHARGE SUMMARY
Discharge Summary    Date of Admission: 6/18/2021  Date of Discharge: 6/23/2021 10:23 AM  Discharging Attending: Dr Monk    Discharging Senior Resident:        CHIEF COMPLAINT ON ADMISSION  Chief Complaint   Patient presents with   • Shortness of Breath       Reason for Admission  Shortness of breath    Admission Date  6/18/2021    CODE STATUS  Full code    HPI & HOSPITAL COURSE  This is a 45 y.o. male here with shortness of breath.    Mr. Moreira is a 45 years old male who was admitted to the hospital on June 18, 2021 with complaints of shortness of breath with underlying past medical history of congestive heart failure, hypertension, obstructive sleep apnea (on CPAP) and morbid obesity.  Patient was transferred from [facility] emergency department to [facility] with complaints of shortness of breath with 30 pounds of weight gain in the prior 2 weeks and elevated BNP and D-dimer.  No oxygen at baseline, patient was found to have acute hypoxemic respiratory failure.    1.  Acute hypoxemic respiratory failure:  Echocardiogram obtained revealed preserved left ventricular ejection fraction with normal regional wall motion but severely dilated right ventricle with concern for right ventricular dysfunction. Patient was initiated on diuresis, given elevated D-dimer CTA PE was obtained which was negative for any evidence of pulmonary embolism.  Patient was diagnosed with right-sided heart failure likely in the setting of longstanding obstructive sleep apnea, obesity hypoventilation syndrome.Patient was continued on diuresis and transition to oral diuretic therapy.  Patient was diuresed net -7.5 L during the hospitalization.  On presentation weight  387 pounds, prior to discharge  with a dry standing weight of 365 pounds.  Patient's baseline Lasix of 20 mg once daily increased to 40 mg once daily and patient advised to follow-up with PCP in the outpatient setting for ongoing monitoring of diuretic  therapy. Patient required oxygen, Home oxygen evaluation was obtained  and oxygen was arranged upon discharge (4 L of O2 with ambulation and resting).      2. Hypertension: Patient continued on lisinopril, hydrochlorothiazide and amlodipine (home medication ) and carvedilol initiated during hospitalization .      3.  Morbid obesity Body mass index is 66.79 kg/m².  Weight loss advised, lifestyle changes discussed.    4.  Obstructive sleep apnea: Sleep study referral placed and patient advised outpatient follow-up with pulmonology/sleep clinic.    5.  Mild  debility: Was evaluated by physical therapy and Occupational Therapy with recommendations for home health care.  Home health care could not be arranged per case management, given this patient was provided prescriptions for outpatient physical therapy and Occupational Therapy.    Therefore, he is discharged in fair and stable condition to home with close outpatient follow-up.    The patient met 2-midnight criteria for an inpatient stay at the time of discharge.    PHYSICAL EXAM ON DISCHARGE  Temp:  [36.1 °C (97 °F)-36.5 °C (97.7 °F)] 36.1 °C (97 °F)  Pulse:  [86-92] 92  Resp:  [18] 18  BP: (127-138)/(79-88) 135/87  SpO2:  [97 %-98 %] 98 %    Physical Exam  Constitutional:       General: He is not in acute distress.     Appearance: He is obese. He is not ill-appearing.   HENT:      Head: Normocephalic and atraumatic.      Nose: Nose normal.   Eyes:      Extraocular Movements: Extraocular movements intact.      Pupils: Pupils are equal, round, and reactive to light.   Cardiovascular:      Rate and Rhythm: Normal rate and regular rhythm.      Pulses: Normal pulses.      Heart sounds: No murmur heard.   No friction rub. No gallop.    Pulmonary:      Effort: Pulmonary effort is normal. No respiratory distress.   Abdominal:      General: Bowel sounds are normal.      Palpations: Abdomen is soft.      Tenderness: There is no abdominal tenderness.   Musculoskeletal:       Right lower leg: Edema present.      Left lower leg: Edema present.   Skin:     General: Skin is warm.   Neurological:      General: No focal deficit present.      Mental Status: He is alert. Mental status is at baseline.   Psychiatric:         Mood and Affect: Mood normal.         Discharge Date  6/23/2021    FOLLOW UP ITEMS POST DISCHARGE  Follow up with sleep clinic   Follow up with PCP     DISCHARGE DIAGNOSES  Principal Problem:    Right heart failure with preserved right ventricular function (HCC) POA: Yes  Active Problems:    Obstructive sleep apnea POA: Yes    Morbid obesity (HCC) POA: Yes    SHARITA (acute kidney injury) (HCC) POA: Yes    Hypertension POA: Unknown    Respiratory failure (HCC) POA: Unknown  Resolved Problems:    Elevated d-dimer POA: Yes    Type 2 diabetes mellitus (HCC) POA: Unknown      FOLLOW UP  No future appointments.  [facility]  [address] [facility] [address]  [phone]  On 6/25/2021  Please sen @ 9am to recieve follow up care for your heart failure diagnosis. Thank you    [employer] [employer]  [address] [place] [address] [phone]  Today  to schedule delivery for the rest of O2 equipment      MEDICATIONS ON DISCHARGE     Medication List      START taking these medications      Instructions   carvedilol 6.25 MG Tabs  Commonly known as: COREG   Take 1 tablet by mouth 2 times a day with meals.  Dose: 6.25 mg        CHANGE how you take these medications      Instructions   furosemide 40 MG Tabs  Start taking on: June 24, 2021  What changed:   · medication strength  · how much to take  Commonly known as: LASIX   Take 1 tablet by mouth every day.  Dose: 40 mg        CONTINUE taking these medications      Instructions   amLODIPine 10 MG Tabs  Commonly known as: NORVASC   Take 10 mg by mouth every day.  Dose: 10 mg     fluticasone 50 MCG/ACT nasal spray  Commonly known as: FLONASE   Administer 2 Sprays into  affected nostril(S) every day.  Dose: 2 Spray     lisinopril-hydrochlorothiazide 20-25 MG per tablet  Commonly known as: PRINZIDE   Take 1 tablet by mouth every day.  Dose: 1 tablet     tiotropium 18 MCG Caps  Commonly known as: SPIRIVA   Place 18 mcg into inhaler and inhale every day.  Dose: 18 mcg            Allergies  No Known Allergies    DIET  No orders of the defined types were placed in this encounter.      ACTIVITY  As tolerated     CONSULTATIONS  None     PROCEDURES  None

## 2021-06-26 ENCOUNTER — NURSE TRIAGE (OUTPATIENT)
Dept: HEALTH INFORMATION MANAGEMENT | Facility: OTHER | Age: 46
End: 2021-06-26

## 2021-06-26 NOTE — TELEPHONE ENCOUNTER
"Pt wife calling to inform Nurse Advice line there is no note to be off work in the chart yet.    Reason for Disposition  • Information only question and nurse able to answer    Additional Information  • Negative: Nursing judgment  • Negative: Nursing judgment  • Negative: Nursing judgment  • Negative: Nursing judgment    Answer Assessment - Initial Assessment Questions  1. REASON FOR CALL or QUESTION: \"What is your reason for calling today?\" or \"How can I best help you?\" or \"What question do you have that I can help answer?\"      Pt wife calling to inform Nurse Advice line there is no note to be off work in the chart yet.    Protocols used: INFORMATION ONLY CALL - NO TRIAGE-A-[place]      "

## 2021-06-26 NOTE — TELEPHONE ENCOUNTER
"Messaged provider to provide note for patient for work.     Reason for Disposition  • Information only question and nurse able to answer    Additional Information  • Negative: Nursing judgment  • Negative: Nursing judgment  • Negative: Nursing judgment  • Negative: Nursing judgment    Answer Assessment - Initial Assessment Questions  1. REASON FOR CALL or QUESTION: \"What is your reason for calling today?\" or \"How can I best help you?\" or \"What question do you have that I can help answer?\"      Needs work note    Protocols used: INFORMATION ONLY CALL - NO TRIAGE-A-[place]      "

## 2021-06-26 NOTE — PROGRESS NOTES
To whom it may concern,    Shelli Moreira was under my care for his medical condition from 6/18/21 to 6/23/21. I anticipate his progressive return to previous activity and ability to carry out prior duties by 7/7, when I will reevaluate his health. If possible, please allow for distance work activites. I anticipated he will require oxygen to perform some duties for a period. Please allow accomodation for his ongoing treatment.    Thank you for your consideration.    Jonah Turpin MD

## 2021-07-09 ENCOUNTER — OFFICE VISIT (OUTPATIENT)
Dept: CARDIOLOGY | Facility: MEDICAL CENTER | Age: 46
End: 2021-07-09
Payer: COMMERCIAL

## 2021-07-09 VITALS
RESPIRATION RATE: 15 BRPM | HEIGHT: 64 IN | BODY MASS INDEX: 53.78 KG/M2 | HEART RATE: 90 BPM | OXYGEN SATURATION: 99 % | DIASTOLIC BLOOD PRESSURE: 80 MMHG | SYSTOLIC BLOOD PRESSURE: 108 MMHG | WEIGHT: 315 LBS

## 2021-07-09 DIAGNOSIS — I10 ESSENTIAL HYPERTENSION: ICD-10-CM

## 2021-07-09 DIAGNOSIS — E66.01 MORBID OBESITY (HCC): ICD-10-CM

## 2021-07-09 DIAGNOSIS — G47.33 OBSTRUCTIVE SLEEP APNEA: ICD-10-CM

## 2021-07-09 DIAGNOSIS — N17.9 AKI (ACUTE KIDNEY INJURY) (HCC): ICD-10-CM

## 2021-07-09 DIAGNOSIS — Z79.899 HIGH RISK MEDICATION USE: ICD-10-CM

## 2021-07-09 DIAGNOSIS — I50.810 RIGHT HEART FAILURE WITH PRESERVED RIGHT VENTRICULAR FUNCTION (HCC): ICD-10-CM

## 2021-07-09 PROCEDURE — 99214 OFFICE O/P EST MOD 30 MIN: CPT | Performed by: NURSE PRACTITIONER

## 2021-07-09 ASSESSMENT — ENCOUNTER SYMPTOMS
PND: 0
MYALGIAS: 0
ABDOMINAL PAIN: 0
SHORTNESS OF BREATH: 1
COUGH: 0
PALPITATIONS: 0
CLAUDICATION: 0
FEVER: 0
ORTHOPNEA: 0
DIZZINESS: 0

## 2021-07-09 ASSESSMENT — MINNESOTA LIVING WITH HEART FAILURE QUESTIONNAIRE (MLHF)
DIFFICULTY GOING AWAY FROM HOME: 5
WALKING ABOUT OR CLIMBING STAIRS DIFFICULT: 5
DIFFICULTY TO CONCENTRATE OR REMEMBERING THINGS: 5
TOTAL_SCORE: 81
MAKING YOU STAY IN A HOSPITAL: 5
DIFFICULTY WITH RECREATIONAL PASTIMES, SPORTS, HOBBIES: 5
DIFFICULTY SOCIALIZING WITH FAMILY OR FRIENDS: 5
MAKING YOU SHORT OF BREATH: 5
DIFFICULTY WITH SEXUAL ACTIVITIES: 5
MAKING YOU FEEL DEPRESSED: 0
HAVING TO SIT OR LIE DOWN DURING THE DAY: 5
COSTING YOU MONEY FOR MEDICAL CARE: 5
GIVING YOU SIDE EFFECTS FROM TREATMENTS: 0
DIFFICULTY SLEEPING WELL AT NIGHT: 5
FEELING LIKE A BURDEN TO FAMILY AND FRIENDS: 0
MAKING YOU WORRY: 1
WORKING AROUND THE HOUSE OR YARD DIFFICULT: 5
TIRED, FATIGUED OR LOW ON ENERGY: 5
EATING LESS FOODS YOU LIKE: 5
DIFFICULTY WORKING TO EARN A LIVING: 5
LOSS OF SELF CONTROL IN YOUR LIFE: 0
SWELLING IN ANKLES OR LEGS: 5

## 2021-07-09 ASSESSMENT — FIBROSIS 4 INDEX: FIB4 SCORE: 0.89

## 2021-07-09 NOTE — PROGRESS NOTES
Chief Complaint   Patient presents with   • Congestive Heart Failure       Subjective:   Shelli Moreira is a 45 y.o. male who presents today for follow-up on his heart failure with his wife, Dorothy.    New patient to the office.  He had a recent hospitalization from 6/18/2021 through 6/23/2021.  Patient had presented with shortness of breath and weight gain.  He was a transfer from [facility].  Patient was found to have acute hypoxemic respiratory failure.  An echo was performed and patient was found to have right-sided heart failure.  Patient has a history of sleep apnea and obesity.  Likely his CPAP is not as effective.  Patient diuresed and discharged home.  Patient recommended to follow-up for repeat sleep study.    Patient reports today he has been doing better.  He does continue to shortness of breath, using oxygen at 3 L continuously.  He denies chest pain, palpitations, orthopnea, PND, edema or dizziness/lightheadedness.    He reports his home weights have been decreasing.  Prior to his admission, he was weighing 390 pounds, he is now down to 354 pounds at home.    He is watching his sodium intake.    Additonally, patient has the following medical problems:    -Hypertension    -Sleep apnea    Past Medical History:   Diagnosis Date   • Asthma    • Congestive heart failure (HCC)    • Hypertension    • Sleep apnea      Past Surgical History:   Procedure Laterality Date   • KNEE ARTHROSCOPY Left 2009     Family History   Problem Relation Age of Onset   • Diabetes Mother    • No Known Problems Sister      Social History     Socioeconomic History   • Marital status:      Spouse name: Not on file   • Number of children: Not on file   • Years of education: Not on file   • Highest education level: Not on file   Occupational History   • Not on file   Tobacco Use   • Smoking status: Never Smoker   • Smokeless tobacco: Never Used   Vaping Use   • Vaping Use: Every day   • Substances: THC   •  Devices: Disposable   Substance and Sexual Activity   • Alcohol use: Yes     Comment: rare    • Drug use: Yes     Types: Inhaled     Comment: THC   • Sexual activity: Not on file   Other Topics Concern   • Not on file   Social History Narrative   • Not on file     Social Determinants of Health     Financial Resource Strain:    • Difficulty of Paying Living Expenses:    Food Insecurity:    • Worried About Running Out of Food in the Last Year:    • Ran Out of Food in the Last Year:    Transportation Needs:    • Lack of Transportation (Medical):    • Lack of Transportation (Non-Medical):    Physical Activity:    • Days of Exercise per Week:    • Minutes of Exercise per Session:    Stress:    • Feeling of Stress :    Social Connections:    • Frequency of Communication with Friends and Family:    • Frequency of Social Gatherings with Friends and Family:    • Attends Islam Services:    • Active Member of Clubs or Organizations:    • Attends Club or Organization Meetings:    • Marital Status:    Intimate Partner Violence:    • Fear of Current or Ex-Partner:    • Emotionally Abused:    • Physically Abused:    • Sexually Abused:      No Known Allergies  Outpatient Encounter Medications as of 7/9/2021   Medication Sig Dispense Refill   • furosemide (LASIX) 40 MG Tab Take 1 tablet by mouth every day. 60 tablet 3   • carvedilol (COREG) 6.25 MG Tab Take 1 tablet by mouth 2 times a day with meals. 60 tablet 3   • fluticasone (FLONASE) 50 MCG/ACT nasal spray Administer 2 Sprays into affected nostril(S) every day.     • tiotropium (SPIRIVA) 18 MCG Cap Place 18 mcg into inhaler and inhale every day.     • lisinopril-hydrochlorothiazide (PRINZIDE) 20-25 MG per tablet Take 1 tablet by mouth every day.     • amLODIPine (NORVASC) 10 MG Tab Take 10 mg by mouth every day.       No facility-administered encounter medications on file as of 7/9/2021.     Review of Systems   Constitutional: Negative for fever and malaise/fatigue.  "  Respiratory: Positive for shortness of breath. Negative for cough.    Cardiovascular: Negative for chest pain, palpitations, orthopnea, claudication, leg swelling and PND.   Gastrointestinal: Negative for abdominal pain.   Musculoskeletal: Negative for myalgias.   Neurological: Negative for dizziness.   All other systems reviewed and are negative.       Objective:   /80 (BP Location: Left arm, Patient Position: Sitting, BP Cuff Size: Adult)   Pulse 90   Resp 15   Ht 1.626 m (5' 4\")   Wt (!) 161 kg (355 lb)   SpO2 99%   BMI 60.94 kg/m²     Physical Exam   Constitutional: He is oriented to person, place, and time. He appears well-developed.   BMI 60.94   HENT:   Head: Normocephalic and atraumatic.   Eyes: Pupils are equal, round, and reactive to light.   Neck: No JVD present.   Cardiovascular: Normal rate, regular rhythm and normal heart sounds.   Pulmonary/Chest: Effort normal. No respiratory distress. He has decreased breath sounds in the right lower field and the left lower field. He has no wheezes. He has no rales.   Abdominal: Soft. Bowel sounds are normal.   Musculoskeletal:      Cervical back: Normal range of motion and neck supple.      Right lower leg: Edema (1+) present.      Left lower leg: Edema (1+) present.   Neurological: He is alert and oriented to person, place, and time.   Skin: Skin is warm and dry.   Psychiatric: His behavior is normal.   Vitals reviewed.    Lab Results   Component Value Date/Time    CHOLSTRLTOT 110 06/19/2021 12:45 AM    LDL 51 06/19/2021 12:45 AM    HDL 44 06/19/2021 12:45 AM    TRIGLYCERIDE 73 06/19/2021 12:45 AM       Lab Results   Component Value Date/Time    SODIUM 138 06/21/2021 12:13 AM    POTASSIUM 3.7 06/21/2021 12:13 AM    CHLORIDE 93 (L) 06/21/2021 12:13 AM    CO2 38 (H) 06/21/2021 12:13 AM    GLUCOSE 107 (H) 06/21/2021 12:13 AM    BUN 18 06/21/2021 12:13 AM    CREATININE 1.11 06/21/2021 12:13 AM     Lab Results   Component Value Date/Time    ALKPHOSPHAT " 63 06/20/2021 12:04 AM    ASTSGOT 22 06/20/2021 12:04 AM    ALTSGPT 24 06/20/2021 12:04 AM    TBILIRUBIN 0.8 06/20/2021 12:04 AM      Transthoracic Echo Report 6/18/2021  No prior study is available for comparison.   Normal left ventricular systolic function.   No evidence of valvular abnormality based on Doppler evaluation.   Severely dilated right ventricle. Normal right ventricular systolic   function.  Unable to estimate pulmonary artery pressure due to an inadequate   tricuspid regurgitant jet    Assessment:     1. Right heart failure with preserved right ventricular function (HCC)  Basic Metabolic Panel   2. Obstructive sleep apnea  Basic Metabolic Panel   3. Morbid obesity (HCC)  Basic Metabolic Panel   4. SHARITA (acute kidney injury) (HCC)  Basic Metabolic Panel   5. Essential hypertension  Basic Metabolic Panel   6. High risk medication use  Basic Metabolic Panel       Medical Decision Making:  Today's Assessment / Status / Plan:   1.  Right-sided heart failure versus, obesity hypoventilation syndrome:  -Continue furosemide 40 mg daily  -BMP this week  -Reinforced s/sx of worsening symptoms with patient and weight monitoring. Pt verbalizes understanding. Pt to call office or RTC if present.   -Continue low-sodium diet    2.  Sleep apnea:  -Patient referred to sleep medicine by PCP for reevaluation  -Continue CPAP use for now    3.  Obesity: BMI 60.94  -Encourage weight loss    4.  Hypertension: Stable  -Continue amlodipine 10 mg daily  -Continue carvedilol 6.25 mg twice a day  -Continue lisinopril-hydrochlorothiazide 20-25 mg daily    5.  Respiratory failure:  -Continue oxygen at 3 L    FU in clinic in 5 weeks with labs in 1 week. Sooner if needed.    Patient verbalizes understanding and agrees with the plan of care.     PLEASE NOTE: This Note was created using voice recognition Software. I have made every reasonable attempt to correct obvious errors, but I expect that there are errors of grammar and possibly  content that I did not discover before finalizing the note

## 2021-07-16 ENCOUNTER — TELEPHONE (OUTPATIENT)
Dept: CARDIOLOGY | Facility: MEDICAL CENTER | Age: 46
End: 2021-07-16

## 2021-07-28 LAB — EKG IMPRESSION: NORMAL

## 2021-08-11 ENCOUNTER — TELEPHONE (OUTPATIENT)
Dept: CARDIOLOGY | Facility: MEDICAL CENTER | Age: 46
End: 2021-08-11

## 2021-08-11 NOTE — TELEPHONE ENCOUNTER
Spoke with patient in regards of his non fasting labs patient stated that he had them done at [facility] labs are in media under date 07/16/2021. Confirmed appointment with patient time and date.

## 2021-08-16 ENCOUNTER — OFFICE VISIT (OUTPATIENT)
Dept: CARDIOLOGY | Facility: MEDICAL CENTER | Age: 46
End: 2021-08-16
Payer: COMMERCIAL

## 2021-08-16 VITALS
OXYGEN SATURATION: 87 % | HEIGHT: 64 IN | DIASTOLIC BLOOD PRESSURE: 90 MMHG | WEIGHT: 315 LBS | HEART RATE: 100 BPM | BODY MASS INDEX: 53.78 KG/M2 | RESPIRATION RATE: 16 BRPM | SYSTOLIC BLOOD PRESSURE: 140 MMHG

## 2021-08-16 DIAGNOSIS — I10 ESSENTIAL HYPERTENSION: ICD-10-CM

## 2021-08-16 DIAGNOSIS — G47.33 OBSTRUCTIVE SLEEP APNEA: ICD-10-CM

## 2021-08-16 DIAGNOSIS — N17.9 AKI (ACUTE KIDNEY INJURY) (HCC): ICD-10-CM

## 2021-08-16 DIAGNOSIS — Z79.899 HIGH RISK MEDICATION USE: ICD-10-CM

## 2021-08-16 DIAGNOSIS — I50.810 RIGHT HEART FAILURE WITH PRESERVED RIGHT VENTRICULAR FUNCTION (HCC): ICD-10-CM

## 2021-08-16 DIAGNOSIS — E66.01 MORBID OBESITY (HCC): ICD-10-CM

## 2021-08-16 PROCEDURE — 99214 OFFICE O/P EST MOD 30 MIN: CPT | Performed by: NURSE PRACTITIONER

## 2021-08-16 RX ORDER — CARVEDILOL 12.5 MG/1
12.5 TABLET ORAL 2 TIMES DAILY WITH MEALS
Qty: 60 TABLET | Refills: 11 | Status: SHIPPED | OUTPATIENT
Start: 2021-08-16 | End: 2022-04-25 | Stop reason: SDUPTHER

## 2021-08-16 ASSESSMENT — ENCOUNTER SYMPTOMS
PND: 0
FEVER: 0
CLAUDICATION: 0
COUGH: 0
SHORTNESS OF BREATH: 1
DIZZINESS: 1
ORTHOPNEA: 0
PALPITATIONS: 0
MYALGIAS: 0
ABDOMINAL PAIN: 0

## 2021-08-16 ASSESSMENT — FIBROSIS 4 INDEX: FIB4 SCORE: 0.89

## 2021-08-16 NOTE — PROGRESS NOTES
Chief Complaint   Patient presents with   • Congestive Heart Failure       Subjective:   Shelli Moreira is a 45 y.o. male who presents today for follow-up on his heart failure.    Current patient of the Heart failure clinic.  He was last seen in clinic on 7/9/2021. During that visit, no changes were made to his regimen, patient was recommended to get follow-up lab testing.    Patient is here in the clinic to follow-up.  Since his last visit, he has been doing fair.  He states he was evacuated from his home in [place] and has been staying in [place].  He reports his diet has not been great because he has been out of his routine.    He did see his primary care provider last week and was recommended to increase his furosemide dose to twice a day for 3 days.  He reports some improvement of his lower extremity edema and abdominal bloating.    He does continue to have his lower extremity edema, abdominal bloating, shortness of breath, dizziness with some exertion and fatigue.  He denies chest pain, palpitations, orthopnea or PND.    He has not been weighing himself because he does not have a scale.    He has been using his CPAP, but his oxygen was left behind.    He does have an appointment with sleep medicine on the 30th of this month.    Additonally, patient has the following medical problems:    -hospitalization from 6/18/2021 through 6/23/2021.  Patient had presented with shortness of breath and weight gain.  He was a transfer from [facility].  Patient was found to have acute hypoxemic respiratory failure.  An echo was performed and patient was found to have right-sided heart failure.  Patient has a history of sleep apnea and obesity.  Likely his CPAP is not as effective.  Patient diuresed and discharged home.  Patient recommended to follow-up for repeat sleep study    -Hypertension    -Sleep apnea    Past Medical History:   Diagnosis Date   • Asthma    • Congestive heart failure (HCC)    •  Hypertension    • Sleep apnea      Past Surgical History:   Procedure Laterality Date   • KNEE ARTHROSCOPY Left 2009     Family History   Problem Relation Age of Onset   • Diabetes Mother    • No Known Problems Sister      Social History     Socioeconomic History   • Marital status:      Spouse name: Not on file   • Number of children: Not on file   • Years of education: Not on file   • Highest education level: Not on file   Occupational History   • Not on file   Tobacco Use   • Smoking status: Never Smoker   • Smokeless tobacco: Never Used   Vaping Use   • Vaping Use: Every day   • Substances: THC   • Devices: Disposable   Substance and Sexual Activity   • Alcohol use: Yes     Comment: rare    • Drug use: Yes     Types: Inhaled     Comment: THC   • Sexual activity: Not on file   Other Topics Concern   • Not on file   Social History Narrative   • Not on file     Social Determinants of Health     Financial Resource Strain:    • Difficulty of Paying Living Expenses:    Food Insecurity:    • Worried About Running Out of Food in the Last Year:    • Ran Out of Food in the Last Year:    Transportation Needs:    • Lack of Transportation (Medical):    • Lack of Transportation (Non-Medical):    Physical Activity:    • Days of Exercise per Week:    • Minutes of Exercise per Session:    Stress:    • Feeling of Stress :    Social Connections:    • Frequency of Communication with Friends and Family:    • Frequency of Social Gatherings with Friends and Family:    • Attends Hoahaoism Services:    • Active Member of Clubs or Organizations:    • Attends Club or Organization Meetings:    • Marital Status:    Intimate Partner Violence:    • Fear of Current or Ex-Partner:    • Emotionally Abused:    • Physically Abused:    • Sexually Abused:      No Known Allergies  Outpatient Encounter Medications as of 8/16/2021   Medication Sig Dispense Refill   • carvedilol (COREG) 12.5 MG Tab Take 1 Tablet by mouth 2 times a day with meals.  "60 Tablet 11   • furosemide (LASIX) 40 MG Tab Take 1 tablet by mouth every day. 60 tablet 3   • fluticasone (FLONASE) 50 MCG/ACT nasal spray Administer 2 Sprays into affected nostril(S) every day.     • tiotropium (SPIRIVA) 18 MCG Cap Place 18 mcg into inhaler and inhale every day.     • lisinopril-hydrochlorothiazide (PRINZIDE) 20-25 MG per tablet Take 1 tablet by mouth every day.     • amLODIPine (NORVASC) 10 MG Tab Take 10 mg by mouth every day.     • [DISCONTINUED] carvedilol (COREG) 6.25 MG Tab Take 1 tablet by mouth 2 times a day with meals. 60 tablet 3     No facility-administered encounter medications on file as of 8/16/2021.     Review of Systems   Constitutional: Positive for malaise/fatigue. Negative for fever.   Respiratory: Positive for shortness of breath. Negative for cough.    Cardiovascular: Positive for leg swelling. Negative for chest pain, palpitations, orthopnea, claudication and PND.   Gastrointestinal: Negative for abdominal pain.   Musculoskeletal: Negative for myalgias.   Neurological: Positive for dizziness (occ with some exertion).   All other systems reviewed and are negative.       Objective:   /90 (BP Location: Left arm, Patient Position: Sitting, BP Cuff Size: Adult)   Pulse 100   Resp 16   Ht 1.626 m (5' 4\")   Wt (!) 161 kg (356 lb)   SpO2 (!) 87%   BMI 61.11 kg/m²     Physical Exam   Constitutional: He is oriented to person, place, and time. He appears well-developed.   BMI 61.11   HENT:   Head: Normocephalic and atraumatic.   Eyes: Pupils are equal, round, and reactive to light.   Neck: No JVD present.   Cardiovascular: Normal rate, regular rhythm and normal heart sounds.   Pulmonary/Chest: Effort normal. No respiratory distress. He has decreased breath sounds in the right lower field and the left lower field. He has no wheezes. He has no rales.   Abdominal: Soft. Bowel sounds are normal.   Musculoskeletal:      Cervical back: Normal range of motion and neck supple.      " Right lower leg: Edema (1+) present.      Left lower leg: Edema (1+) present.   Neurological: He is alert and oriented to person, place, and time.   Skin: Skin is warm and dry.   Psychiatric: His behavior is normal.   Vitals reviewed.    Lab Results   Component Value Date/Time    CHOLSTRLTOT 110 06/19/2021 12:45 AM    LDL 51 06/19/2021 12:45 AM    HDL 44 06/19/2021 12:45 AM    TRIGLYCERIDE 73 06/19/2021 12:45 AM       Lab Results   Component Value Date/Time    SODIUM 138 06/21/2021 12:13 AM    POTASSIUM 3.7 06/21/2021 12:13 AM    CHLORIDE 93 (L) 06/21/2021 12:13 AM    CO2 38 (H) 06/21/2021 12:13 AM    GLUCOSE 107 (H) 06/21/2021 12:13 AM    BUN 18 06/21/2021 12:13 AM    CREATININE 1.11 06/21/2021 12:13 AM     Lab Results   Component Value Date/Time    ALKPHOSPHAT 63 06/20/2021 12:04 AM    ASTSGOT 22 06/20/2021 12:04 AM    ALTSGPT 24 06/20/2021 12:04 AM    TBILIRUBIN 0.8 06/20/2021 12:04 AM      Transthoracic Echo Report 6/18/2021  No prior study is available for comparison.   Normal left ventricular systolic function.   No evidence of valvular abnormality based on Doppler evaluation.   Severely dilated right ventricle. Normal right ventricular systolic   function.  Unable to estimate pulmonary artery pressure due to an inadequate   tricuspid regurgitant jet    Assessment:     1. Right heart failure with preserved right ventricular function (HCC)  Basic Metabolic Panel   2. Obstructive sleep apnea  Basic Metabolic Panel   3. SHARITA (acute kidney injury) ([facility])  Basic Metabolic Panel   4. High risk medication use  Basic Metabolic Panel   5. Morbid obesity ([facility])     6. Essential hypertension         Medical Decision Making:  Today's Assessment / Status / Plan:   1.  Right-sided heart failure versus, obesity hypoventilation syndrome:  -Increase furosemide to 40 mg twice a day for 1 week  -BMP in 1 week if possible  -Reinforced s/sx of worsening symptoms with patient and weight monitoring. Pt verbalizes understanding. Pt to  call office or RTC if present.   -Continue low-sodium diet when able    2.  Sleep apnea:  -Patient referred to sleep medicine by PCP for reevaluation  -Continue CPAP use for now  -Patient does have follow-up with sleep medicine on 8/30/2021    3.  Obesity: BMI 61.11  -Encourage weight loss    4.  Hypertension: Stable  -Continue amlodipine 10 mg daily  -Increase carvedilol to 12.5 mg twice a day  -Continue lisinopril-hydrochlorothiazide 20-25 mg daily    5.  Respiratory failure:  -Continue oxygen at 3 L when able    FU in clinic in 2 weeks with labs in 1 week. Sooner if needed.    Patient verbalizes understanding and agrees with the plan of care.     PLEASE NOTE: This Note was created using voice recognition Software. I have made every reasonable attempt to correct obvious errors, but I expect that there are errors of grammar and possibly content that I did not discover before finalizing the note

## 2021-08-16 NOTE — PATIENT INSTRUCTIONS
Increase Carvedilol to 12.5 mg Twice a day. Can take 2 tabs of 6.25 mg twice a day until prescription finished.    Increase furosemide to 40 mg Twice a day for 1 week    Labs in 1 week if possible

## 2021-08-30 ENCOUNTER — SLEEP CENTER VISIT (OUTPATIENT)
Dept: SLEEP MEDICINE | Facility: MEDICAL CENTER | Age: 46
End: 2021-08-30
Payer: COMMERCIAL

## 2021-08-30 VITALS
DIASTOLIC BLOOD PRESSURE: 80 MMHG | OXYGEN SATURATION: 84 % | HEIGHT: 64 IN | HEART RATE: 96 BPM | SYSTOLIC BLOOD PRESSURE: 122 MMHG | BODY MASS INDEX: 53.78 KG/M2 | WEIGHT: 315 LBS | RESPIRATION RATE: 16 BRPM

## 2021-08-30 DIAGNOSIS — G47.33 OSA (OBSTRUCTIVE SLEEP APNEA): ICD-10-CM

## 2021-08-30 DIAGNOSIS — I50.810 RIGHT-SIDED CONGESTIVE HEART FAILURE, UNSPECIFIED HF CHRONICITY (HCC): ICD-10-CM

## 2021-08-30 DIAGNOSIS — I10 ESSENTIAL HYPERTENSION: ICD-10-CM

## 2021-08-30 DIAGNOSIS — J45.909 UNCOMPLICATED ASTHMA, UNSPECIFIED ASTHMA SEVERITY, UNSPECIFIED WHETHER PERSISTENT: ICD-10-CM

## 2021-08-30 PROCEDURE — 99204 OFFICE O/P NEW MOD 45 MIN: CPT | Performed by: PREVENTIVE MEDICINE

## 2021-08-30 RX ORDER — ZOLPIDEM TARTRATE 5 MG/1
2.5 TABLET ORAL NIGHTLY PRN
Qty: 1 TABLET | Refills: 0 | Status: SHIPPED | OUTPATIENT
Start: 2021-08-30 | End: 2021-11-24

## 2021-08-30 ASSESSMENT — FIBROSIS 4 INDEX: FIB4 SCORE: 0.89

## 2021-08-30 NOTE — PROGRESS NOTES
"  CC: \"I was hospitalized for congestive heart failure in June of this year.  I was on BiPAP in the past but now I only have access to a CPAP.  He does work very well.\"    HPI:  Shelli Moreira is a 45 y.o. male kindly referred by Jonah Turpin M.D.This patient has a past medical history of essential hypertension, morbid obesity (BMI 61.28), acute kidney injury, asthma, recent respiratory failure, and right heart failure with preserved right ventricular function.  He was previously studied using a home sleep study approximately 20 years ago and he was started on BiPAP.  However he has BiPAP machine stopped working and he purchased a CPAP machine.  He attempted using the CPAP machine until he was hospitalized in June of this year when he was told that it was completely inadequate to address his sleep apnea.    Sleep history this patient reports sleep apnea and loud snoring as well as poor quality sleep.  He reports that he has high blood pressure sinus problems and allergies.  He has recently been diagnosed with congestive heart failure and respiratory failure.  He was to bed at 930 and falls asleep within 10 minutes he wakes up at 5 and he generally gets up 3 times at night to urinate.  He does not feel refreshed and he does not take naps.  He does read in bed he does not watch TV in bed.  He does not share a bed with his wife.  He does have a pet in the bedroom.  This patient is a former smoker.  He would smoke a few cigarettes in his early 20s daily in social settings.  He stopped smoking 20 years ago.  He does not drink alcoholic beverages and he does not drink caffeinated beverages.  He works Monday through Friday 8-4 as a .  He reports that he has trouble breathing through his nose and difficulty breathing while lying flat on his back.  His [place] score 16 out of 24.  This is consistent with his excessive daytime sleepiness        Symptom Summary:  Snoring: +  Very loud snoring: " +  Witnessed apneas: +  Resuscitative snorts: +  Nocturnal shortness of breath: +  Non-restorative sleep: +  EPWORTH SCORE:   16   /24, this is consistent with excessive daytime sleepiness  Insomnia: -  Nocturnal awakenings: +  Nocturia: +  Fatigue: +  Tiredness: +  Falls asleep accidentally: +   Napping or returning to bed after arising: -  Restless legs: -  Limb movements during sleep:  Nocturnal headaches: -  Morning Headaches: +    Significant comorbidities and modifying factors include: See HPI      Patient Active Problem List    Diagnosis Date Noted   • Respiratory failure ([facility]) 06/21/2021   • Right heart failure with preserved right ventricular function ([facility]) 06/19/2021   • Obstructive sleep apnea 06/19/2021   • Morbid obesity ([facility]) 06/19/2021   • SHARITA (acute kidney injury) ([facility]) 06/19/2021   • Hypertension 06/19/2021   • Carpal tunnel syndrome 12/07/2005       Past Medical History:   Diagnosis Date   • Asthma    • Congestive heart failure ([facility])    • Hypertension    • Sleep apnea         Past Surgical History:   Procedure Laterality Date   • KNEE ARTHROSCOPY Left 2009       Family History   Problem Relation Age of Onset   • Diabetes Mother    • No Known Problems Sister        Social History     Socioeconomic History   • Marital status:      Spouse name: Not on file   • Number of children: Not on file   • Years of education: Not on file   • Highest education level: Not on file   Occupational History   • Not on file   Tobacco Use   • Smoking status: Never Smoker   • Smokeless tobacco: Never Used   Vaping Use   • Vaping Use: Every day   • Substances: THC   • Devices: Disposable   Substance and Sexual Activity   • Alcohol use: Yes     Comment: rare    • Drug use: Yes     Types: Inhaled     Comment: THC   • Sexual activity: Not on file   Other Topics Concern   • Not on file   Social History Narrative   • Not on file     Social Determinants of Health     Financial Resource Strain:    • Difficulty of Paying  "Living Expenses:    Food Insecurity:    • Worried About Running Out of Food in the Last Year:    • Ran Out of Food in the Last Year:    Transportation Needs:    • Lack of Transportation (Medical):    • Lack of Transportation (Non-Medical):    Physical Activity:    • Days of Exercise per Week:    • Minutes of Exercise per Session:    Stress:    • Feeling of Stress :    Social Connections:    • Frequency of Communication with Friends and Family:    • Frequency of Social Gatherings with Friends and Family:    • Attends [employer] Services:    • Active Member of Clubs or Organizations:    • Attends Club or Organization Meetings:    • Marital Status:    Intimate Partner Violence:    • Fear of Current or Ex-Partner:    • Emotionally Abused:    • Physically Abused:    • Sexually Abused:            ALLERGIES: Patient has no known allergies.    ROS    Constitutional: Denies weight loss, endorses severe fatigue.  Eyes: Denies vision loss,    glasses.  Ears/Nose/Mouth/Throat: Endorses significant rhinitis/nasal congestion,   Cardiovascular: Endorses intermittent, swelling in legs/feet, difficulty breathing when lying down but gets better when sitting up.   Respiratory: Denies shortness of breath while awake,  Sleep: per HPI  Gastrointestinal: Denies  difficulty swallowing,  heartburn.  Genitourinary: REPORTS nocturia  Musculoskeletal: Denies painful joints, sore muscles, back pain.   Neurological: Endorses frequent headaches    PHYSICAL EXAM  22.5 inches  = neck circumference:  /80 (BP Location: Left arm, Patient Position: Sitting, BP Cuff Size: Large adult) Comment (BP Location): forearm  Pulse 96   Resp 16   Ht 1.626 m (5' 4\")   Wt (!) 162 kg (357 lb)   SpO2 (!) 84%   BMI 61.28 kg/m²   Appearance: Obese, well-developed,  looks stated age, no acute distress  Eyes:   EOMI  ENMT: Mallampati 4  Neck:  trachea midline  Respiratory effort:  No intercostal retractions or use of accessory muscles  Lung auscultation: " Inspiratory wheezes bilateral bases  Cardiac: Distant heart sounds regular rhythm, normal rate;  Musculoskeletal: White; gait and station normal  Neurologic:  oriented to person, time, place, and purpose; judgement intact  Psychiatric:  No depression, anxiety, agitation        Medical Decision Making:  The medical record was reviewed in its as pertains to this referral. This includes records from primary care, consultants notes,  referral request, hospital records, labs, imaging.    Any available diagnostic and titration nocturnal polysomnograms, home sleep apnea tests, continuous nocturnal oximetry results, multiple sleep latency tests, and compliance reports were reviewed with the patient.    Assessment:    1. BRANDEN (obstructive sleep apnea)  - zolpidem (AMBIEN) 5 MG Tab; Take 0.5 Tablets by mouth at bedtime as needed for Sleep for up to 2 doses.  Dispense: 1 Tablet; Refill: 0  - Polysomnography, 4 or More; Future    2. Uncomplicated asthma, unspecified asthma severity, unspecified whether persistent  - zolpidem (AMBIEN) 5 MG Tab; Take 0.5 Tablets by mouth at bedtime as needed for Sleep for up to 2 doses.  Dispense: 1 Tablet; Refill: 0  - Polysomnography, 4 or More; Future    3. Essential hypertension  - zolpidem (AMBIEN) 5 MG Tab; Take 0.5 Tablets by mouth at bedtime as needed for Sleep for up to 2 doses.  Dispense: 1 Tablet; Refill: 0  - Polysomnography, 4 or More; Future    4. Right-sided congestive heart failure, unspecified HF chronicity (HCC)  - zolpidem (AMBIEN) 5 MG Tab; Take 0.5 Tablets by mouth at bedtime as needed for Sleep for up to 2 doses.  Dispense: 1 Tablet; Refill: 0  - Polysomnography, 4 or More; Future      PLAN:   The patient has signs and symptoms consistent with obstructive sleep apnea hypopnea syndrome. Will schedule a nocturnal polysomnogram.  This patient has significant comorbid conditions including right-sided congestive heart failure and respiratory failure.  We will split early at  greater than 5/h and the patient will use 2.5 mg of Ambien (2 doses available) on the night of the study.  ET CO2 is to be collected/monitored during the diagnostic portion of the study.      The risks of untreated sleep apnea were discussed with the patient at length. Patients with BRANDEN are at increased risk of cardiovascular disease including coronary artery disease, systemic arterial hypertension, pulmonary arterial hypertension, cardiac arrhythmias, and stroke. BRANDEN patients have an increased risk of motor vehicle accidents, type 2 diabetes, chronic kidney disease, and non-alcoholic liver disease. The patient was advised to avoid driving a motor vehicle when drowsy.        Have advised the patient to follow up with the appropriate healthcare practitioners for all other medical problems and issues.            RTC 1-2 weeks after study

## 2021-09-11 ENCOUNTER — SLEEP STUDY (OUTPATIENT)
Dept: SLEEP MEDICINE | Facility: MEDICAL CENTER | Age: 46
End: 2021-09-11
Attending: PREVENTIVE MEDICINE
Payer: COMMERCIAL

## 2021-09-11 DIAGNOSIS — I50.810 RIGHT-SIDED CONGESTIVE HEART FAILURE, UNSPECIFIED HF CHRONICITY (HCC): ICD-10-CM

## 2021-09-11 DIAGNOSIS — J45.909 UNCOMPLICATED ASTHMA, UNSPECIFIED ASTHMA SEVERITY, UNSPECIFIED WHETHER PERSISTENT: ICD-10-CM

## 2021-09-11 DIAGNOSIS — G47.33 OSA (OBSTRUCTIVE SLEEP APNEA): ICD-10-CM

## 2021-09-11 DIAGNOSIS — I10 ESSENTIAL HYPERTENSION: ICD-10-CM

## 2021-09-13 NOTE — PROCEDURES
RECORDING TECHNIQUE:   After the scalp was prepared, gold plated electrodes were applied to the scalp according to the International 10-20 System. EEG (electroencephalogram) was continuously monitored from the O1-M2, O2-M1, C3-M2, C4-M1, F3-M2, and F4-M1. EOGs (electrooculograms) were monitored by electrodes placed at the left and right outer canthi. Chin EMG (electromyogram) was monitored by electrodes placed on the mentalis and sub-mentalis muscles. Nasal and oral airflow were monitored using a triple port thermocouple as well as oronasal pressure transducer. Respiratory effort was measured by inductive plethysmography technology employing abdominal and thoracic belts. Blood oxygen saturation and pulse were monitored by pulse oximetry. Heart rhythm was monitored by surface electrocardiogram. Leg EMG was studied using surface electrodes placed on left and right anterior tibialis. A microphone was used to monitor tracheal sounds and snoring. Body position was monitored and documented by technician observation.   MONTAGE: Standard with end-tidal CO2 measurements  STUDY TYPE: Split Night  SCORING CRITERIA:   A modification of the AASM manual for scoring of sleep and associated events was used. Obstructive apneas were scored by cessation of airflow for at least 10 seconds with continuing respiratory effort. Central apneas were scored by cessation of airflow for at least 10 seconds with no respiratory effort. Hypopneas were scored by a 30% or more reduction in airflow for at least 10 seconds accompanied by arterial oxygen desaturation of 3% or an arousal. For CMS (Medicare) patients, per AASM rule 1B, hypopneas are scored by 30% with mild reduction in airflow for at least 10 seconds accompanied by arterial saturation decreased at 4%.  Study start time was 10:23:03 PM. Diagnostic recording time was 7h 56.5m with a total sleep time of 6h 0.0m resulting in a sleep efficiency of 75.55%%. Sleep latency from the start of the  study was 18 minutes and the latency from sleep to REM was 114 minutes. In total,423 arousals were scored for an arousal index of 70.5.  Respiratory:  There were a total of 444 apneas consisting of 229 obstructive apneas, 0 mixed apneas, and 215 central apneas. A total of 129 hypopneas were scored. The apnea index was 74.00 per hour and the hypopnea index was 21.50 per hour resulting in an overall AHI of 95.50. AHI during rem was 83.3 and AHI while supine was 100.33.  Oximetry:  There was a mean oxygen saturation of 80.0% with a minimum oxygen saturation of 51.0%. Time spent during sleep with oxygen saturations below 89% was 320.0 minutes. The patient spent 79.8 minutes of TST with SaO2 <88%.  Cardiac:  The highest heart rate seen while awake was 107 BPM while the highest heart rate during sleep was 102 BPM with an average sleeping heart rate of 81 BPM.  Limb Movements:  There were a total of 27 PLMs during sleep, of which 3 were PLMS arousals. This resulted in a PLMS index of 4.5 and a PLMS arousal index of 0.5.  Treatment:  Interpretation:  Treatment recording time was 3h 51.0m (231 minutes) with a total sleep time of 3h 27.5m (207 minutes) resulting in a sleep efficiency of 89.8%. Sleep latency from the start of treatment was 00 minutes minutes and REM latency from sleep onset was 0h 14.5m minutes. The patient had 185 arousals in total for an arousal index of 53.5.  Respiratory:   There were 175 apneas in total consisting of 66 obstructive apneas, 109 central apneas, and 0 mixed apneas for an apnea index of 50.60. The patient had 125 hypopneas in total, which resulted in a hypopnea index of 36.14. The overall AHI was 86.75, with a REM AHI of 80.69, and a supine AHI of 97.01.   Limb Movements:  There were a total of 0 periodic leg movements, of which 0 were PLMS arousals. This resulted in a PLMS index of 0.0 and a PLMS arousal index of 0.0.  Oximetry:  The mean SaO2 during treatment was 83.0%, with a minimum  oxygen saturation of 51.0%. Patient spent 16.0 minutes of TST with SaO2 <88%.  Titration: CPAP was tried from 8 to 12cm H2O. BiPAP was tried from 13/9 to 25 /20cm H2O  This was a fully attended sleep study. This test was technically adequate. This patient was titrated on CPAP starting at 8 cm of water pressure. Patient was titrated up to 12 cm of water pressure.  The apnea-hypopnea index failed to normalize on any tested CPAP pressure.  The technician then placed the patient on bilevel with IPAP's ranging from 13-25 cm water and EPAP's ranging from 9-18 cm water.  The apnea-hypopnea index failed to normalize on any tested BiPAP pressure either.    Assessment:     DIAGNOSTIC:  1.  Severe BRANDEN with an AHI of 107.4  2.  Hypoventilation with end-tidal CO2 as high as 71 mmHg  3.  Severe nocturnal hypoxemia: The nikki saturation was 57% and saturations were less than 90% for 98.3% of the diagnostic recording    TREATMENT:   1.  Unsuccessful CPAP titration  2.  Unsuccessful BiPAP titration      Recommendation:   Unfortunately these results cannot be extrapolated to any meaningful positive airway pressure settings.  Recommend the patient return for a dedicated IVAPS titration.

## 2021-09-18 PROCEDURE — 95811 POLYSOM 6/>YRS CPAP 4/> PARM: CPT | Performed by: INTERNAL MEDICINE

## 2021-10-15 ENCOUNTER — TELEPHONE (OUTPATIENT)
Dept: CARDIOLOGY | Facility: MEDICAL CENTER | Age: 46
End: 2021-10-15

## 2021-10-15 DIAGNOSIS — G47.33 OBSTRUCTIVE SLEEP APNEA: ICD-10-CM

## 2021-10-15 DIAGNOSIS — N17.9 AKI (ACUTE KIDNEY INJURY) (HCC): ICD-10-CM

## 2021-10-15 DIAGNOSIS — I50.810 RIGHT HEART FAILURE WITH PRESERVED RIGHT VENTRICULAR FUNCTION (HCC): ICD-10-CM

## 2021-10-15 DIAGNOSIS — Z79.899 HIGH RISK MEDICATION USE: ICD-10-CM

## 2021-10-15 NOTE — TELEPHONE ENCOUNTER
Called patient in regards to his Non-fasting labs that were order in his lat visit with NNEKA Alvarado. Patient stated that he had his labs done at [facility] Today 10/15/2021.  I will keep and eye for labs prior to appointment.

## 2021-10-20 ENCOUNTER — TELEMEDICINE (OUTPATIENT)
Dept: CARDIOLOGY | Facility: MEDICAL CENTER | Age: 46
End: 2021-10-20
Payer: COMMERCIAL

## 2021-10-20 VITALS
DIASTOLIC BLOOD PRESSURE: 86 MMHG | BODY MASS INDEX: 53.78 KG/M2 | HEART RATE: 100 BPM | SYSTOLIC BLOOD PRESSURE: 112 MMHG | HEIGHT: 64 IN | WEIGHT: 315 LBS

## 2021-10-20 DIAGNOSIS — G47.33 OBSTRUCTIVE SLEEP APNEA: ICD-10-CM

## 2021-10-20 DIAGNOSIS — Z79.899 HIGH RISK MEDICATION USE: ICD-10-CM

## 2021-10-20 DIAGNOSIS — E66.01 MORBID OBESITY (HCC): ICD-10-CM

## 2021-10-20 DIAGNOSIS — I50.810 RIGHT HEART FAILURE WITH PRESERVED RIGHT VENTRICULAR FUNCTION (HCC): ICD-10-CM

## 2021-10-20 DIAGNOSIS — I10 ESSENTIAL HYPERTENSION: ICD-10-CM

## 2021-10-20 PROCEDURE — 99214 OFFICE O/P EST MOD 30 MIN: CPT | Performed by: NURSE PRACTITIONER

## 2021-10-20 RX ORDER — POTASSIUM CHLORIDE 20 MEQ/1
20 TABLET, EXTENDED RELEASE ORAL 2 TIMES DAILY
Qty: 60 TABLET | Refills: 5 | Status: SHIPPED | OUTPATIENT
Start: 2021-10-20 | End: 2022-04-25 | Stop reason: SDUPTHER

## 2021-10-20 RX ORDER — FUROSEMIDE 40 MG/1
40 TABLET ORAL 2 TIMES DAILY
Qty: 60 TABLET | Refills: 5 | Status: SHIPPED | OUTPATIENT
Start: 2021-10-20 | End: 2021-11-24 | Stop reason: SDUPTHER

## 2021-10-20 ASSESSMENT — ENCOUNTER SYMPTOMS
ORTHOPNEA: 0
NERVOUS/ANXIOUS: 1
DIZZINESS: 1
ABDOMINAL PAIN: 0
SHORTNESS OF BREATH: 1
CLAUDICATION: 0
COUGH: 0
PALPITATIONS: 0
PND: 0
MYALGIAS: 0
FEVER: 0

## 2021-10-20 ASSESSMENT — FIBROSIS 4 INDEX: FIB4 SCORE: 0.89

## 2021-10-20 NOTE — PROGRESS NOTES
Chief Complaint   Patient presents with   • Congestive Heart Failure     F/V Dx:Right heart failure with preserved right ventricular function    • Hypertension       Subjective:   This evaluation was conducted via Zoom using secure and encrypted videoconferencing technology. The patient was in a private location in the [facility].    The patient's identity was confirmed and verbal consent was obtained for this virtual visit.    Shelli Moreira is a 45 y.o. male who presents today for follow-up on his heart failure.    Current patient of the Heart failure clinic.  He was last seen in clinic on 8/16/2021. During that visit, furosemide was increased to twice a day for 1 week, carvedilol was increased to 12.5 mg twice a day.  Patient reports no problems with the med changes.    Patient reports today he is feeling a little under the weather, he does report some congestion.  He states recently he also has had some slight increase in lower extremity edema.  He denies increase in shortness of breath.  He continues to have occasional dizziness with exertion, fatigue and he has been undergoing some stress and anxiety from work.    He reports he has been having difficulty sleeping because of his stress.    His weights have been up a little bit.  He states his weights are ranging around 359 pounds.    He continues to use oxygen at night.    He reports trying to limit his salt intake.    He did have his sleep study, he does have severe sleep apnea, but was unable to get his CPAP or BiPAP titrated.    Additonally, patient has the following medical problems:    -hospitalization from 6/18/2021 through 6/23/2021.  Patient had presented with shortness of breath and weight gain.  He was a transfer from [facility].  Patient was found to have acute hypoxemic respiratory failure.  An echo was performed and patient was found to have right-sided heart failure.  Patient has a history of sleep apnea and obesity.   Likely his CPAP is not as effective.  Patient diuresed and discharged home.  Patient recommended to follow-up for repeat sleep study    -Hypertension    -Sleep apnea    Past Medical History:   Diagnosis Date   • Asthma    • Congestive heart failure (HCC)    • Hypertension    • Sleep apnea      Past Surgical History:   Procedure Laterality Date   • KNEE ARTHROSCOPY Left 2009     Family History   Problem Relation Age of Onset   • Diabetes Mother    • No Known Problems Sister      Social History     Socioeconomic History   • Marital status:      Spouse name: Not on file   • Number of children: Not on file   • Years of education: Not on file   • Highest education level: Not on file   Occupational History   • Not on file   Tobacco Use   • Smoking status: Never Smoker   • Smokeless tobacco: Never Used   Vaping Use   • Vaping Use: Every day   • Substances: THC   • Devices: Disposable   Substance and Sexual Activity   • Alcohol use: Yes     Comment: rare    • Drug use: Yes     Types: Inhaled     Comment: THC   • Sexual activity: Not on file   Other Topics Concern   • Not on file   Social History Narrative   • Not on file     Social Determinants of Health     Financial Resource Strain:    • Difficulty of Paying Living Expenses:    Food Insecurity:    • Worried About Running Out of Food in the Last Year:    • Ran Out of Food in the Last Year:    Transportation Needs:    • Lack of Transportation (Medical):    • Lack of Transportation (Non-Medical):    Physical Activity:    • Days of Exercise per Week:    • Minutes of Exercise per Session:    Stress:    • Feeling of Stress :    Social Connections:    • Frequency of Communication with Friends and Family:    • Frequency of Social Gatherings with Friends and Family:    • Attends Faith Services:    • Active Member of Clubs or Organizations:    • Attends Club or Organization Meetings:    • Marital Status:    Intimate Partner Violence:    • Fear of Current or Ex-Partner:   "  • Emotionally Abused:    • Physically Abused:    • Sexually Abused:      No Known Allergies  Outpatient Encounter Medications as of 10/20/2021   Medication Sig Dispense Refill   • furosemide (LASIX) 40 MG Tab Take 1 Tablet by mouth 2 times a day. 60 Tablet 5   • potassium chloride SA (KDUR) 20 MEQ Tab CR Take 1 Tablet by mouth 2 times a day. 60 Tablet 5   • carvedilol (COREG) 12.5 MG Tab Take 1 Tablet by mouth 2 times a day with meals. 60 Tablet 11   • fluticasone (FLONASE) 50 MCG/ACT nasal spray Administer 2 Sprays into affected nostril(S) every day.     • tiotropium (SPIRIVA) 18 MCG Cap Place 18 mcg into inhaler and inhale every day.     • lisinopril-hydrochlorothiazide (PRINZIDE) 20-25 MG per tablet Take 1 tablet by mouth every day.     • amLODIPine (NORVASC) 10 MG Tab Take 10 mg by mouth every day.     • zolpidem (AMBIEN) 5 MG Tab Take 0.5 Tablets by mouth at bedtime as needed for Sleep for up to 2 doses. (Patient not taking: Reported on 10/20/2021) 1 Tablet 0   • [DISCONTINUED] furosemide (LASIX) 40 MG Tab Take 1 tablet by mouth every day. 60 tablet 3     No facility-administered encounter medications on file as of 10/20/2021.     Review of Systems   Constitutional: Positive for malaise/fatigue. Negative for fever.   HENT: Positive for congestion.    Respiratory: Positive for shortness of breath. Negative for cough.    Cardiovascular: Positive for leg swelling. Negative for chest pain, palpitations, orthopnea, claudication and PND.   Gastrointestinal: Negative for abdominal pain.   Musculoskeletal: Negative for myalgias.   Neurological: Positive for dizziness (occ with some exertion).   Psychiatric/Behavioral: The patient is nervous/anxious.    All other systems reviewed and are negative.       Objective:   /86 (BP Location: Left arm)   Pulse 100   Ht 1.626 m (5' 4\")   Wt (!) 163 kg (360 lb)   BMI 61.79 kg/m²     Physical Exam  Constitutional:       Appearance: He is well-developed. He is obese. "   HENT:      Head: Normocephalic and atraumatic.   Eyes:      General: Lids are normal.   Pulmonary:      Effort: Pulmonary effort is normal.   Musculoskeletal:      Cervical back: Normal range of motion.      Right lower leg: Edema (Visually observed) present.      Left lower leg: Edema (visually observed) present.   Neurological:      Mental Status: He is alert and oriented to person, place, and time.   Psychiatric:         Speech: Speech normal.         Behavior: Behavior normal.         Thought Content: Thought content normal.         Judgment: Judgment normal.       Lab Results   Component Value Date/Time    CHOLSTRLTOT 110 06/19/2021 12:45 AM    LDL 51 06/19/2021 12:45 AM    HDL 44 06/19/2021 12:45 AM    TRIGLYCERIDE 73 06/19/2021 12:45 AM       Lab Results   Component Value Date/Time    SODIUM 138 06/21/2021 12:13 AM    POTASSIUM 3.7 06/21/2021 12:13 AM    CHLORIDE 93 (L) 06/21/2021 12:13 AM    CO2 38 (H) 06/21/2021 12:13 AM    GLUCOSE 107 (H) 06/21/2021 12:13 AM    BUN 18 06/21/2021 12:13 AM    CREATININE 1.11 06/21/2021 12:13 AM     Lab Results   Component Value Date/Time    ALKPHOSPHAT 63 06/20/2021 12:04 AM    ASTSGOT 22 06/20/2021 12:04 AM    ALTSGPT 24 06/20/2021 12:04 AM    TBILIRUBIN 0.8 06/20/2021 12:04 AM      Transthoracic Echo Report 6/18/2021  No prior study is available for comparison.   Normal left ventricular systolic function.   No evidence of valvular abnormality based on Doppler evaluation.   Severely dilated right ventricle. Normal right ventricular systolic   function.  Unable to estimate pulmonary artery pressure due to an inadequate   tricuspid regurgitant jet    Assessment:     1. Right heart failure with preserved right ventricular function (HCC)  Basic Metabolic Panel   2. High risk medication use  Basic Metabolic Panel   3. Essential hypertension  Basic Metabolic Panel   4. Obstructive sleep apnea     5. Morbid obesity (HCC)         Medical Decision Making:  Today's Assessment /  Status / Plan:   1.  Right-sided heart failure versus, obesity hypoventilation syndrome:  - pt does exhibit some LE edema   -Increase furosemide to 40 mg twice a day (long term for now)  -Start potassium 20 mEq twice a day  -BMP in 1-2 weeks (patient did have borderline low potassium level on lab testing)  -Reinforced s/sx of worsening symptoms with patient and weight monitoring. Pt verbalizes understanding. Pt to call office or RTC if present.   -Continue low-sodium diet when able    2.  Sleep apnea:  -Continue to follow-up with sleep medicine and their recommendations  -Continue CPAP use for now    3.  Obesity: BMI 61.79  -Encourage weight loss    4.  Hypertension: Stable  -Continue amlodipine 10 mg daily  -Continue carvedilol 12.5 mg twice a day  -Continue lisinopril-hydrochlorothiazide 20-25 mg daily    5.  Respiratory failure:  -Continue oxygen at 3 L at night    FU in clinic in 4 weeks with labs. Sooner if needed.    Patient verbalizes understanding and agrees with the plan of care.     PLEASE NOTE: This Note was created using voice recognition Software. I have made every reasonable attempt to correct obvious errors, but I expect that there are errors of grammar and possibly content that I did not discover before finalizing the note

## 2021-11-16 ENCOUNTER — TELEPHONE (OUTPATIENT)
Dept: CARDIOLOGY | Facility: MEDICAL CENTER | Age: 46
End: 2021-11-16

## 2021-11-16 NOTE — TELEPHONE ENCOUNTER
Contacted Patient in regards to his non-fasting labs that were order on his last visit with NNEKA Alvarado. Per patient I mail out his labs and will try to get them done prior to his appointment with NNEKA Alvarado confirmed date and time with patient.

## 2021-11-22 DIAGNOSIS — I10 ESSENTIAL HYPERTENSION: ICD-10-CM

## 2021-11-22 DIAGNOSIS — Z79.899 HIGH RISK MEDICATION USE: ICD-10-CM

## 2021-11-22 DIAGNOSIS — I50.810 RIGHT HEART FAILURE WITH PRESERVED RIGHT VENTRICULAR FUNCTION (HCC): ICD-10-CM

## 2021-11-24 ENCOUNTER — OFFICE VISIT (OUTPATIENT)
Dept: INTERNAL MEDICINE | Facility: OTHER | Age: 46
End: 2021-11-24
Payer: COMMERCIAL

## 2021-11-24 ENCOUNTER — OFFICE VISIT (OUTPATIENT)
Dept: CARDIOLOGY | Facility: MEDICAL CENTER | Age: 46
End: 2021-11-24
Payer: COMMERCIAL

## 2021-11-24 VITALS
WEIGHT: 315 LBS | HEIGHT: 64 IN | OXYGEN SATURATION: 93 % | HEART RATE: 94 BPM | DIASTOLIC BLOOD PRESSURE: 84 MMHG | TEMPERATURE: 98.3 F | SYSTOLIC BLOOD PRESSURE: 128 MMHG | BODY MASS INDEX: 53.78 KG/M2

## 2021-11-24 VITALS
HEIGHT: 64 IN | SYSTOLIC BLOOD PRESSURE: 130 MMHG | DIASTOLIC BLOOD PRESSURE: 84 MMHG | OXYGEN SATURATION: 93 % | HEART RATE: 91 BPM | BODY MASS INDEX: 53.78 KG/M2 | WEIGHT: 315 LBS | RESPIRATION RATE: 16 BRPM

## 2021-11-24 DIAGNOSIS — I50.810 RIGHT HEART FAILURE WITH PRESERVED RIGHT VENTRICULAR FUNCTION (HCC): ICD-10-CM

## 2021-11-24 DIAGNOSIS — E11.9 TYPE 2 DIABETES MELLITUS WITHOUT COMPLICATION, WITHOUT LONG-TERM CURRENT USE OF INSULIN (HCC): ICD-10-CM

## 2021-11-24 DIAGNOSIS — G47.33 OBSTRUCTIVE SLEEP APNEA: ICD-10-CM

## 2021-11-24 DIAGNOSIS — I10 ESSENTIAL HYPERTENSION: ICD-10-CM

## 2021-11-24 DIAGNOSIS — Z79.899 HIGH RISK MEDICATION USE: ICD-10-CM

## 2021-11-24 DIAGNOSIS — I10 PRIMARY HYPERTENSION: ICD-10-CM

## 2021-11-24 DIAGNOSIS — E66.01 MORBID OBESITY (HCC): ICD-10-CM

## 2021-11-24 DIAGNOSIS — N17.9 AKI (ACUTE KIDNEY INJURY) (HCC): ICD-10-CM

## 2021-11-24 LAB
HBA1C MFR BLD: 6 % (ref 0–5.6)
INT CON NEG: NEGATIVE
INT CON POS: POSITIVE

## 2021-11-24 PROCEDURE — 99214 OFFICE O/P EST MOD 30 MIN: CPT | Performed by: NURSE PRACTITIONER

## 2021-11-24 PROCEDURE — 83036 HEMOGLOBIN GLYCOSYLATED A1C: CPT | Mod: GC | Performed by: STUDENT IN AN ORGANIZED HEALTH CARE EDUCATION/TRAINING PROGRAM

## 2021-11-24 PROCEDURE — 99214 OFFICE O/P EST MOD 30 MIN: CPT | Mod: GC | Performed by: STUDENT IN AN ORGANIZED HEALTH CARE EDUCATION/TRAINING PROGRAM

## 2021-11-24 RX ORDER — SPIRONOLACTONE 25 MG/1
25 TABLET ORAL DAILY
Qty: 30 TABLET | Refills: 3 | Status: SHIPPED | OUTPATIENT
Start: 2021-11-24 | End: 2022-03-28

## 2021-11-24 RX ORDER — FUROSEMIDE 40 MG/1
40-80 TABLET ORAL 2 TIMES DAILY
Qty: 90 TABLET | Refills: 5 | Status: SHIPPED | OUTPATIENT
Start: 2021-11-24 | End: 2022-04-25 | Stop reason: SDUPTHER

## 2021-11-24 ASSESSMENT — ENCOUNTER SYMPTOMS
SPUTUM PRODUCTION: 0
ORTHOPNEA: 0
SHORTNESS OF BREATH: 1
WEAKNESS: 0
MYALGIAS: 0
NERVOUS/ANXIOUS: 1
COUGH: 0
BLURRED VISION: 0
ABDOMINAL PAIN: 0
COUGH: 0
PND: 0
FOCAL WEAKNESS: 0
ABDOMINAL PAIN: 0
EYE PAIN: 0
SHORTNESS OF BREATH: 1
BACK PAIN: 1
PALPITATIONS: 0
CLAUDICATION: 0
CHILLS: 0
DIZZINESS: 0
CONSTIPATION: 0
HEADACHES: 0
NAUSEA: 0
DIZZINESS: 0
FEVER: 0
SORE THROAT: 0
MYALGIAS: 0
FEVER: 0
INSOMNIA: 1
VOMITING: 0
DOUBLE VISION: 0
PALPITATIONS: 0
NECK PAIN: 0
DIARRHEA: 0

## 2021-11-24 ASSESSMENT — FIBROSIS 4 INDEX
FIB4 SCORE: 0.89
FIB4 SCORE: 0.89

## 2021-11-24 NOTE — PATIENT INSTRUCTIONS
Increase furosemide to 80 mg in AM and continue 40 mg in Afternoon  Start spironolactone 25 mg daily   Labs in 1 week

## 2021-11-24 NOTE — ASSESSMENT & PLAN NOTE
Has CPAP/BiPAP combination machine at home, using nightly, but feels settings are not titrated correctly at this time.  -Sleep medicine follow up in January  -Counseled diet/exercise  -bariatric surgery referral

## 2021-11-24 NOTE — ASSESSMENT & PLAN NOTE
Diastolic failure with EF 55%, decompensated due to acute worsening of BRANDEN.  -Lasix 40mg PO BID (three times daily for next 3-5 days)  -Kdur 20mEq PO BID (double dose for next 3-5 days)  -Coreg 12.5mg PO BID  -lisinopril-HCTZ 20-25mg PO qD  -Follow up with cardiology scheduled for today (11/24)

## 2021-11-24 NOTE — PROGRESS NOTES
CC:   Chief Complaint   Patient presents with   • Hypertension Follow-up   • Wheezing                                                                                                                                           HPI:   Shelli presents today with the following.    Follow up of HTN, sleep apnea, and Diastolic heart failure.    Wife reported concerns of phlegmy sounds while eating. He does not notice/is bothered by noise. Wife says it seems like he's struggling to breath while eating. No difficulty swallowing.    Also, pulmonologist was seen, says has wheeze. Sleep has been more difficult, has not been able to do sleep study for BiPAP titration--scheduled for January. He has had an interval increase in LE swelling in the past few weeks. Has fatigue, exertional dyspnea but able to do most activities at home; SOB happens predominantly with walking uphill, +30 minutes of cleaning garage.    There are no diagnoses linked to this encounter.    Patient Active Problem List    Diagnosis Date Noted   • Right heart failure with preserved right ventricular function ([facility]) 06/19/2021   • Obstructive sleep apnea 06/19/2021   • Respiratory failure ([facility]) 06/21/2021   • Morbid obesity ([facility]) 06/19/2021   • SHARITA (acute kidney injury) ([facility]) 06/19/2021   • Hypertension 06/19/2021   • Type 2 diabetes mellitus without complication, without long-term current use of insulin ([facility]) 06/21/2021   • Carpal tunnel syndrome 12/07/2005       Current Outpatient Medications   Medication Sig Dispense Refill   • metFORMIN (GLUCOPHAGE) 500 MG Tab Take 1 Tablet by mouth 2 times a day with meals. 180 Tablet 3   • furosemide (LASIX) 40 MG Tab Take 1 Tablet by mouth 2 times a day. 60 Tablet 5   • potassium chloride SA (KDUR) 20 MEQ Tab CR Take 1 Tablet by mouth 2 times a day. 60 Tablet 5   • carvedilol (COREG) 12.5 MG Tab Take 1 Tablet by mouth 2 times a day with meals. 60 Tablet 11   • fluticasone (FLONASE) 50 MCG/ACT nasal spray Administer  "2 Sprays into affected nostril(S) every day.     • tiotropium (SPIRIVA) 18 MCG Cap Place 18 mcg into inhaler and inhale every day.     • lisinopril-hydrochlorothiazide (PRINZIDE) 20-25 MG per tablet Take 1 tablet by mouth every day.     • amLODIPine (NORVASC) 10 MG Tab Take 10 mg by mouth every day.       No current facility-administered medications for this visit.         Allergies as of 11/24/2021   • (No Known Allergies)        /84 (BP Location: Left arm, Patient Position: Sitting, BP Cuff Size: Adult)   Pulse 94   Temp 36.8 °C (98.3 °F) (Temporal)   Ht 1.626 m (5' 4\")   Wt (!) 166 kg (366 lb)   SpO2 93%   BMI 62.82 kg/m²     Review of Systems   Constitutional: Negative for chills and fever.   HENT: Negative for congestion and sore throat.    Eyes: Negative for blurred vision, double vision and pain.   Respiratory: Positive for shortness of breath. Negative for cough and sputum production.    Cardiovascular: Positive for leg swelling. Negative for chest pain and palpitations.   Gastrointestinal: Negative for abdominal pain, constipation, diarrhea, nausea and vomiting.   Genitourinary: Negative for dysuria, frequency and urgency.   Musculoskeletal: Positive for back pain. Negative for myalgias and neck pain.   Neurological: Negative for dizziness, focal weakness, weakness and headaches.        Physical Exam:  Gen:  Alert and oriented, No apparent distress.  Neuro:  CN II-XII intact, no focal deficits  Lungs:  CTAB  CV:  RRR no m/g/r; 1+ bilateral LE edema  Abd:  Soft, nontender, nondistended  Skin:  No acute rash/lesions  Ext:  Shoulder/elbow flexion/extension 5/5 bilaterally and symmetric; ambulates independently with steady gait      Assessment and Plan.   Shelli Moreira is a 45 y.o. male with the following issues:    Hypertension  Blood pressure in clinic today excellent. Showing signs of volume overload, most likely related to BRANDEN/diastolic HF.  -Amlodpine 10mg PO qD  -Lisinopril-HCTZ 20-25mg PO " qD  -Coreg 12.5mg PO BID  -Lasix 40mg PO BID (do three times daily for next 3-5 days)  -BMP recheck in 2 weeks    Type 2 diabetes mellitus without complication, without long-term current use of insulin (HCC)  A1c in clinic was 6 today. To start metformin low dose.  -A1c repeat in February (3 month recheck)  -metformin 500mg PO BID  -Follow up in 3 months    Right heart failure with preserved right ventricular function (HCC)  Diastolic failure with EF 55%, decompensated due to acute worsening of BRANDEN.  -Lasix 40mg PO BID (three times daily for next 3-5 days)  -Kdur 20mEq PO BID (double dose for next 3-5 days)  -Coreg 12.5mg PO BID  -lisinopril-HCTZ 20-25mg PO qD  -Follow up with cardiology scheduled for today (11/24)    Obstructive sleep apnea  Has CPAP/BiPAP combination machine at home, using nightly, but feels settings are not titrated correctly at this time.  -Sleep medicine follow up in January  -Counseled diet/exercise  -bariatric surgery referral

## 2021-11-24 NOTE — PROGRESS NOTES
Chief Complaint   Patient presents with   • Follow-Up     F/V Dx: Right heart failure with preserved right ventricular function (HCC)        Subjective:   Shelli Moreira is a 45 y.o. male who presents today for follow-up on his heart failure.    Current patient of the Heart failure clinic.  He was last seen in clinic on 10/20/2021 through a virtual visit.    No changes were made during his last visit.    Patient comes in the office today reporting that he feels his lower extremity edema has worsened along with shortness of breath with exertion.  Patient also has been having difficulty sleeping.  He did see his primary care provider today and discussed it with him.    Patient was recommended to increase his diuretics per PCP, but to also discuss with me at the appointment today.    Patient reports he has not been weighing himself at home.    His other symptoms include fatigue.    He denies chest pain, palpitations, orthopnea, PND or dizziness/lightheadedness.    He is under stress at work and his daughter is sick as well.    He continues to use oxygen at night.    He reports trying to limit his salt intake.    He did have his sleep study, he does have severe sleep apnea, but was unable to get his CPAP or BiPAP titrated.  He is using his old CPAP and is waiting for a repeat sleep study.    Additonally, patient has the following medical problems:    -hospitalization from 6/18/2021 through 6/23/2021.  Patient had presented with shortness of breath and weight gain.  He was a transfer from [facility].  Patient was found to have acute hypoxemic respiratory failure.  An echo was performed and patient was found to have right-sided heart failure.  Patient has a history of sleep apnea and obesity.  Likely his CPAP is not as effective.  Patient diuresed and discharged home.  Patient recommended to follow-up for repeat sleep study    -Hypertension    -Sleep apnea    Past Medical History:   Diagnosis Date   •  Asthma    • Congestive heart failure (HCC)    • Hypertension    • Sleep apnea      Past Surgical History:   Procedure Laterality Date   • KNEE ARTHROSCOPY Left 2009     Family History   Problem Relation Age of Onset   • Diabetes Mother    • No Known Problems Sister      Social History     Socioeconomic History   • Marital status:      Spouse name: Not on file   • Number of children: Not on file   • Years of education: Not on file   • Highest education level: Not on file   Occupational History   • Not on file   Tobacco Use   • Smoking status: Never Smoker   • Smokeless tobacco: Never Used   Vaping Use   • Vaping Use: Every day   • Substances: THC   • Devices: Disposable   Substance and Sexual Activity   • Alcohol use: Yes     Comment: rare    • Drug use: Yes     Types: Inhaled     Comment: THC   • Sexual activity: Not on file   Other Topics Concern   • Not on file   Social History Narrative   • Not on file     Social Determinants of Health     Financial Resource Strain:    • Difficulty of Paying Living Expenses: Not on file   Food Insecurity:    • Worried About Running Out of Food in the Last Year: Not on file   • Ran Out of Food in the Last Year: Not on file   Transportation Needs:    • Lack of Transportation (Medical): Not on file   • Lack of Transportation (Non-Medical): Not on file   Physical Activity:    • Days of Exercise per Week: Not on file   • Minutes of Exercise per Session: Not on file   Stress:    • Feeling of Stress : Not on file   Social Connections:    • Frequency of Communication with Friends and Family: Not on file   • Frequency of Social Gatherings with Friends and Family: Not on file   • Attends Nondenominational Services: Not on file   • Active Member of Clubs or Organizations: Not on file   • Attends Club or Organization Meetings: Not on file   • Marital Status: Not on file   Intimate Partner Violence:    • Fear of Current or Ex-Partner: Not on file   • Emotionally Abused: Not on file   •  Physically Abused: Not on file   • Sexually Abused: Not on file   Housing Stability:    • Unable to Pay for Housing in the Last Year: Not on file   • Number of Places Lived in the Last Year: Not on file   • Unstable Housing in the Last Year: Not on file     No Known Allergies  Outpatient Encounter Medications as of 11/24/2021   Medication Sig Dispense Refill   • metFORMIN (GLUCOPHAGE) 500 MG Tab Take 1 Tablet by mouth 2 times a day with meals. 180 Tablet 3   • spironolactone (ALDACTONE) 25 MG Tab Take 1 Tablet by mouth every day. 30 Tablet 3   • furosemide (LASIX) 40 MG Tab Take 1-2 Tablets by mouth 2 times a day. Take 80 mg in AM and 40 mg in afternoon 90 Tablet 5   • potassium chloride SA (KDUR) 20 MEQ Tab CR Take 1 Tablet by mouth 2 times a day. 60 Tablet 5   • carvedilol (COREG) 12.5 MG Tab Take 1 Tablet by mouth 2 times a day with meals. 60 Tablet 11   • fluticasone (FLONASE) 50 MCG/ACT nasal spray Administer 2 Sprays into affected nostril(S) every day.     • tiotropium (SPIRIVA) 18 MCG Cap Place 18 mcg into inhaler and inhale every day.     • lisinopril-hydrochlorothiazide (PRINZIDE) 20-25 MG per tablet Take 1 tablet by mouth every day.     • amLODIPine (NORVASC) 10 MG Tab Take 10 mg by mouth every day.     • [DISCONTINUED] furosemide (LASIX) 40 MG Tab Take 1 Tablet by mouth 2 times a day. 60 Tablet 5     No facility-administered encounter medications on file as of 11/24/2021.     Review of Systems   Constitutional: Positive for malaise/fatigue. Negative for fever.   HENT: Positive for congestion.    Respiratory: Positive for shortness of breath. Negative for cough.    Cardiovascular: Positive for leg swelling. Negative for chest pain, palpitations, orthopnea, claudication and PND.   Gastrointestinal: Negative for abdominal pain.   Musculoskeletal: Negative for myalgias.   Neurological: Negative for dizziness.   Psychiatric/Behavioral: The patient is nervous/anxious and has insomnia.    All other systems  "reviewed and are negative.       Objective:   /84 (BP Location: Left arm, Patient Position: Sitting, BP Cuff Size: Adult)   Pulse 91   Resp 16   Ht 1.626 m (5' 4\")   Wt (!) 167 kg (369 lb)   SpO2 93%   BMI 63.34 kg/m²     Physical Exam  Vitals reviewed.   Constitutional:       Appearance: He is well-developed.   HENT:      Head: Normocephalic and atraumatic.   Eyes:      Pupils: Pupils are equal, round, and reactive to light.   Neck:      Vascular: No JVD.   Cardiovascular:      Rate and Rhythm: Normal rate and regular rhythm.      Heart sounds: Normal heart sounds.   Pulmonary:      Effort: Pulmonary effort is normal. No respiratory distress.      Breath sounds: Normal breath sounds. No wheezing or rales.   Abdominal:      General: Bowel sounds are normal.      Palpations: Abdomen is soft.   Musculoskeletal:         General: Normal range of motion.      Cervical back: Normal range of motion and neck supple.      Right lower le+ Pitting Edema present.      Left lower le+ Pitting Edema present.   Skin:     General: Skin is warm and dry.   Neurological:      General: No focal deficit present.      Mental Status: He is alert and oriented to person, place, and time.   Psychiatric:         Behavior: Behavior normal.       Lab Results   Component Value Date/Time    CHOLSTRLTOT 110 2021 12:45 AM    LDL 51 2021 12:45 AM    HDL 44 2021 12:45 AM    TRIGLYCERIDE 73 2021 12:45 AM       Lab Results   Component Value Date/Time    SODIUM 138 2021 12:13 AM    POTASSIUM 3.7 2021 12:13 AM    CHLORIDE 93 (L) 2021 12:13 AM    CO2 38 (H) 2021 12:13 AM    GLUCOSE 107 (H) 2021 12:13 AM    BUN 18 2021 12:13 AM    CREATININE 1.11 2021 12:13 AM     Lab Results   Component Value Date/Time    ALKPHOSPHAT 63 2021 12:04 AM    ASTSGOT 22 2021 12:04 AM    ALTSGPT 24 2021 12:04 AM    TBILIRUBIN 0.8 2021 12:04 AM      Transthoracic Echo " Report 6/18/2021  No prior study is available for comparison.   Normal left ventricular systolic function.   No evidence of valvular abnormality based on Doppler evaluation.   Severely dilated right ventricle. Normal right ventricular systolic   function.  Unable to estimate pulmonary artery pressure due to an inadequate   tricuspid regurgitant jet    Assessment:     1. High risk medication use  Basic Metabolic Panel   2. Right heart failure with preserved right ventricular function (HCC)  Basic Metabolic Panel   3. SHARITA (acute kidney injury) ([facility])  Basic Metabolic Panel   4. Obstructive sleep apnea     5. Morbid obesity ([facility])     6. Essential hypertension         Medical Decision Making:  Today's Assessment / Status / Plan:   1.  Right-sided heart failure versus, obesity hypoventilation syndrome:  - pt does exhibit some LE edema   -Increase furosemide to 80 mg in the a.m. and continue 40 mg in the afternoon.  -Start spironolactone 25 mg daily  -Patient encouraged to start weighing himself to see if he is losing weight  -Continue potassium 20 mEq twice a day  -BMP in 1-2 weeks (patient did have borderline low potassium level on lab testing)  -Reinforced s/sx of worsening symptoms with patient and weight monitoring. Pt verbalizes understanding. Pt to call office or RTC if present.   -Continue low-sodium diet when able    2.  Sleep apnea:  -Continue to follow-up with sleep medicine and their recommendations  -trying to get a repeat sleep study.  -Continue CPAP use for now and oxygen     3.  Obesity: BMI 61.79  -Encourage weight loss    4.  Hypertension: Stable  -Continue amlodipine 10 mg daily  -Continue carvedilol 12.5 mg twice a day  -Continue lisinopril-hydrochlorothiazide 20-25 mg daily    5.  Respiratory failure:  -Continue oxygen at 3 L at night    FU in clinic in 2-4 weeks with labs. Sooner if needed.    Patient verbalizes understanding and agrees with the plan of care.     PLEASE NOTE: This Note was created  using voice recognition Software. I have made every reasonable attempt to correct obvious errors, but I expect that there are errors of grammar and possibly content that I did not discover before finalizing the note

## 2021-11-24 NOTE — ASSESSMENT & PLAN NOTE
A1c in clinic was 6 today. To start metformin low dose.  -A1c repeat in February (3 month recheck)  -metformin 500mg PO BID  -Follow up in 3 months

## 2021-11-24 NOTE — PATIENT INSTRUCTIONS
Take lasix three times a day for the next 3-5 days, and double your potassium (KDUR) alongside the Lasix. Return to normal doses by the 5th day, if this is still a concern, call in to clinic.  Follow up in 3 months for metformin, DM2, medications check

## 2021-11-24 NOTE — ASSESSMENT & PLAN NOTE
Blood pressure in clinic today excellent. Showing signs of volume overload, most likely related to BRANDEN/diastolic HF.  -Amlodpine 10mg PO qD  -Lisinopril-HCTZ 20-25mg PO qD  -Coreg 12.5mg PO BID  -Lasix 40mg PO BID (do three times daily for next 3-5 days)  -BMP recheck in 2 weeks

## 2021-12-06 DIAGNOSIS — N17.9 AKI (ACUTE KIDNEY INJURY) (HCC): ICD-10-CM

## 2021-12-06 DIAGNOSIS — I50.810 RIGHT HEART FAILURE WITH PRESERVED RIGHT VENTRICULAR FUNCTION (HCC): ICD-10-CM

## 2021-12-06 DIAGNOSIS — Z79.899 HIGH RISK MEDICATION USE: ICD-10-CM

## 2021-12-09 ENCOUNTER — TELEPHONE (OUTPATIENT)
Dept: SLEEP MEDICINE | Facility: MEDICAL CENTER | Age: 46
End: 2021-12-09

## 2021-12-09 ENCOUNTER — OFFICE VISIT (OUTPATIENT)
Dept: SLEEP MEDICINE | Facility: MEDICAL CENTER | Age: 46
End: 2021-12-09
Payer: COMMERCIAL

## 2021-12-09 ENCOUNTER — OFFICE VISIT (OUTPATIENT)
Dept: CARDIOLOGY | Facility: MEDICAL CENTER | Age: 46
End: 2021-12-09
Payer: COMMERCIAL

## 2021-12-09 VITALS
OXYGEN SATURATION: 95 % | SYSTOLIC BLOOD PRESSURE: 118 MMHG | RESPIRATION RATE: 16 BRPM | HEIGHT: 64 IN | HEART RATE: 106 BPM | WEIGHT: 315 LBS | BODY MASS INDEX: 53.78 KG/M2 | DIASTOLIC BLOOD PRESSURE: 70 MMHG

## 2021-12-09 VITALS
OXYGEN SATURATION: 90 % | DIASTOLIC BLOOD PRESSURE: 68 MMHG | HEART RATE: 102 BPM | WEIGHT: 315 LBS | BODY MASS INDEX: 53.78 KG/M2 | HEIGHT: 64 IN | RESPIRATION RATE: 16 BRPM | SYSTOLIC BLOOD PRESSURE: 102 MMHG

## 2021-12-09 DIAGNOSIS — I50.810 RIGHT HEART FAILURE WITH PRESERVED RIGHT VENTRICULAR FUNCTION (HCC): ICD-10-CM

## 2021-12-09 DIAGNOSIS — E87.29 CO2 RETENTION: ICD-10-CM

## 2021-12-09 DIAGNOSIS — G47.33 OSA (OBSTRUCTIVE SLEEP APNEA): ICD-10-CM

## 2021-12-09 DIAGNOSIS — G47.33 OBSTRUCTIVE SLEEP APNEA: ICD-10-CM

## 2021-12-09 DIAGNOSIS — E66.01 MORBID OBESITY (HCC): ICD-10-CM

## 2021-12-09 DIAGNOSIS — Z79.899 HIGH RISK MEDICATION USE: ICD-10-CM

## 2021-12-09 DIAGNOSIS — I10 ESSENTIAL HYPERTENSION: ICD-10-CM

## 2021-12-09 PROCEDURE — 99214 OFFICE O/P EST MOD 30 MIN: CPT | Performed by: NURSE PRACTITIONER

## 2021-12-09 PROCEDURE — 99213 OFFICE O/P EST LOW 20 MIN: CPT | Performed by: NURSE PRACTITIONER

## 2021-12-09 RX ORDER — ZOLPIDEM TARTRATE 5 MG/1
5 TABLET ORAL NIGHTLY PRN
Qty: 3 TABLET | Refills: 0 | Status: SHIPPED | OUTPATIENT
Start: 2021-12-09 | End: 2022-01-24

## 2021-12-09 ASSESSMENT — ENCOUNTER SYMPTOMS
CLAUDICATION: 0
DIZZINESS: 0
ORTHOPNEA: 0
PALPITATIONS: 0
INSOMNIA: 1
COUGH: 0
SHORTNESS OF BREATH: 1
PND: 0
MYALGIAS: 0
NERVOUS/ANXIOUS: 1
ABDOMINAL PAIN: 0
FEVER: 0

## 2021-12-09 ASSESSMENT — FIBROSIS 4 INDEX
FIB4 SCORE: 0.89
FIB4 SCORE: 0.89

## 2021-12-09 NOTE — PROGRESS NOTES
Chief Complaint   Patient presents with   • Congestive Heart Failure   • HTN (Controlled)       Subjective:   Shelli Moreira is a 45 y.o. male who presents today for follow-up on his heart failure with his wife.    Current patient of the Heart failure clinic.  He was last seen in clinic on 11/24/2021.  During that visit, his furosemide was increased and he was started on spironolactone 25 mg daily.  He reports no problems with medication changes.    He reports improvement of his lower extremity edema with the medication changes.  He does continue to have shortness of breath with exertion, but states his congestion has improved.    He did sleep medicine today, and is scheduled for sleep study tomorrow night.    He did start weighing self at home, he states his home weights around 350 pounds.    He continues to use oxygen at night.    He reports trying to limit his salt intake.    He did have his sleep study, he does have severe sleep apnea, but was unable to get his CPAP or BiPAP titrated.  He is using his old CPAP and is waiting for a repeat sleep study.    Additonally, patient has the following medical problems:    -hospitalization from 6/18/2021 through 6/23/2021.  Patient had presented with shortness of breath and weight gain.  He was a transfer from [facility].  Patient was found to have acute hypoxemic respiratory failure.  An echo was performed and patient was found to have right-sided heart failure.  Patient has a history of sleep apnea and obesity.  Likely his CPAP is not as effective.  Patient diuresed and discharged home.  Patient recommended to follow-up for repeat sleep study    -Hypertension    -Sleep apnea    Past Medical History:   Diagnosis Date   • Asthma    • Congestive heart failure (HCC)    • Hypertension    • Sleep apnea      Past Surgical History:   Procedure Laterality Date   • KNEE ARTHROSCOPY Left 2009     Family History   Problem Relation Age of Onset   • Diabetes Mother    •  No Known Problems Sister      Social History     Socioeconomic History   • Marital status:      Spouse name: Not on file   • Number of children: Not on file   • Years of education: Not on file   • Highest education level: Not on file   Occupational History   • Not on file   Tobacco Use   • Smoking status: Never Smoker   • Smokeless tobacco: Never Used   Vaping Use   • Vaping Use: Every day   • Substances: THC   • Devices: Disposable   Substance and Sexual Activity   • Alcohol use: Yes     Comment: rare    • Drug use: Yes     Types: Inhaled     Comment: THC   • Sexual activity: Not on file   Other Topics Concern   • Not on file   Social History Narrative   • Not on file     Social Determinants of Health     Financial Resource Strain:    • Difficulty of Paying Living Expenses: Not on file   Food Insecurity:    • Worried About Running Out of Food in the Last Year: Not on file   • Ran Out of Food in the Last Year: Not on file   Transportation Needs:    • Lack of Transportation (Medical): Not on file   • Lack of Transportation (Non-Medical): Not on file   Physical Activity:    • Days of Exercise per Week: Not on file   • Minutes of Exercise per Session: Not on file   Stress:    • Feeling of Stress : Not on file   Social Connections:    • Frequency of Communication with Friends and Family: Not on file   • Frequency of Social Gatherings with Friends and Family: Not on file   • Attends Pent[employer] Services: Not on file   • Active Member of Clubs or Organizations: Not on file   • Attends Club or Organization Meetings: Not on file   • Marital Status: Not on file   Intimate Partner Violence:    • Fear of Current or Ex-Partner: Not on file   • Emotionally Abused: Not on file   • Physically Abused: Not on file   • Sexually Abused: Not on file   Housing Stability:    • Unable to Pay for Housing in the Last Year: Not on file   • Number of Places Lived in the Last Year: Not on file   • Unstable Housing in the Last Year: Not on  file     No Known Allergies  Outpatient Encounter Medications as of 12/9/2021   Medication Sig Dispense Refill   • zolpidem (AMBIEN) 5 MG Tab Take 1 Tablet by mouth at bedtime as needed for Sleep (1 to 3 po qhs prn insomnia/sleep study. Bring to sleep study.) for up to 3 doses. 3 Tablet 0   • metFORMIN (GLUCOPHAGE) 500 MG Tab Take 1 Tablet by mouth 2 times a day with meals. 180 Tablet 3   • spironolactone (ALDACTONE) 25 MG Tab Take 1 Tablet by mouth every day. 30 Tablet 3   • furosemide (LASIX) 40 MG Tab Take 1-2 Tablets by mouth 2 times a day. Take 80 mg in AM and 40 mg in afternoon 90 Tablet 5   • potassium chloride SA (KDUR) 20 MEQ Tab CR Take 1 Tablet by mouth 2 times a day. 60 Tablet 5   • carvedilol (COREG) 12.5 MG Tab Take 1 Tablet by mouth 2 times a day with meals. 60 Tablet 11   • fluticasone (FLONASE) 50 MCG/ACT nasal spray Administer 2 Sprays into affected nostril(S) every day.     • tiotropium (SPIRIVA) 18 MCG Cap Place 18 mcg into inhaler and inhale every day.     • lisinopril-hydrochlorothiazide (PRINZIDE) 20-25 MG per tablet Take 1 tablet by mouth every day.     • amLODIPine (NORVASC) 10 MG Tab Take 10 mg by mouth every day.       No facility-administered encounter medications on file as of 12/9/2021.     Review of Systems   Constitutional: Positive for malaise/fatigue. Negative for fever.   HENT: Positive for congestion (improved).    Respiratory: Positive for shortness of breath. Negative for cough.    Cardiovascular: Positive for leg swelling. Negative for chest pain, palpitations, orthopnea, claudication and PND.   Gastrointestinal: Negative for abdominal pain.   Musculoskeletal: Negative for myalgias.   Neurological: Negative for dizziness.   Psychiatric/Behavioral: The patient is nervous/anxious and has insomnia.    All other systems reviewed and are negative.       Objective:   /68 (BP Location: Right arm, Patient Position: Sitting, BP Cuff Size: Adult)   Pulse (!) 102   Resp 16   Ht  "1.626 m (5' 4\")   Wt (!) 165 kg (364 lb)   SpO2 90%   BMI 62.48 kg/m²     Physical Exam  Vitals reviewed.   Constitutional:       Appearance: He is well-developed. He is morbidly obese.   HENT:      Head: Normocephalic and atraumatic.   Eyes:      Pupils: Pupils are equal, round, and reactive to light.   Neck:      Vascular: No JVD.   Cardiovascular:      Rate and Rhythm: Normal rate and regular rhythm.      Heart sounds: Normal heart sounds.   Pulmonary:      Effort: Pulmonary effort is normal. No respiratory distress.      Breath sounds: Normal breath sounds. No wheezing or rales.   Abdominal:      General: Bowel sounds are normal.      Palpations: Abdomen is soft.   Musculoskeletal:         General: Normal range of motion.      Cervical back: Normal range of motion and neck supple.      Right lower leg: No edema.      Left lower leg: No edema.   Skin:     General: Skin is warm and dry.   Neurological:      General: No focal deficit present.      Mental Status: He is alert and oriented to person, place, and time.   Psychiatric:         Behavior: Behavior normal.       Lab Results   Component Value Date/Time    CHOLSTRLTOT 110 06/19/2021 12:45 AM    LDL 51 06/19/2021 12:45 AM    HDL 44 06/19/2021 12:45 AM    TRIGLYCERIDE 73 06/19/2021 12:45 AM       Lab Results   Component Value Date/Time    SODIUM 138 06/21/2021 12:13 AM    POTASSIUM 3.7 06/21/2021 12:13 AM    CHLORIDE 93 (L) 06/21/2021 12:13 AM    CO2 38 (H) 06/21/2021 12:13 AM    GLUCOSE 107 (H) 06/21/2021 12:13 AM    BUN 18 06/21/2021 12:13 AM    CREATININE 1.11 06/21/2021 12:13 AM     Lab Results   Component Value Date/Time    ALKPHOSPHAT 63 06/20/2021 12:04 AM    ASTSGOT 22 06/20/2021 12:04 AM    ALTSGPT 24 06/20/2021 12:04 AM    TBILIRUBIN 0.8 06/20/2021 12:04 AM      Transthoracic Echo Report 6/18/2021  No prior study is available for comparison.   Normal left ventricular systolic function.   No evidence of valvular abnormality based on Doppler " evaluation.   Severely dilated right ventricle. Normal right ventricular systolic   function.  Unable to estimate pulmonary artery pressure due to an inadequate   tricuspid regurgitant jet    Assessment:     1. High risk medication use  Basic Metabolic Panel   2. Right heart failure with preserved right ventricular function (HCC)  Basic Metabolic Panel   3. Obstructive sleep apnea  Basic Metabolic Panel   4. Morbid obesity (HCC)     5. Essential hypertension         Medical Decision Making:  Today's Assessment / Status / Plan:   1.  Right-sided heart failure versus, obesity hypoventilation syndrome:  -Patient's lower extremity edema has improved, he is looking more euvolemic  -Continue furosemide to 80 mg in the a.m. and continue 40 mg in the afternoon.  -Continue spironolactone 25 mg daily  -Continue potassium 20 mEq twice a day  -BMP in 6 weeks, potassium level better on recent testing  -Reinforced s/sx of worsening symptoms with patient and weight monitoring. Pt verbalizes understanding. Pt to call office or RTC if present.   -Continue low-sodium diet when able    2.  Sleep apnea:  -Continue to follow-up with sleep medicine and their recommendations  -sleep study schedule for tomorrow night   -Continue CPAP use for now and oxygen     3.  Obesity: BMI 62.48  -Encourage weight loss  -Discussed importance of weight loss    4.  Hypertension: Stable  -Continue amlodipine 10 mg daily  -Continue carvedilol 12.5 mg twice a day  -Continue lisinopril-hydrochlorothiazide 20-25 mg daily    5.  Respiratory failure:  -Continue oxygen at 3 L at night    FU in clinic in 6 weeks with labs. Sooner if needed.    Patient verbalizes understanding and agrees with the plan of care.     PLEASE NOTE: This Note was created using voice recognition Software. I have made every reasonable attempt to correct obvious errors, but I expect that there are errors of grammar and possibly content that I did not discover before finalizing the  note

## 2021-12-09 NOTE — PROGRESS NOTES
Chief Complaint   Patient presents with   • Follow-Up     SS results       HPI:  Shelli Moreira is a 45 y.o. year old male here today for follow-up on BRANDEN.  Last seen 8/30/2021 by Dr Johnson.  Patient states he was previously on BiPAP but at previous visit only had a CPAP machine that he endorsed did not work well.  What you want this patient has a past medical history of essential hypertension, morbid obesity (BMI 61.28), acute kidney injury, asthma, recent respiratory failure, and right heart failure with preserved right ventricular function.  He was previously studied using a home sleep study approximately 20 years ago and he was started on BiPAP.  However he has BiPAP machine stopped working and he purchased a CPAP machine.  He attempted using the CPAP machine until he was hospitalized in June of this year when he was told that it was completely inadequate to address his sleep apnea.    Split-night sleep study (9/11/2021):  1.  Severe BRANDEN with an AHI of 107.4  2.  Hypoventilation with end-tidal CO2 as high as 71 mmHg  3.  Severe nocturnal hypoxemia: The nikki saturation was 57% and saturations were less than 90% for 98.3% of the diagnostic recording     TREATMENT:   1.  Unsuccessful CPAP titration  2.  Unsuccessful BiPAP titration    Recommendation:   Unfortunately these results cannot be extrapolated to any meaningful positive airway pressure settings.  Recommend the patient return for a dedicated IVAPS titration.      ROS: As per HPI and otherwise negative if not stated.    Past Medical History:   Diagnosis Date   • Asthma    • Congestive heart failure (HCC)    • Hypertension    • Sleep apnea        Past Surgical History:   Procedure Laterality Date   • KNEE ARTHROSCOPY Left 2009       Family History   Problem Relation Age of Onset   • Diabetes Mother    • No Known Problems Sister        Allergies as of 12/09/2021   • (No Known Allergies)        Vitals:  /70 (BP Location: Left arm, Patient Position:  "Sitting, BP Cuff Size: Large adult)   Pulse (!) 106   Resp 16   Ht 1.626 m (5' 4\")   Wt (!) 164 kg (361 lb 1.6 oz)   SpO2 95%     Current medications as of today   Current Outpatient Medications   Medication Sig Dispense Refill   • zolpidem (AMBIEN) 5 MG Tab Take 1 Tablet by mouth at bedtime as needed for Sleep (1 to 3 po qhs prn insomnia/sleep study. Bring to sleep study.) for up to 3 doses. 3 Tablet 0   • metFORMIN (GLUCOPHAGE) 500 MG Tab Take 1 Tablet by mouth 2 times a day with meals. 180 Tablet 3   • spironolactone (ALDACTONE) 25 MG Tab Take 1 Tablet by mouth every day. 30 Tablet 3   • furosemide (LASIX) 40 MG Tab Take 1-2 Tablets by mouth 2 times a day. Take 80 mg in AM and 40 mg in afternoon 90 Tablet 5   • potassium chloride SA (KDUR) 20 MEQ Tab CR Take 1 Tablet by mouth 2 times a day. 60 Tablet 5   • carvedilol (COREG) 12.5 MG Tab Take 1 Tablet by mouth 2 times a day with meals. 60 Tablet 11   • fluticasone (FLONASE) 50 MCG/ACT nasal spray Administer 2 Sprays into affected nostril(S) every day.     • tiotropium (SPIRIVA) 18 MCG Cap Place 18 mcg into inhaler and inhale every day.     • lisinopril-hydrochlorothiazide (PRINZIDE) 20-25 MG per tablet Take 1 tablet by mouth every day.     • amLODIPine (NORVASC) 10 MG Tab Take 10 mg by mouth every day.       No current facility-administered medications for this visit.         Physical Exam:   Gen:           Alert and oriented, No apparent distress. Mood and affect appropriate, normal interaction with examiner.  Eyes:          PERRL, EOM intact, sclere white, conjunctive moist.  Ears:          Not examined.   Hearing:     Grossly intact.  Nose:          Normal, no lesions or deformities.  Dentition:    Good dentition.  Oropharynx:   Tongue normal, posterior pharynx without erythema or exudate.  Neck:        Supple, trachea midline, no masses.  Respiratory Effort: No intercostal retractions or use of accessory muscles.   Lung Auscultation:      Clear to " auscultation bilaterally; no rales, rhonchi or wheezing.  CV:            Regular rate and rhythm. No murmurs, rubs or gallops.  Abd:           Not examined.   Lymphadenopathy: Not examined.  Gait and Station: Normal.  Digits and Nails: No clubbing, cyanosis, petechiae, or nodes.   Cranial Nerves: II-XII grossly intact.  Skin:        No rashes, lesions or ulcers noted.               Ext:           No cyanosis or edema.      Assessment:  1. BRANDEN (obstructive sleep apnea)  zolpidem (AMBIEN) 5 MG Tab    Polysomnography Titration    CANCELED: Polysomnography Titration   2. Hypoventilation in      3. CO2 retention         Plan:  Due to the severity of his sleep apnea along with hypoventilation and CO2 retention, I am ordering a stat iron Sikhism titration.  I will review results and order recommended machine.  We will follow-up in approximately 4 months.  I am hopeful that he will be able to be on the machine for some time and we will be able to to view a compliance.    Ambien is prescribed only for the night of the SS. I have obtained and reviewed patient utilization report from [facility] prior to writing prescription for controlled substance II, III or IV. Based on the report and my clinical assessment the prescription is medically necessary. Pt was advised to use Ambien on as needed basis. Do not drive or use fast moving machinery after taking the medication for at least 6-8 hrs. Side affects were discussed in detail.    Please note that this dictation was created using voice recognition software. I have made every reasonable attempt to correct obvious errors, but it is possible there are errors of grammar and possibly content that I did not discover before finalizing the note.

## 2021-12-10 ENCOUNTER — SLEEP STUDY (OUTPATIENT)
Dept: SLEEP MEDICINE | Facility: MEDICAL CENTER | Age: 46
End: 2021-12-10
Attending: NURSE PRACTITIONER
Payer: COMMERCIAL

## 2021-12-10 DIAGNOSIS — G47.33 OSA (OBSTRUCTIVE SLEEP APNEA): ICD-10-CM

## 2021-12-10 PROCEDURE — 95811 POLYSOM 6/>YRS CPAP 4/> PARM: CPT | Performed by: INTERNAL MEDICINE

## 2021-12-10 NOTE — TELEPHONE ENCOUNTER
Valery from [facility] in [place], [place]. Would like to confirm vanessa Pompa prescribed RX ambier 5 mg 3 tabs no refills. States because it is a [facility] provider and [facility] rx needs confirmation before filling RX. Informed per our records vanessa Pompa did place the prescription.

## 2022-01-10 NOTE — PROCEDURES
MONTAGE: Standard  STUDY TYPE:Therapeutic IVAPS  RECORDING TECHNIQUE:   After the scalp was prepared, gold plated electrodes were applied to the scalp according to the International 10-20 System. EEG (electroencephalogram) was continuously monitored from the O1-M2, O2-M1, C3-M2, C4-M1, F3-M2, and F4-M1. EOGs (electrooculograms) were monitored by electrodes placed at the left and right outer canthi. Chin EMG (electromyogram) was monitored by electrodes placed on the mentalis and sub-mentalis muscles. Nasal and oral airflow were monitored using a triple port thermocouple as well as oronasal pressure transducer. Respiratory effort was measured by inductive plethysmography technology employing abdominal and thoracic belts. Blood oxygen saturation and pulse were monitored by pulse oximetry. Heart rhythm was monitored by surface electrocardiogram. Leg EMG was studied using surface electrodes placed on left and right anterior tibialis. A microphone was used to monitor tracheal sounds and snoring. Body position was monitored and documented by technician observation.   SCORING CRITERIA:   A modification of the AASM manual for scoring of sleep and associated events was used. Obstructive apneas were scored by cessation of airflow for at least 10 seconds with continuing respiratory effort. Central apneas were scored by cessation of airflow for at least 10 seconds with no respiratory effort. Hypopneas were scored by a 30% or more reduction in airflow for at least 10 seconds accompanied by arterial oxygen desaturation of 3% or an arousal. For CMS (Medicare) patients, per AASM rule 1B, hypopneas are scored by 30% with mild reduction in airflow for at least 10 seconds accompanied by arterial saturation decreased at 4%.  Study start time was 10:19:12 PM. Diagnostic recording time was 8h 2.0m with a total sleep time of 6h 6.0m resulting in a sleep efficiency of 75.93%%. Sleep latency from the start of the study was 01 minutes and the  latency from sleep to REM was 118 minutes. In total,236 arousals were scored for an arousal index of 38.7.  Respiratory:  There were a total of 85 apneas consisting of 0 obstructive apneas, 0 mixed apneas, and 85 central apneas. A total of 274 hypopneas were scored. The apnea index was 13.93 per hour and the hypopnea index was 44.92 per hour resulting in an overall AHI of 58.85. AHI during rem was 53.5 and AHI while supine was 65.74.  Oximetry:  There was a mean oxygen saturation of 88.0%. The minimum oxygen saturation in NREM was 66.0 % and in REM was 75.0. The patient spent 160.9 minutes of TST with SaO2 <88%.  Cardiac:  The highest heart rate seen while awake was 99 BPM while the highest heart rate during sleep was 87 BPM with an average sleeping heart rate of 75 BPM.  Limb Movements:  There were a total of 4 PLMs during sleep which resulted in a PLMS index of 0.7. Of these, 8 were associated with arousals which resulted in a PLMS arousal index of 1.3.    Titration:     This was a fully attended sleep study. This test was technically adequate.  This was an IVAPS titration.  The patient chose to use a DreamWear nasal pillow size large.  The technician noted extremely fragmented sleep with frequent arousals due to respiratory events.  Frequent snoring and gasping were noted throughout the study.  Treatment was initiated with a target VA at 3.7, target OH of 16, EPAP 5, maximum pressure support 20, minimum pressure support 4, rise time 700, TI max 1.9, and TI min 0.8.  Unfortunately the apnea hypopnea index failed to normalize on any tested IVAPS settings.  The patient did best with EPAP 20, Target VA 4.9, target OH 20, maximum pressure support 10, minimum pressure support 8, rise time 500, TI max 1.5, and TI min 0.8.  With this the patient's AHI was 15.0 with a minimum saturation of 84% and a mean saturation of 91%.  The titration with these settings included supine REM sleep.    The patient's baseline study showed  an AHI of 107.4, nikki saturation 57%, and saturations less than 90% for 98.3% of the recording.  The his end-tidal CO2 was as high as 71 mmHg.  The patient's BMI 62.5 and his weight is 364 pounds.  The patient's venous bicarb is elevated consistent with obesity hypoventilation syndrome.              ASSESSMENT:    Incomplete IVAPS titration  The patient did best with EPAP 20, Target VA 4.9, target IN 20, maximum pressure support 10, minimum pressure support 8, rise time 500, TI max 1.5 and TI min 0.8 with an AH I of 15.0, a minimum saturation of 84%, and a mean saturation of 91%.  The titration on these settings included supine REM sleep.  Diagnostic study showed severe baseline BRANDEN with an AHI of 107.4, nikki saturation of 57%, and saturations less than 90% for 98.3% of the diagnostic recording  Obesity hypoventilation syndrome.  His diagnostic study showed an end-tidal CO2 as high as 71 mmHg.        RECOMMENDATION:    Recommend EPAP 20 cm water, target VA 4.9, target IN 20, maximum pressure support 10 cm water, minimum pressure support 8 cm water, advised on 500, TI max 1.5 and TI min 0.8 using large DreamWear nasal pillows and heated humidification followed by clinical and compliance review in 31 to 90 days after receiving his equipment.        Dr. Chapin Muñoz MD

## 2022-01-16 ENCOUNTER — PATIENT MESSAGE (OUTPATIENT)
Dept: SLEEP MEDICINE | Facility: MEDICAL CENTER | Age: 47
End: 2022-01-16

## 2022-01-16 DIAGNOSIS — G47.33 OSA (OBSTRUCTIVE SLEEP APNEA): ICD-10-CM

## 2022-01-16 DIAGNOSIS — E87.29 CO2 RETENTION: ICD-10-CM

## 2022-01-17 NOTE — TELEPHONE ENCOUNTER
From: Shelli Moreira  To: Nurse Practitioner Fredo Regalado  Sent: 1/16/2022 6:40 PM PST  Subject: 12/10 Sleep Study Results    Hello,  I haven't gotten the results yet from my sleep study in December. I want to get them as soon as possible so I can plan for what is needed next. Please give me an update as soon as you can.     Thank you.

## 2022-01-20 ENCOUNTER — TELEPHONE (OUTPATIENT)
Dept: CARDIOLOGY | Facility: MEDICAL CENTER | Age: 47
End: 2022-01-20

## 2022-01-20 NOTE — TELEPHONE ENCOUNTER
Called patient in regards to his non fasting labs that was order on his last visit with NNEKA Alvarado. Patient stated that he will be having his labs done 01/21/2021. Confirmed appointment with patient. Patient stated if we could switch his appointment to Virtual he resides in [place]. Per patient request change appt. to Virtual.

## 2022-01-24 ENCOUNTER — TELEMEDICINE (OUTPATIENT)
Dept: CARDIOLOGY | Facility: MEDICAL CENTER | Age: 47
End: 2022-01-24
Payer: COMMERCIAL

## 2022-01-24 VITALS
DIASTOLIC BLOOD PRESSURE: 81 MMHG | HEIGHT: 64 IN | WEIGHT: 315 LBS | BODY MASS INDEX: 53.78 KG/M2 | SYSTOLIC BLOOD PRESSURE: 135 MMHG | HEART RATE: 84 BPM

## 2022-01-24 DIAGNOSIS — Z79.899 HIGH RISK MEDICATION USE: ICD-10-CM

## 2022-01-24 DIAGNOSIS — G47.33 OBSTRUCTIVE SLEEP APNEA: ICD-10-CM

## 2022-01-24 DIAGNOSIS — N17.9 AKI (ACUTE KIDNEY INJURY) (HCC): ICD-10-CM

## 2022-01-24 DIAGNOSIS — E66.01 MORBID OBESITY (HCC): ICD-10-CM

## 2022-01-24 DIAGNOSIS — I10 ESSENTIAL HYPERTENSION: ICD-10-CM

## 2022-01-24 DIAGNOSIS — I50.810 RIGHT HEART FAILURE WITH PRESERVED RIGHT VENTRICULAR FUNCTION (HCC): ICD-10-CM

## 2022-01-24 PROCEDURE — 99214 OFFICE O/P EST MOD 30 MIN: CPT | Mod: 95 | Performed by: NURSE PRACTITIONER

## 2022-01-24 ASSESSMENT — ENCOUNTER SYMPTOMS
FEVER: 0
COUGH: 0
INSOMNIA: 1
CLAUDICATION: 0
PND: 0
NERVOUS/ANXIOUS: 1
SHORTNESS OF BREATH: 1
ABDOMINAL PAIN: 0
DIZZINESS: 0
MYALGIAS: 0
ORTHOPNEA: 0
PALPITATIONS: 0

## 2022-01-24 ASSESSMENT — FIBROSIS 4 INDEX: FIB4 SCORE: 0.91

## 2022-01-24 NOTE — PROGRESS NOTES
Chief Complaint   Patient presents with   • Hypertension   • Congestive Heart Failure     F/V Dx: Right heart failure with preserved right ventricular function (HCC)        Subjective:   This evaluation was conducted via Zoom using secure and encrypted videoconferencing technology. The patient was in a private location in the [facility].    The patient's identity was confirmed and verbal consent was obtained for this virtual visit.    Shelli Moreira is a 45 y.o. male who presents today for follow-up on his heart failure with his wife.    Current patient of the Heart failure clinic.  He was last seen in clinic on 12/9/2021.  During that visit, no changes remain.  Patient was pending a sleep study.  Patient was recommended to follow-up with lab testing.      Patient is waiting for results on his sleep study.  He did reach out to sleep medicine.    Patient reports since his last visit, he did lose some weight down to 340 pounds, but then with the holidays and stress, his weight went up to 360 pounds.  He does acknowledge that his eating habits contributes to his weight gain.    He did discuss bariatric surgery with his PCP, but states he feels it will not be helpful as he knows his stress from his job is contributing to his eating habits thus his weight gain.    Patient is currently looking for another job to hopefully help decrease his stress levels.    He also is considering counseling.    He reports swelling that comes and goes, continued shortness of breath and fatigue.  He denies chest pain, palpitations, orthopnea, PND or dizziness/lightheadedness.  He acknowledges his sodium intake affects his swelling.    He continues to use oxygen at night.    He reports trying to limit his salt intake.      He did have his sleep study, he does have severe sleep apnea, but was unable to get his CPAP or BiPAP titrated.  He is using his old CPAP and is waiting for a repeat sleep study.    Additonally, patient has the  following medical problems:    -hospitalization from 6/18/2021 through 6/23/2021.  Patient had presented with shortness of breath and weight gain.  He was a transfer from [facility].  Patient was found to have acute hypoxemic respiratory failure.  An echo was performed and patient was found to have right-sided heart failure.  Patient has a history of sleep apnea and obesity.  Likely his CPAP is not as effective.  Patient diuresed and discharged home.  Patient recommended to follow-up for repeat sleep study    -Hypertension    -Sleep apnea    Past Medical History:   Diagnosis Date   • Asthma    • Congestive heart failure (HCC)    • Hypertension    • Sleep apnea      Past Surgical History:   Procedure Laterality Date   • KNEE ARTHROSCOPY Left 2009     Family History   Problem Relation Age of Onset   • Diabetes Mother    • No Known Problems Sister      Social History     Socioeconomic History   • Marital status:      Spouse name: Not on file   • Number of children: Not on file   • Years of education: Not on file   • Highest education level: Not on file   Occupational History   • Not on file   Tobacco Use   • Smoking status: Never Smoker   • Smokeless tobacco: Never Used   Vaping Use   • Vaping Use: Every day   • Substances: THC   • Devices: Disposable   Substance and Sexual Activity   • Alcohol use: Yes     Comment: rare    • Drug use: Yes     Types: Inhaled     Comment: THC   • Sexual activity: Not on file   Other Topics Concern   • Not on file   Social History Narrative   • Not on file     Social Determinants of Health     Financial Resource Strain:    • Difficulty of Paying Living Expenses: Not on file   Food Insecurity:    • Worried About Running Out of Food in the Last Year: Not on file   • Ran Out of Food in the Last Year: Not on file   Transportation Needs:    • Lack of Transportation (Medical): Not on file   • Lack of Transportation (Non-Medical): Not on file   Physical Activity:    • Days  of Exercise per Week: Not on file   • Minutes of Exercise per Session: Not on file   Stress:    • Feeling of Stress : Not on file   Social Connections:    • Frequency of Communication with Friends and Family: Not on file   • Frequency of Social Gatherings with Friends and Family: Not on file   • Attends Christianity Services: Not on file   • Active Member of Clubs or Organizations: Not on file   • Attends Club or Organization Meetings: Not on file   • Marital Status: Not on file   Intimate Partner Violence:    • Fear of Current or Ex-Partner: Not on file   • Emotionally Abused: Not on file   • Physically Abused: Not on file   • Sexually Abused: Not on file   Housing Stability:    • Unable to Pay for Housing in the Last Year: Not on file   • Number of Places Lived in the Last Year: Not on file   • Unstable Housing in the Last Year: Not on file     No Known Allergies  Outpatient Encounter Medications as of 1/24/2022   Medication Sig Dispense Refill   • metFORMIN (GLUCOPHAGE) 500 MG Tab Take 1 Tablet by mouth 2 times a day with meals. 180 Tablet 3   • spironolactone (ALDACTONE) 25 MG Tab Take 1 Tablet by mouth every day. 30 Tablet 3   • furosemide (LASIX) 40 MG Tab Take 1-2 Tablets by mouth 2 times a day. Take 80 mg in AM and 40 mg in afternoon 90 Tablet 5   • potassium chloride SA (KDUR) 20 MEQ Tab CR Take 1 Tablet by mouth 2 times a day. 60 Tablet 5   • carvedilol (COREG) 12.5 MG Tab Take 1 Tablet by mouth 2 times a day with meals. 60 Tablet 11   • fluticasone (FLONASE) 50 MCG/ACT nasal spray Administer 2 Sprays into affected nostril(S) every day.     • tiotropium (SPIRIVA) 18 MCG Cap Place 18 mcg into inhaler and inhale every day.     • lisinopril-hydrochlorothiazide (PRINZIDE) 20-25 MG per tablet Take 1 tablet by mouth every day.     • amLODIPine (NORVASC) 10 MG Tab Take 10 mg by mouth every day.     • [DISCONTINUED] zolpidem (AMBIEN) 5 MG Tab Take 1 Tablet by mouth at bedtime as needed for Sleep (1 to 3 po qhs prn  "insomnia/sleep study. Bring to sleep study.) for up to 3 doses. (Patient not taking: Reported on 1/24/2022) 3 Tablet 0     No facility-administered encounter medications on file as of 1/24/2022.     Review of Systems   Constitutional: Positive for malaise/fatigue. Negative for fever.   HENT: Negative for congestion.    Respiratory: Positive for shortness of breath. Negative for cough.    Cardiovascular: Positive for leg swelling. Negative for chest pain, palpitations, orthopnea, claudication and PND.   Gastrointestinal: Negative for abdominal pain.   Musculoskeletal: Negative for myalgias.   Neurological: Negative for dizziness.   Psychiatric/Behavioral: The patient is nervous/anxious and has insomnia.    All other systems reviewed and are negative.       Objective:   /81 (BP Location: Left arm, Patient Position: Sitting, BP Cuff Size: Adult)   Pulse 84   Ht 1.626 m (5' 4\")   Wt (!) 163 kg (360 lb)   BMI 61.79 kg/m²     Physical Exam  Vitals reviewed.   Constitutional:       Appearance: He is well-developed. He is morbidly obese.   HENT:      Head: Normocephalic and atraumatic.   Eyes:      Pupils: Pupils are equal, round, and reactive to light.   Neck:      Vascular: No JVD.   Cardiovascular:      Rate and Rhythm: Normal rate and regular rhythm.      Heart sounds: Normal heart sounds.   Pulmonary:      Effort: Pulmonary effort is normal. No respiratory distress.      Breath sounds: Normal breath sounds. No wheezing or rales.   Abdominal:      General: Bowel sounds are normal.      Palpations: Abdomen is soft.   Musculoskeletal:         General: Normal range of motion.      Cervical back: Normal range of motion and neck supple.      Right lower leg: No edema.      Left lower leg: No edema.   Skin:     General: Skin is warm and dry.   Neurological:      General: No focal deficit present.      Mental Status: He is alert and oriented to person, place, and time.   Psychiatric:         Behavior: Behavior " normal.       Lab Results   Component Value Date/Time    CHOLSTRLTOT 110 06/19/2021 12:45 AM    LDL 51 06/19/2021 12:45 AM    HDL 44 06/19/2021 12:45 AM    TRIGLYCERIDE 73 06/19/2021 12:45 AM       Lab Results   Component Value Date/Time    SODIUM 138 06/21/2021 12:13 AM    POTASSIUM 3.7 06/21/2021 12:13 AM    CHLORIDE 93 (L) 06/21/2021 12:13 AM    CO2 38 (H) 06/21/2021 12:13 AM    GLUCOSE 107 (H) 06/21/2021 12:13 AM    BUN 18 06/21/2021 12:13 AM    CREATININE 1.11 06/21/2021 12:13 AM     Lab Results   Component Value Date/Time    ALKPHOSPHAT 63 06/20/2021 12:04 AM    ASTSGOT 22 06/20/2021 12:04 AM    ALTSGPT 24 06/20/2021 12:04 AM    TBILIRUBIN 0.8 06/20/2021 12:04 AM      Transthoracic Echo Report 6/18/2021  No prior study is available for comparison.   Normal left ventricular systolic function.   No evidence of valvular abnormality based on Doppler evaluation.   Severely dilated right ventricle. Normal right ventricular systolic   function.  Unable to estimate pulmonary artery pressure due to an inadequate   tricuspid regurgitant jet    Assessment:     1. High risk medication use  Basic Metabolic Panel   2. Right heart failure with preserved right ventricular function (HCC)  Basic Metabolic Panel   3. Morbid obesity ([facility])  Basic Metabolic Panel   4. SHARITA (acute kidney injury) ([facility])  Basic Metabolic Panel   5. Essential hypertension  Basic Metabolic Panel   6. Obstructive sleep apnea         Medical Decision Making:  Today's Assessment / Status / Plan:   1.  Right-sided heart failure versus, obesity hypoventilation syndrome:  -Patient reports his edema comes and goes related to his salt intake.  -He understands low-sodium diet, encourage patient to stay on low-sodium diet and watch his portions.  -Continue furosemide to 80 mg in the a.m. and continue 40 mg in the afternoon for now  -Continue spironolactone 25 mg daily  -Continue potassium 20 mEq twice a day  -BMP in 3 months, potassium level stable on recent  testing  -Reinforced s/sx of worsening symptoms with patient and weight monitoring. Pt verbalizes understanding. Pt to call office or RTC if present.   -Continue low-sodium diet when able    2.  Sleep apnea:  -Continue to follow-up with sleep medicine and their recommendations  -Patient completed his sleep study  -Continue CPAP use for now and oxygen     3.  Obesity: BMI 61.79  -Encourage weight loss  -Discussed importance of weight loss    4.  Hypertension: Stable  -Continue amlodipine 10 mg daily  -Continue carvedilol 12.5 mg twice a day  -Continue lisinopril-hydrochlorothiazide 20-25 mg daily    5.  Respiratory failure:  -Continue oxygen at 3 L at night    FU in clinic in 3 months with labs. Sooner if needed.    Patient verbalizes understanding and agrees with the plan of care.     PLEASE NOTE: This Note was created using voice recognition Software. I have made every reasonable attempt to correct obvious errors, but I expect that there are errors of grammar and possibly content that I did not discover before finalizing the note

## 2022-02-01 NOTE — PROGRESS NOTES
Reviewed titration study on IPAP machine.  Order placed for new eyedrops with scheduled follow-up with me in April.  Patient is amendable to starting therapy at this time.

## 2022-02-08 ENCOUNTER — TELEPHONE (OUTPATIENT)
Dept: SLEEP MEDICINE | Facility: MEDICAL CENTER | Age: 47
End: 2022-02-08

## 2022-02-08 NOTE — TELEPHONE ENCOUNTER
I haven't been able to send the orders as the sleep study has not been signed.  Also, you were going to fix your note?  Please advise.

## 2022-03-07 ENCOUNTER — OFFICE VISIT (OUTPATIENT)
Dept: INTERNAL MEDICINE | Facility: OTHER | Age: 47
End: 2022-03-07
Payer: COMMERCIAL

## 2022-03-07 VITALS
DIASTOLIC BLOOD PRESSURE: 84 MMHG | OXYGEN SATURATION: 92 % | TEMPERATURE: 98.3 F | HEART RATE: 96 BPM | HEIGHT: 64 IN | SYSTOLIC BLOOD PRESSURE: 135 MMHG | BODY MASS INDEX: 53.78 KG/M2 | WEIGHT: 315 LBS

## 2022-03-07 DIAGNOSIS — E11.9 TYPE 2 DIABETES MELLITUS WITHOUT COMPLICATION, WITHOUT LONG-TERM CURRENT USE OF INSULIN (HCC): ICD-10-CM

## 2022-03-07 DIAGNOSIS — G47.33 OBSTRUCTIVE SLEEP APNEA: ICD-10-CM

## 2022-03-07 LAB
HBA1C MFR BLD: 5.3 % (ref 0–5.6)
INT CON NEG: NEGATIVE
INT CON POS: POSITIVE

## 2022-03-07 PROCEDURE — 99214 OFFICE O/P EST MOD 30 MIN: CPT | Mod: GC | Performed by: STUDENT IN AN ORGANIZED HEALTH CARE EDUCATION/TRAINING PROGRAM

## 2022-03-07 PROCEDURE — 83036 HEMOGLOBIN GLYCOSYLATED A1C: CPT | Performed by: STUDENT IN AN ORGANIZED HEALTH CARE EDUCATION/TRAINING PROGRAM

## 2022-03-07 ASSESSMENT — ENCOUNTER SYMPTOMS
SHORTNESS OF BREATH: 1
CHILLS: 0
DIZZINESS: 0
DOUBLE VISION: 0
ABDOMINAL PAIN: 0
DEPRESSION: 0
VOMITING: 0
BLURRED VISION: 0
ORTHOPNEA: 0
NERVOUS/ANXIOUS: 0
WEAKNESS: 0
MYALGIAS: 0
PALPITATIONS: 0
NAUSEA: 0
FEVER: 0
BACK PAIN: 0
HEADACHES: 0
CONSTIPATION: 0
COUGH: 0
EYE PAIN: 0
NECK PAIN: 0
BLOOD IN STOOL: 0
DIARRHEA: 0

## 2022-03-07 ASSESSMENT — FIBROSIS 4 INDEX: FIB4 SCORE: 0.91

## 2022-03-07 NOTE — PROGRESS NOTES
"      CC:   Chief Complaint   Patient presents with   • Follow-Up                                                                                                                                           HPI:   Shelli presents today with the following.    Still needing PAP equipment, there appears to be some hold up from 1/16 on getting sleep study results officially \"read\" to finalise order to [employer].    Overall no complaints otherwise. Has not had any chest pain, shortness of breath, or extremity swelling. Has changed his diet recently, reduced volume/calorie intake.      There are no diagnoses linked to this encounter.    Patient Active Problem List    Diagnosis Date Noted   • Right heart failure with preserved right ventricular function ([facility]) 06/19/2021   • Obstructive sleep apnea 06/19/2021   • Respiratory failure ([facility]) 06/21/2021   • Morbid obesity ([facility]) 06/19/2021   • SHARITA (acute kidney injury) ([facility]) 06/19/2021   • Hypertension 06/19/2021   • Type 2 diabetes mellitus without complication, without long-term current use of insulin ([facility]) 06/21/2021   • Carpal tunnel syndrome 12/07/2005       Current Outpatient Medications   Medication Sig Dispense Refill   • metFORMIN (GLUCOPHAGE) 500 MG Tab Take 1 Tablet by mouth 2 times a day with meals. 180 Tablet 3   • spironolactone (ALDACTONE) 25 MG Tab Take 1 Tablet by mouth every day. 30 Tablet 3   • furosemide (LASIX) 40 MG Tab Take 1-2 Tablets by mouth 2 times a day. Take 80 mg in AM and 40 mg in afternoon 90 Tablet 5   • potassium chloride SA (KDUR) 20 MEQ Tab CR Take 1 Tablet by mouth 2 times a day. 60 Tablet 5   • carvedilol (COREG) 12.5 MG Tab Take 1 Tablet by mouth 2 times a day with meals. 60 Tablet 11   • fluticasone (FLONASE) 50 MCG/ACT nasal spray Administer 2 Sprays into affected nostril(S) every day.     • tiotropium (SPIRIVA) 18 MCG Cap Place 18 mcg into inhaler and inhale every day.     • lisinopril-hydrochlorothiazide (PRINZIDE) 20-25 MG per tablet " "Take 1 tablet by mouth every day.     • amLODIPine (NORVASC) 10 MG Tab Take 10 mg by mouth every day.       No current facility-administered medications for this visit.         Allergies as of 03/07/2022   • (No Known Allergies)        /84 (BP Location: Left arm, Patient Position: Sitting, BP Cuff Size: Large adult)   Pulse 96   Temp 36.8 °C (98.3 °F) (Temporal)   Ht 1.626 m (5' 4\")   Wt (!) 160 kg (352 lb)   SpO2 92%   BMI 60.42 kg/m²     Review of Systems   Constitutional: Negative for chills and fever.   Eyes: Negative for blurred vision, double vision and pain.   Respiratory: Positive for shortness of breath. Negative for cough.    Cardiovascular: Negative for chest pain, palpitations, orthopnea and leg swelling.   Gastrointestinal: Negative for abdominal pain, blood in stool, constipation, diarrhea, nausea and vomiting.   Genitourinary: Negative for dysuria, frequency, hematuria and urgency.   Musculoskeletal: Positive for joint pain. Negative for back pain, myalgias and neck pain.   Neurological: Negative for dizziness, weakness and headaches.   Psychiatric/Behavioral: Negative for depression. The patient is not nervous/anxious.         Physical Exam:  Gen:  Alert and oriented, No apparent distress.  Neuro:  CN II-XII intact, no focal deficits  Lungs:  CTAB  CV:  RRR no m/g/r  Abd:  Soft, nontender, nondistended  Skin:  No acute rash/lesions  Ext:  Shoulder/elbow flexion/extension 5/5 bilaterally and symmetric; ambulates independently with steady gait      Assessment and Plan.   Mr Shelli Moreira is a 46 y.o. male with the following issues:    Obstructive sleep apnea  [facility]. Completed sleep study, awaiting equipment delivery. Declined bariatrics evaluation.  -Sleep medicine follow up  -Will call sleep med for 'study read' which appears to be holding up equipment delivery    Type 2 diabetes mellitus without complication, without long-term current use of insulin (HCC)  A1c in past 6, in clinic today " is 5.3. Reports improved dietary changes.  -A1c repeat in April  -Ur microalbumin  -lipid panel

## 2022-03-08 NOTE — ASSESSMENT & PLAN NOTE
[facility]. Completed sleep study, awaiting equipment delivery. Declined bariatrics evaluation.  -Sleep medicine follow up  -Will call sleep med for 'study read' which appears to be holding up equipment delivery

## 2022-03-08 NOTE — ASSESSMENT & PLAN NOTE
A1c in past 6, in clinic today is 5.3. Reports improved dietary changes.  -A1c repeat in April  -Ur microalbumin  -lipid panel

## 2022-03-18 ENCOUNTER — PATIENT MESSAGE (OUTPATIENT)
Dept: INTERNAL MEDICINE | Facility: OTHER | Age: 47
End: 2022-03-18

## 2022-03-18 NOTE — PATIENT COMMUNICATION
Spoke to pt , apnea machine order being forwarded to local [facility] [place] branch , pt will follow up

## 2022-03-18 NOTE — TELEPHONE ENCOUNTER
From: Shelli Moreira  To: Resident Jonah Turpin  Sent: 3/18/2022 9:12 AM PDT  Subject: Sleep Apnea Machine    Hello,    We are following up with you because we are still not getting anywhere. The last message from [facility] was that they had sent the machine request and corresponding paperwork to [facility] in [place]. Today we contacted [facility] and they have been trying to contact [facility] for a month because they do not have the type of machine that was requested. I will be contacting [facility] again but I don't have much hope of getting anywhere.    We appreciate any help you can provide. This is beyond frustrating.     Thank you

## 2022-03-28 RX ORDER — SPIRONOLACTONE 25 MG/1
TABLET ORAL
Qty: 30 TABLET | Refills: 3 | Status: SHIPPED | OUTPATIENT
Start: 2022-03-28 | End: 2022-04-25 | Stop reason: SDUPTHER

## 2022-04-11 ENCOUNTER — APPOINTMENT (OUTPATIENT)
Dept: SLEEP MEDICINE | Facility: MEDICAL CENTER | Age: 47
End: 2022-04-11

## 2022-04-19 ENCOUNTER — TELEPHONE (OUTPATIENT)
Dept: CARDIOLOGY | Facility: MEDICAL CENTER | Age: 47
End: 2022-04-19

## 2022-04-19 NOTE — TELEPHONE ENCOUNTER
Spoke with patient In regards to his labs taht were order on his last visit with NNEKA Alvarado. Patient stated that he will be having labs done prior to his appointment.

## 2022-04-22 ENCOUNTER — HOSPITAL ENCOUNTER (OUTPATIENT)
Dept: LAB | Facility: MEDICAL CENTER | Age: 47
End: 2022-04-22
Attending: NURSE PRACTITIONER
Payer: COMMERCIAL

## 2022-04-22 DIAGNOSIS — Z79.899 HIGH RISK MEDICATION USE: ICD-10-CM

## 2022-04-22 DIAGNOSIS — I10 ESSENTIAL HYPERTENSION: ICD-10-CM

## 2022-04-22 DIAGNOSIS — E66.01 MORBID OBESITY (HCC): ICD-10-CM

## 2022-04-22 DIAGNOSIS — I50.810 RIGHT HEART FAILURE WITH PRESERVED RIGHT VENTRICULAR FUNCTION (HCC): ICD-10-CM

## 2022-04-22 DIAGNOSIS — N17.9 AKI (ACUTE KIDNEY INJURY) (HCC): ICD-10-CM

## 2022-04-22 LAB
ANION GAP SERPL CALC-SCNC: 10 MMOL/L (ref 7–16)
BUN SERPL-MCNC: 18 MG/DL (ref 8–22)
CALCIUM SERPL-MCNC: 10.2 MG/DL (ref 8.5–10.5)
CHLORIDE SERPL-SCNC: 97 MMOL/L (ref 96–112)
CO2 SERPL-SCNC: 29 MMOL/L (ref 20–33)
CREAT SERPL-MCNC: 1.1 MG/DL (ref 0.5–1.4)
GFR SERPLBLD CREATININE-BSD FMLA CKD-EPI: 84 ML/MIN/1.73 M 2
GLUCOSE SERPL-MCNC: 92 MG/DL (ref 65–99)
POTASSIUM SERPL-SCNC: 4 MMOL/L (ref 3.6–5.5)
SODIUM SERPL-SCNC: 136 MMOL/L (ref 135–145)

## 2022-04-22 PROCEDURE — 36415 COLL VENOUS BLD VENIPUNCTURE: CPT

## 2022-04-22 PROCEDURE — 80048 BASIC METABOLIC PNL TOTAL CA: CPT

## 2022-04-25 ENCOUNTER — TELEMEDICINE (OUTPATIENT)
Dept: CARDIOLOGY | Facility: MEDICAL CENTER | Age: 47
End: 2022-04-25
Payer: COMMERCIAL

## 2022-04-25 ENCOUNTER — TELEPHONE (OUTPATIENT)
Dept: CARDIOLOGY | Facility: MEDICAL CENTER | Age: 47
End: 2022-04-25

## 2022-04-25 VITALS
HEART RATE: 90 BPM | WEIGHT: 315 LBS | SYSTOLIC BLOOD PRESSURE: 136 MMHG | HEIGHT: 64 IN | DIASTOLIC BLOOD PRESSURE: 78 MMHG | BODY MASS INDEX: 53.78 KG/M2

## 2022-04-25 DIAGNOSIS — I50.810 RIGHT HEART FAILURE WITH PRESERVED RIGHT VENTRICULAR FUNCTION (HCC): ICD-10-CM

## 2022-04-25 DIAGNOSIS — G47.33 OBSTRUCTIVE SLEEP APNEA: ICD-10-CM

## 2022-04-25 DIAGNOSIS — E66.01 MORBID OBESITY (HCC): ICD-10-CM

## 2022-04-25 DIAGNOSIS — Z79.899 HIGH RISK MEDICATION USE: ICD-10-CM

## 2022-04-25 DIAGNOSIS — N17.9 AKI (ACUTE KIDNEY INJURY) (HCC): ICD-10-CM

## 2022-04-25 PROCEDURE — 99214 OFFICE O/P EST MOD 30 MIN: CPT | Mod: 95 | Performed by: NURSE PRACTITIONER

## 2022-04-25 RX ORDER — FUROSEMIDE 40 MG/1
40 TABLET ORAL 2 TIMES DAILY
Qty: 180 TABLET | Refills: 3 | Status: SHIPPED | OUTPATIENT
Start: 2022-04-25 | End: 2022-06-27

## 2022-04-25 RX ORDER — LISINOPRIL AND HYDROCHLOROTHIAZIDE 25; 20 MG/1; MG/1
1 TABLET ORAL DAILY
Qty: 90 TABLET | Refills: 3 | Status: SHIPPED | OUTPATIENT
Start: 2022-04-25 | End: 2022-07-27

## 2022-04-25 RX ORDER — SPIRONOLACTONE 25 MG/1
25 TABLET ORAL DAILY
Qty: 90 TABLET | Refills: 3 | Status: SHIPPED | OUTPATIENT
Start: 2022-04-25 | End: 2022-07-27 | Stop reason: SDUPTHER

## 2022-04-25 RX ORDER — AMLODIPINE BESYLATE 10 MG/1
10 TABLET ORAL DAILY
Qty: 90 TABLET | Refills: 3 | Status: SHIPPED | OUTPATIENT
Start: 2022-04-25 | End: 2022-07-27 | Stop reason: SDUPTHER

## 2022-04-25 RX ORDER — POTASSIUM CHLORIDE 20 MEQ/1
20 TABLET, EXTENDED RELEASE ORAL 2 TIMES DAILY
Qty: 180 TABLET | Refills: 3 | Status: SHIPPED | OUTPATIENT
Start: 2022-04-25 | End: 2022-07-27 | Stop reason: SDUPTHER

## 2022-04-25 RX ORDER — CARVEDILOL 12.5 MG/1
12.5 TABLET ORAL 2 TIMES DAILY WITH MEALS
Qty: 180 TABLET | Refills: 3 | Status: SHIPPED | OUTPATIENT
Start: 2022-04-25 | End: 2022-06-27

## 2022-04-25 ASSESSMENT — ENCOUNTER SYMPTOMS
INSOMNIA: 0
NERVOUS/ANXIOUS: 0
CLAUDICATION: 0
SHORTNESS OF BREATH: 1
ORTHOPNEA: 0
PALPITATIONS: 0
ABDOMINAL PAIN: 0
COUGH: 0
FEVER: 0
MYALGIAS: 0
DIZZINESS: 0
PND: 0

## 2022-04-25 ASSESSMENT — FIBROSIS 4 INDEX: FIB4 SCORE: 0.91

## 2022-04-25 NOTE — PROGRESS NOTES
Chief Complaint   Patient presents with   • Congestive Heart Failure     F/V DX:  Right heart failure with preserved right ventricular function         Subjective:   This evaluation was conducted via Zoom using secure and encrypted videoconferencing technology. The patient was in their home in the [facility].    The patient's identity was confirmed and verbal consent was obtained for this virtual visit.    Shelli Moreira is a 46 y.o. male who presents today for follow-up on his heart failure.    Current patient of the Heart failure clinic.  He was last seen in clinic on 1/24/2022.  During that visit, no changes remain.  Patient was recommended to get lab testing done.  Patient was also pending a sleep study follow-up and recommendations.    Patient recommended to start sleep apnea machine.  He has been having difficulty getting the device, but states he should be able to receive it soon.    Patient reports he has been doing fairly well.  He mentions his edema has improved, he does continue to have episodes of shortness of breath.  He denies chest pain, palpitations, orthopnea, PND or dizziness/lightheadedness.    A cold has been going around his house so he is a little congested today.    He reports his weights are up to 354 pounds.    He continues to use oxygen at night.    He reports trying to limit his salt intake.      Patient hoping to get a new job by 6 months and moved to the [facility].    Additonally, patient has the following medical problems:    -hospitalization from 6/18/2021 through 6/23/2021.  Patient had presented with shortness of breath and weight gain.  He was a transfer from [facility].  Patient was found to have acute hypoxemic respiratory failure.  An echo was performed and patient was found to have right-sided heart failure.  Patient has a history of sleep apnea and obesity.  Likely his CPAP is not as effective.  Patient diuresed and discharged home.  Patient recommended  to follow-up for repeat sleep study    -Hypertension    -Sleep apnea    Past Medical History:   Diagnosis Date   • Asthma    • Congestive heart failure (HCC)    • Hypertension    • Sleep apnea      Past Surgical History:   Procedure Laterality Date   • KNEE ARTHROSCOPY Left 2009     Family History   Problem Relation Age of Onset   • Diabetes Mother    • No Known Problems Sister      Social History     Socioeconomic History   • Marital status:      Spouse name: Not on file   • Number of children: Not on file   • Years of education: Not on file   • Highest education level: Not on file   Occupational History   • Not on file   Tobacco Use   • Smoking status: Never Smoker   • Smokeless tobacco: Never Used   Vaping Use   • Vaping Use: Every day   • Substances: THC   • Devices: Disposable   Substance and Sexual Activity   • Alcohol use: Yes     Comment: rare    • Drug use: Yes     Types: Inhaled     Comment: THC   • Sexual activity: Not on file   Other Topics Concern   • Not on file   Social History Narrative   • Not on file     Social Determinants of Health     Financial Resource Strain: Not on file   Food Insecurity: Not on file   Transportation Needs: Not on file   Physical Activity: Not on file   Stress: Not on file   Social Connections: Not on file   Intimate Partner Violence: Not on file   Housing Stability: Not on file     No Known Allergies  Outpatient Encounter Medications as of 4/25/2022   Medication Sig Dispense Refill   • spironolactone (ALDACTONE) 25 MG Tab take 1 tablet by mouth once daily 30 Tablet 3   • metFORMIN (GLUCOPHAGE) 500 MG Tab Take 1 Tablet by mouth 2 times a day with meals. 180 Tablet 3   • furosemide (LASIX) 40 MG Tab Take 1-2 Tablets by mouth 2 times a day. Take 80 mg in AM and 40 mg in afternoon 90 Tablet 5   • potassium chloride SA (KDUR) 20 MEQ Tab CR Take 1 Tablet by mouth 2 times a day. 60 Tablet 5   • carvedilol (COREG) 12.5 MG Tab Take 1 Tablet by mouth 2 times a day with meals.  "60 Tablet 11   • fluticasone (FLONASE) 50 MCG/ACT nasal spray Administer 2 Sprays into affected nostril(S) every day.     • tiotropium (SPIRIVA) 18 MCG Cap Place 18 mcg into inhaler and inhale every day.     • lisinopril-hydrochlorothiazide (PRINZIDE) 20-25 MG per tablet Take 1 tablet by mouth every day.     • amLODIPine (NORVASC) 10 MG Tab Take 10 mg by mouth every day.       No facility-administered encounter medications on file as of 4/25/2022.     Review of Systems   Constitutional: Positive for malaise/fatigue. Negative for fever.   HENT: Positive for congestion.    Respiratory: Positive for shortness of breath. Negative for cough.    Cardiovascular: Positive for leg swelling. Negative for chest pain, palpitations, orthopnea, claudication and PND.   Gastrointestinal: Negative for abdominal pain.   Musculoskeletal: Negative for myalgias.   Neurological: Negative for dizziness.   Psychiatric/Behavioral: The patient is not nervous/anxious and does not have insomnia.    All other systems reviewed and are negative.       Objective:   /78 (BP Location: Right arm, Patient Position: Sitting, BP Cuff Size: Adult)   Pulse 90   Ht 1.626 m (5' 4\")   Wt (!) 161 kg (354 lb)   BMI 60.76 kg/m²     Physical Exam  Constitutional:       Appearance: He is well-developed. He is morbidly obese.   HENT:      Head: Normocephalic and atraumatic.   Eyes:      General: Lids are normal.   Pulmonary:      Effort: Pulmonary effort is normal.   Musculoskeletal:      Cervical back: Normal range of motion.   Neurological:      Mental Status: He is oriented to person, place, and time.   Psychiatric:         Speech: Speech normal.         Behavior: Behavior normal.         Thought Content: Thought content normal.         Judgment: Judgment normal.       Lab Results   Component Value Date/Time    CHOLSTRLTOT 110 06/19/2021 12:45 AM    LDL 51 06/19/2021 12:45 AM    HDL 44 06/19/2021 12:45 AM    TRIGLYCERIDE 73 06/19/2021 12:45 AM     "   Lab Results   Component Value Date/Time    SODIUM 136 04/22/2022 11:17 AM    POTASSIUM 4.0 04/22/2022 11:17 AM    CHLORIDE 97 04/22/2022 11:17 AM    CO2 29 04/22/2022 11:17 AM    GLUCOSE 92 04/22/2022 11:17 AM    BUN 18 04/22/2022 11:17 AM    CREATININE 1.10 04/22/2022 11:17 AM     Lab Results   Component Value Date/Time    ALKPHOSPHAT 63 06/20/2021 12:04 AM    ASTSGOT 22 06/20/2021 12:04 AM    ALTSGPT 24 06/20/2021 12:04 AM    TBILIRUBIN 0.8 06/20/2021 12:04 AM      Transthoracic Echo Report 6/18/2021  No prior study is available for comparison.   Normal left ventricular systolic function.   No evidence of valvular abnormality based on Doppler evaluation.   Severely dilated right ventricle. Normal right ventricular systolic   function.  Unable to estimate pulmonary artery pressure due to an inadequate   tricuspid regurgitant jet    Assessment:     1. High risk medication use  Comp Metabolic Panel   2. Right heart failure with preserved right ventricular function (HCC)  Comp Metabolic Panel   3. SHARITA (acute kidney injury) ([facility])  Comp Metabolic Panel   4. Obstructive sleep apnea     5. Morbid obesity ([facility])         Medical Decision Making:  Today's Assessment / Status / Plan:   1.  Right-sided heart failure versus, obesity hypoventilation syndrome:  -Patient reports his edema comes and goes related to his salt intake.  -He understands low-sodium diet, encourage patient to stay on low-sodium diet and watch his portions.  -Continue furosemide 40 mg BID, pt reports he has been taking this dose and has been stable   -Continue spironolactone 25 mg daily  -Continue potassium 20 mEq twice a day  -CMP in 6 months  -Reinforced s/sx of worsening symptoms with patient and weight monitoring. Pt verbalizes understanding. Pt to call office or RTC if present.   -Continue low-sodium diet when able    2.  Sleep apnea:  -Patient to start treatment as soon as he receives his device  -Continue oxygen for now    3.  Obesity: BMI  60.76  -Encouraged weight loss, discussed trying to lose 15 to 20 pounds by follow-up  -Discussed importance of weight loss    4.  Hypertension: Stable  -Continue amlodipine 10 mg daily  -Continue carvedilol 12.5 mg twice a day  -Continue lisinopril-hydrochlorothiazide 20-25 mg daily    5.  Respiratory failure:  -Continue oxygen at 3 L at night    FU in clinic in 6 months with labs. Sooner if needed.    Patient verbalizes understanding and agrees with the plan of care.     PLEASE NOTE: This Note was created using voice recognition Software. I have made every reasonable attempt to correct obvious errors, but I expect that there are errors of grammar and possibly content that I did not discover before finalizing the note

## 2022-04-25 NOTE — TELEPHONE ENCOUNTER
----- Message from Eduar Galaviz R.N. sent at 4/25/2022 12:51 PM PDT -----  To MA-No abnormalities of concern. No changes to plan of care. Continue to follow up as scheduled. TY

## 2022-04-25 NOTE — LETTER
April 25, 2022        Shelli Moreira  [address] [address] [id number]          Dear Shelli,    We have received the results of your recent:    Lab Work     Your test came back within normal limits. No abnormalities of concern.Continue to follow up as scheduled. Please follow up as previously discussed with your physician.      Feel free to call us with any questions.        Sincerely,          Kirsten Alanis Ass't APRROSANNA Alvarado  Electronically Signed

## 2022-06-01 ENCOUNTER — NON-PROVIDER VISIT (OUTPATIENT)
Dept: OCCUPATIONAL MEDICINE | Facility: CLINIC | Age: 47
End: 2022-06-01

## 2022-06-01 DIAGNOSIS — Z02.1 PRE-EMPLOYMENT DRUG SCREENING: ICD-10-CM

## 2022-06-01 DIAGNOSIS — Z02.1 PRE-EMPLOYMENT HEALTH SCREENING EXAMINATION: ICD-10-CM

## 2022-06-01 LAB
AMP AMPHETAMINE: NORMAL
COC COCAINE: NORMAL
INT CON NEG: NORMAL
INT CON POS: NORMAL
MET METHAMPHETAMINES: NORMAL
OPI OPIATES: NORMAL
PCP PHENCYCLIDINE: NORMAL
POC DRUG COMMENT 753798-OCCUPATIONAL HEALTH: NORMAL
THC: NORMAL

## 2022-06-01 PROCEDURE — 80305 DRUG TEST PRSMV DIR OPT OBS: CPT | Performed by: PREVENTIVE MEDICINE

## 2022-06-01 PROCEDURE — 8911 PR MRO FEE: Performed by: PREVENTIVE MEDICINE

## 2022-06-08 ENCOUNTER — NON-PROVIDER VISIT (OUTPATIENT)
Dept: OCCUPATIONAL MEDICINE | Facility: CLINIC | Age: 47
End: 2022-06-08

## 2022-06-08 DIAGNOSIS — Z02.89 VISIT FOR OCCUPATIONAL HEALTH EXAMINATION: ICD-10-CM

## 2022-06-08 PROCEDURE — 8911 PR MRO FEE: Performed by: NURSE PRACTITIONER

## 2022-06-27 ENCOUNTER — OFFICE VISIT (OUTPATIENT)
Dept: CARDIOLOGY | Facility: MEDICAL CENTER | Age: 47
End: 2022-06-27
Payer: COMMERCIAL

## 2022-06-27 VITALS
RESPIRATION RATE: 16 BRPM | HEIGHT: 64 IN | WEIGHT: 315 LBS | OXYGEN SATURATION: 98 % | DIASTOLIC BLOOD PRESSURE: 92 MMHG | SYSTOLIC BLOOD PRESSURE: 146 MMHG | BODY MASS INDEX: 53.78 KG/M2 | HEART RATE: 101 BPM

## 2022-06-27 DIAGNOSIS — I10 HTN (HYPERTENSION), MALIGNANT: ICD-10-CM

## 2022-06-27 DIAGNOSIS — E66.01 MORBID OBESITY (HCC): ICD-10-CM

## 2022-06-27 DIAGNOSIS — R06.09 DYSPNEA ON EXERTION: ICD-10-CM

## 2022-06-27 DIAGNOSIS — I51.89 RIGHT VENTRICULAR DYSFUNCTION, NYHA CLASS 3: ICD-10-CM

## 2022-06-27 DIAGNOSIS — Z79.899 HIGH RISK MEDICATION USE: ICD-10-CM

## 2022-06-27 DIAGNOSIS — I27.20 PULMONARY HYPERTENSION (HCC): ICD-10-CM

## 2022-06-27 DIAGNOSIS — G47.33 OBSTRUCTIVE SLEEP APNEA: ICD-10-CM

## 2022-06-27 PROCEDURE — 99215 OFFICE O/P EST HI 40 MIN: CPT | Performed by: INTERNAL MEDICINE

## 2022-06-27 RX ORDER — TORSEMIDE 100 MG/1
100 TABLET ORAL DAILY
Qty: 30 TABLET | Refills: 3 | Status: SHIPPED | OUTPATIENT
Start: 2022-06-27 | End: 2022-07-27 | Stop reason: SDUPTHER

## 2022-06-27 RX ORDER — EMPAGLIFLOZIN 10 MG/1
10 TABLET, FILM COATED ORAL DAILY
Qty: 90 TABLET | Refills: 3 | Status: SHIPPED | OUTPATIENT
Start: 2022-06-27 | End: 2022-07-27 | Stop reason: SDUPTHER

## 2022-06-27 RX ORDER — CARVEDILOL 25 MG/1
25 TABLET ORAL 2 TIMES DAILY WITH MEALS
Qty: 180 TABLET | Refills: 3 | Status: SHIPPED | OUTPATIENT
Start: 2022-06-27 | End: 2022-07-27 | Stop reason: SDUPTHER

## 2022-06-27 ASSESSMENT — ENCOUNTER SYMPTOMS
DIAPHORESIS: 0
BRUISES/BLEEDS EASILY: 0
SHORTNESS OF BREATH: 1
DIZZINESS: 0
HEADACHES: 0
MYALGIAS: 0
PALPITATIONS: 0
BLURRED VISION: 0
ABDOMINAL PAIN: 0
DEPRESSION: 0
SENSORY CHANGE: 0
COUGH: 0
DOUBLE VISION: 0
FEVER: 0
MEMORY LOSS: 0
FALLS: 0

## 2022-06-27 ASSESSMENT — FIBROSIS 4 INDEX: FIB4 SCORE: 0.91

## 2022-06-27 NOTE — PATIENT INSTRUCTIONS
Will increase Carvedilol to 25 mg po twice daily.    Will start Jardiance 10 mg daily.    Will stop Furosemide.    Will start Torsemide 100 mg daily.

## 2022-06-27 NOTE — PROGRESS NOTES
Chief Complaint   Patient presents with   • Hypertension       Subjective     Shelli Moreira is a 46 y.o. male who presents today for cardiac care and management due to persistent exertional dyspnea in setting of morbid obesity, pulmonary hypertension, hypertension.    Patient still gets winded with daily living activities and exertion. No symptoms at rest.    Patient did have a transthoracic echocardiogram in June 2021 which showed normal LV function, no significant valvular disease, severely dilated right ventricle.  No RVSP measurement.  I personally interpreted images.    I personally interpreted blood test result which showed prior elevated NT proBNP of 2200, normal GFR, potassium of 4.    Past Medical History:   Diagnosis Date   • Asthma    • Congestive heart failure (HCC)    • Hypertension    • Sleep apnea      Past Surgical History:   Procedure Laterality Date   • KNEE ARTHROSCOPY Left 2009     Family History   Problem Relation Age of Onset   • Diabetes Mother    • No Known Problems Sister      Social History     Socioeconomic History   • Marital status:      Spouse name: Not on file   • Number of children: Not on file   • Years of education: Not on file   • Highest education level: Not on file   Occupational History   • Not on file   Tobacco Use   • Smoking status: Never Smoker   • Smokeless tobacco: Never Used   Vaping Use   • Vaping Use: Every day   • Substances: THC   • Devices: Disposable   Substance and Sexual Activity   • Alcohol use: Yes     Comment: rare    • Drug use: Yes     Types: Inhaled     Comment: THC   • Sexual activity: Not on file   Other Topics Concern   • Not on file   Social History Narrative   • Not on file     Social Determinants of Health     Financial Resource Strain: Not on file   Food Insecurity: Not on file   Transportation Needs: Not on file   Physical Activity: Not on file   Stress: Not on file   Social Connections: Not on file   Intimate Partner Violence: Not on file    Housing Stability: Not on file     No Known Allergies  Outpatient Encounter Medications as of 6/27/2022   Medication Sig Dispense Refill   • torsemide (DEMADEX) 100 MG Tab Take 1 Tablet by mouth every day. 30 Tablet 3   • Empagliflozin (JARDIANCE) 10 MG Tab Take 1 Tablet by mouth every day. 90 Tablet 3   • carvedilol (COREG) 25 MG Tab Take 1 Tablet by mouth 2 times a day with meals. 180 Tablet 3   • spironolactone (ALDACTONE) 25 MG Tab Take 1 Tablet by mouth every day. 90 Tablet 3   • potassium chloride SA (KDUR) 20 MEQ Tab CR Take 1 Tablet by mouth 2 times a day. 180 Tablet 3   • lisinopril-hydrochlorothiazide (PRINZIDE) 20-25 MG per tablet Take 1 Tablet by mouth every day. 90 Tablet 3   • amLODIPine (NORVASC) 10 MG Tab Take 1 Tablet by mouth every day. 90 Tablet 3   • metFORMIN (GLUCOPHAGE) 500 MG Tab Take 1 Tablet by mouth 2 times a day with meals. 180 Tablet 3   • fluticasone (FLONASE) 50 MCG/ACT nasal spray Administer 2 Sprays into affected nostril(S) every day.     • tiotropium (SPIRIVA) 18 MCG Cap Place 18 mcg into inhaler and inhale every day.     • [DISCONTINUED] carvedilol (COREG) 12.5 MG Tab Take 1 Tablet by mouth 2 times a day with meals. 180 Tablet 3   • [DISCONTINUED] furosemide (LASIX) 40 MG Tab Take 1 Tablet by mouth 2 times a day. 180 Tablet 3     No facility-administered encounter medications on file as of 6/27/2022.     Review of Systems   Constitutional: Negative for diaphoresis and fever.   HENT: Negative for nosebleeds.    Eyes: Negative for blurred vision and double vision.   Respiratory: Positive for shortness of breath. Negative for cough.    Cardiovascular: Negative for chest pain and palpitations.   Gastrointestinal: Negative for abdominal pain.   Genitourinary: Negative for dysuria and frequency.   Musculoskeletal: Negative for falls and myalgias.   Skin: Negative for rash.   Neurological: Negative for dizziness, sensory change and headaches.   Endo/Heme/Allergies: Does not bruise/bleed  "easily.   Psychiatric/Behavioral: Negative for depression and memory loss.              Objective     BP (!) 146/92 (BP Location: Left arm, Patient Position: Sitting, BP Cuff Size: Adult)   Pulse (!) 101   Resp 16   Ht 1.626 m (5' 4\")   Wt (!) 168 kg (370 lb)   SpO2 98%   BMI 63.51 kg/m²     Physical Exam  Vitals and nursing note reviewed.   Constitutional:       General: He is not in acute distress.     Appearance: He is not diaphoretic.   HENT:      Head: Normocephalic and atraumatic.      Right Ear: External ear normal.      Left Ear: External ear normal.      Nose: No congestion or rhinorrhea.   Eyes:      General:         Right eye: No discharge.         Left eye: No discharge.   Neck:      Thyroid: No thyromegaly.      Vascular: No JVD.   Cardiovascular:      Rate and Rhythm: Normal rate and regular rhythm.      Pulses: Normal pulses.   Pulmonary:      Effort: No respiratory distress.   Abdominal:      General: There is no distension.      Tenderness: There is no abdominal tenderness.   Musculoskeletal:         General: No swelling or tenderness.      Right lower leg: Edema present.      Left lower leg: Edema present.   Skin:     General: Skin is warm and dry.   Neurological:      Mental Status: He is alert and oriented to person, place, and time.      Cranial Nerves: No cranial nerve deficit.   Psychiatric:         Behavior: Behavior normal.                Assessment & Plan     1. Pulmonary hypertension (HCC)  Empagliflozin (JARDIANCE) 10 MG Tab    Basic Metabolic Panel    proBrain Natriuretic Peptide, NT   2. Right ventricular dysfunction, NYHA class 3  Empagliflozin (JARDIANCE) 10 MG Tab    Basic Metabolic Panel    proBrain Natriuretic Peptide, NT   3. Obstructive sleep apnea     4. Morbid obesity (HCC)     5. HTN (hypertension), malignant     6. Dyspnea on exertion  Empagliflozin (JARDIANCE) 10 MG Tab    Basic Metabolic Panel    proBrain Natriuretic Peptide, NT   7. High risk medication use   "       Medical Decision Making: Today's Assessment/Status/Plan:   Overall, based on his symptomatology of heart failure along with elevated NT proBNP, patient does meet criteria to be diagnosed with heart failure with preserved ejection fraction at this time.  Therefore, we will modify his overall regimen to optimize treatment for this entity.    Still volume overloaded with legs swelling.    Will stop Furosemide.    Will start Torsemide 100 mg daily.    Blood pressure is high. Will increase Carvedilol to 25 mg po twice daily.    Based on recent data on SGLT2 and heart failure, patient will be benefited from Jardiance 10 mg p.o. once a day for further reduction in mortality and hospitalization.  Therefore, I will start patient on Jardiance 10 mg p.o. once a day.  Risks and benefits were explained to patient and patient has agreed to proceed. FDA approved.    Based on the overall clinical history and profile, patient is a good candidate for Cardiomems implantation system to remotely monitor intracardiac pressures. he will benefit from reduced recurrent hospitalization for heart failure exacerbation and also quality of life improvement. Will see if patient could be compliant with follow ups and medications...     In the meantime, we will optimize BRANDEN treatment with sleep clinic.    Optimize weight loss.

## 2022-07-01 ENCOUNTER — TELEPHONE (OUTPATIENT)
Dept: CARDIOLOGY | Facility: MEDICAL CENTER | Age: 47
End: 2022-07-01
Payer: COMMERCIAL

## 2022-07-01 NOTE — TELEPHONE ENCOUNTER
ROXANNA BULL (Key: [id number])Need help? Call us at [phone]  Status  Sent to [employer]  Next Steps  The plan will fax you a determination, typically within 1 to 5 business days.    How do I follow up?  Drug  Jardiance 10MG tablets  Form  ProAct General Prior Authorization Form  Prior Authorization Requests for Prescription Medications  [phone]  [phone]

## 2022-07-13 ENCOUNTER — TELEPHONE (OUTPATIENT)
Dept: CARDIOLOGY | Facility: MEDICAL CENTER | Age: 47
End: 2022-07-13
Payer: COMMERCIAL

## 2022-07-13 NOTE — TELEPHONE ENCOUNTER
Called pt in regards to lab work that was ordered at previous OV. LVM for call back to see if the labs were completed somewhere outside of [facility]. Pt has follow up appointment scheduled with Dr. Childs

## 2022-07-26 ENCOUNTER — HOSPITAL ENCOUNTER (OUTPATIENT)
Dept: LAB | Facility: MEDICAL CENTER | Age: 47
End: 2022-07-26
Attending: INTERNAL MEDICINE
Payer: COMMERCIAL

## 2022-07-26 ENCOUNTER — HOSPITAL ENCOUNTER (OUTPATIENT)
Dept: LAB | Facility: MEDICAL CENTER | Age: 47
End: 2022-07-26
Attending: NURSE PRACTITIONER
Payer: COMMERCIAL

## 2022-07-26 DIAGNOSIS — N17.9 AKI (ACUTE KIDNEY INJURY) (HCC): ICD-10-CM

## 2022-07-26 DIAGNOSIS — I51.89 RIGHT VENTRICULAR DYSFUNCTION, NYHA CLASS 3: ICD-10-CM

## 2022-07-26 DIAGNOSIS — I27.20 PULMONARY HYPERTENSION (HCC): ICD-10-CM

## 2022-07-26 DIAGNOSIS — Z79.899 HIGH RISK MEDICATION USE: ICD-10-CM

## 2022-07-26 DIAGNOSIS — I50.810 RIGHT HEART FAILURE WITH PRESERVED RIGHT VENTRICULAR FUNCTION (HCC): ICD-10-CM

## 2022-07-26 DIAGNOSIS — R06.09 DYSPNEA ON EXERTION: ICD-10-CM

## 2022-07-26 LAB
ALBUMIN SERPL BCP-MCNC: 4.2 G/DL (ref 3.2–4.9)
ALBUMIN/GLOB SERPL: 1.1 G/DL
ALP SERPL-CCNC: 85 U/L (ref 30–99)
ALT SERPL-CCNC: 17 U/L (ref 2–50)
ANION GAP SERPL CALC-SCNC: 10 MMOL/L (ref 7–16)
ANION GAP SERPL CALC-SCNC: 12 MMOL/L (ref 7–16)
AST SERPL-CCNC: 24 U/L (ref 12–45)
BILIRUB SERPL-MCNC: 0.4 MG/DL (ref 0.1–1.5)
BUN SERPL-MCNC: 13 MG/DL (ref 8–22)
BUN SERPL-MCNC: 13 MG/DL (ref 8–22)
CALCIUM SERPL-MCNC: 9.7 MG/DL (ref 8.5–10.5)
CALCIUM SERPL-MCNC: 9.8 MG/DL (ref 8.5–10.5)
CHLORIDE SERPL-SCNC: 94 MMOL/L (ref 96–112)
CHLORIDE SERPL-SCNC: 94 MMOL/L (ref 96–112)
CO2 SERPL-SCNC: 31 MMOL/L (ref 20–33)
CO2 SERPL-SCNC: 32 MMOL/L (ref 20–33)
CREAT SERPL-MCNC: 1.26 MG/DL (ref 0.5–1.4)
CREAT SERPL-MCNC: 1.27 MG/DL (ref 0.5–1.4)
GFR SERPLBLD CREATININE-BSD FMLA CKD-EPI: 70 ML/MIN/1.73 M 2
GFR SERPLBLD CREATININE-BSD FMLA CKD-EPI: 71 ML/MIN/1.73 M 2
GLOBULIN SER CALC-MCNC: 3.8 G/DL (ref 1.9–3.5)
GLUCOSE SERPL-MCNC: 100 MG/DL (ref 65–99)
GLUCOSE SERPL-MCNC: 100 MG/DL (ref 65–99)
NT-PROBNP SERPL IA-MCNC: 111 PG/ML (ref 0–125)
POTASSIUM SERPL-SCNC: 3.7 MMOL/L (ref 3.6–5.5)
POTASSIUM SERPL-SCNC: 3.9 MMOL/L (ref 3.6–5.5)
PROT SERPL-MCNC: 8 G/DL (ref 6–8.2)
SODIUM SERPL-SCNC: 136 MMOL/L (ref 135–145)
SODIUM SERPL-SCNC: 137 MMOL/L (ref 135–145)

## 2022-07-26 PROCEDURE — 36415 COLL VENOUS BLD VENIPUNCTURE: CPT

## 2022-07-26 PROCEDURE — 80053 COMPREHEN METABOLIC PANEL: CPT

## 2022-07-26 PROCEDURE — 80048 BASIC METABOLIC PNL TOTAL CA: CPT

## 2022-07-26 PROCEDURE — 83880 ASSAY OF NATRIURETIC PEPTIDE: CPT

## 2022-07-27 ENCOUNTER — OFFICE VISIT (OUTPATIENT)
Dept: CARDIOLOGY | Facility: MEDICAL CENTER | Age: 47
End: 2022-07-27
Payer: COMMERCIAL

## 2022-07-27 VITALS
OXYGEN SATURATION: 91 % | HEART RATE: 99 BPM | BODY MASS INDEX: 53.78 KG/M2 | SYSTOLIC BLOOD PRESSURE: 140 MMHG | WEIGHT: 315 LBS | DIASTOLIC BLOOD PRESSURE: 96 MMHG | RESPIRATION RATE: 16 BRPM | HEIGHT: 64 IN

## 2022-07-27 DIAGNOSIS — E66.01 MORBID OBESITY (HCC): ICD-10-CM

## 2022-07-27 DIAGNOSIS — I27.20 PULMONARY HYPERTENSION (HCC): ICD-10-CM

## 2022-07-27 DIAGNOSIS — I50.810 RIGHT HEART FAILURE WITH PRESERVED RIGHT VENTRICULAR FUNCTION (HCC): ICD-10-CM

## 2022-07-27 DIAGNOSIS — R06.09 DYSPNEA ON EXERTION: ICD-10-CM

## 2022-07-27 DIAGNOSIS — G47.33 OBSTRUCTIVE SLEEP APNEA: ICD-10-CM

## 2022-07-27 DIAGNOSIS — Z79.899 HIGH RISK MEDICATION USE: ICD-10-CM

## 2022-07-27 DIAGNOSIS — I10 HTN (HYPERTENSION), MALIGNANT: ICD-10-CM

## 2022-07-27 DIAGNOSIS — I51.89 RIGHT VENTRICULAR DYSFUNCTION, NYHA CLASS 3: ICD-10-CM

## 2022-07-27 PROCEDURE — 99214 OFFICE O/P EST MOD 30 MIN: CPT | Performed by: INTERNAL MEDICINE

## 2022-07-27 RX ORDER — LOSARTAN POTASSIUM 100 MG/1
100 TABLET ORAL DAILY
Qty: 90 TABLET | Refills: 3 | Status: SHIPPED | OUTPATIENT
Start: 2022-07-27 | End: 2023-08-07

## 2022-07-27 RX ORDER — AMLODIPINE BESYLATE 10 MG/1
10 TABLET ORAL DAILY
Qty: 90 TABLET | Refills: 3 | Status: SHIPPED | OUTPATIENT
Start: 2022-07-27 | End: 2022-07-27 | Stop reason: SDUPTHER

## 2022-07-27 RX ORDER — LOSARTAN POTASSIUM 100 MG/1
100 TABLET ORAL DAILY
Qty: 90 TABLET | Refills: 3 | Status: SHIPPED | OUTPATIENT
Start: 2022-07-27 | End: 2022-07-27 | Stop reason: SDUPTHER

## 2022-07-27 RX ORDER — EMPAGLIFLOZIN 10 MG/1
10 TABLET, FILM COATED ORAL DAILY
Qty: 90 TABLET | Refills: 3 | Status: SHIPPED | OUTPATIENT
Start: 2022-07-27 | End: 2023-08-15

## 2022-07-27 RX ORDER — POTASSIUM CHLORIDE 20 MEQ/1
20 TABLET, EXTENDED RELEASE ORAL 2 TIMES DAILY
Qty: 180 TABLET | Refills: 3 | Status: SHIPPED | OUTPATIENT
Start: 2022-07-27 | End: 2023-08-30 | Stop reason: SDUPTHER

## 2022-07-27 RX ORDER — POTASSIUM CHLORIDE 20 MEQ/1
20 TABLET, EXTENDED RELEASE ORAL 2 TIMES DAILY
Qty: 180 TABLET | Refills: 3 | Status: SHIPPED | OUTPATIENT
Start: 2022-07-27 | End: 2022-07-27 | Stop reason: SDUPTHER

## 2022-07-27 RX ORDER — CARVEDILOL 25 MG/1
25 TABLET ORAL 2 TIMES DAILY WITH MEALS
Qty: 180 TABLET | Refills: 3 | Status: SHIPPED | OUTPATIENT
Start: 2022-07-27 | End: 2023-10-17

## 2022-07-27 RX ORDER — CARVEDILOL 25 MG/1
25 TABLET ORAL 2 TIMES DAILY WITH MEALS
Qty: 180 TABLET | Refills: 3 | Status: SHIPPED | OUTPATIENT
Start: 2022-07-27 | End: 2022-07-27 | Stop reason: SDUPTHER

## 2022-07-27 RX ORDER — AMLODIPINE BESYLATE 10 MG/1
10 TABLET ORAL DAILY
Qty: 90 TABLET | Refills: 3 | Status: SHIPPED | OUTPATIENT
Start: 2022-07-27 | End: 2023-08-31

## 2022-07-27 RX ORDER — TORSEMIDE 100 MG/1
100 TABLET ORAL DAILY
Qty: 90 TABLET | Refills: 3 | Status: SHIPPED | OUTPATIENT
Start: 2022-07-27 | End: 2023-08-01

## 2022-07-27 RX ORDER — SPIRONOLACTONE 25 MG/1
25 TABLET ORAL DAILY
Qty: 90 TABLET | Refills: 3 | Status: SHIPPED | OUTPATIENT
Start: 2022-07-27 | End: 2022-10-25

## 2022-07-27 ASSESSMENT — MINNESOTA LIVING WITH HEART FAILURE QUESTIONNAIRE (MLHF)
GIVING YOU SIDE EFFECTS FROM TREATMENTS: 0
WALKING ABOUT OR CLIMBING STAIRS DIFFICULT: 2
MAKING YOU STAY IN A HOSPITAL: 0
DIFFICULTY GOING AWAY FROM HOME: 0
MAKING YOU FEEL DEPRESSED: 0
TOTAL_SCORE: 22
MAKING YOU SHORT OF BREATH: 2
TIRED, FATIGUED OR LOW ON ENERGY: 3
HAVING TO SIT OR LIE DOWN DURING THE DAY: 1
DIFFICULTY TO CONCENTRATE OR REMEMBERING THINGS: 1
LOSS OF SELF CONTROL IN YOUR LIFE: 0
WORKING AROUND THE HOUSE OR YARD DIFFICULT: 1
EATING LESS FOODS YOU LIKE: 1
COSTING YOU MONEY FOR MEDICAL CARE: 1
DIFFICULTY SLEEPING WELL AT NIGHT: 3
DIFFICULTY WORKING TO EARN A LIVING: 1
FEELING LIKE A BURDEN TO FAMILY AND FRIENDS: 0
DIFFICULTY SOCIALIZING WITH FAMILY OR FRIENDS: 1
DIFFICULTY WITH SEXUAL ACTIVITIES: 1
DIFFICULTY WITH RECREATIONAL PASTIMES, SPORTS, HOBBIES: 1
SWELLING IN ANKLES OR LEGS: 1
MAKING YOU WORRY: 2

## 2022-07-27 ASSESSMENT — ENCOUNTER SYMPTOMS
FALLS: 0
BLURRED VISION: 0
MEMORY LOSS: 0
DIAPHORESIS: 0
COUGH: 0
DIZZINESS: 0
SHORTNESS OF BREATH: 1
BRUISES/BLEEDS EASILY: 0
HEADACHES: 0
ABDOMINAL PAIN: 0
FEVER: 0
MYALGIAS: 0
SENSORY CHANGE: 0
DOUBLE VISION: 0
PALPITATIONS: 0
DEPRESSION: 0

## 2022-07-27 ASSESSMENT — FIBROSIS 4 INDEX: FIB4 SCORE: 1.18

## 2022-10-25 ENCOUNTER — OFFICE VISIT (OUTPATIENT)
Dept: CARDIOLOGY | Facility: MEDICAL CENTER | Age: 47
End: 2022-10-25
Payer: COMMERCIAL

## 2022-10-25 VITALS
DIASTOLIC BLOOD PRESSURE: 64 MMHG | OXYGEN SATURATION: 91 % | RESPIRATION RATE: 24 BRPM | HEART RATE: 98 BPM | WEIGHT: 315 LBS | BODY MASS INDEX: 53.78 KG/M2 | HEIGHT: 64 IN | SYSTOLIC BLOOD PRESSURE: 106 MMHG

## 2022-10-25 DIAGNOSIS — I51.89 RIGHT VENTRICULAR DYSFUNCTION, NYHA CLASS 3: ICD-10-CM

## 2022-10-25 DIAGNOSIS — G47.33 OBSTRUCTIVE SLEEP APNEA: ICD-10-CM

## 2022-10-25 DIAGNOSIS — I50.810 RIGHT HEART FAILURE WITH PRESERVED RIGHT VENTRICULAR FUNCTION (HCC): ICD-10-CM

## 2022-10-25 DIAGNOSIS — Z79.899 HIGH RISK MEDICATION USE: ICD-10-CM

## 2022-10-25 DIAGNOSIS — I10 HTN (HYPERTENSION), MALIGNANT: ICD-10-CM

## 2022-10-25 DIAGNOSIS — E66.01 MORBID OBESITY (HCC): ICD-10-CM

## 2022-10-25 DIAGNOSIS — E66.01 MORBID OBESITY WITH BMI OF 60.0-69.9, ADULT (HCC): ICD-10-CM

## 2022-10-25 DIAGNOSIS — I10 ESSENTIAL HYPERTENSION: ICD-10-CM

## 2022-10-25 PROCEDURE — 99214 OFFICE O/P EST MOD 30 MIN: CPT | Performed by: NURSE PRACTITIONER

## 2022-10-25 RX ORDER — SPIRONOLACTONE 50 MG/1
50 TABLET, FILM COATED ORAL DAILY
Qty: 90 TABLET | Refills: 3 | Status: SHIPPED | OUTPATIENT
Start: 2022-10-25 | End: 2023-10-24 | Stop reason: SDUPTHER

## 2022-10-25 RX ORDER — POTASSIUM CHLORIDE 1500 MG/1
TABLET, EXTENDED RELEASE ORAL
COMMUNITY
Start: 2022-08-01 | End: 2022-10-25

## 2022-10-25 ASSESSMENT — ENCOUNTER SYMPTOMS
ORTHOPNEA: 0
INSOMNIA: 0
COUGH: 0
ABDOMINAL PAIN: 0
NERVOUS/ANXIOUS: 0
PALPITATIONS: 0
FEVER: 0
CLAUDICATION: 0
DIZZINESS: 0
SHORTNESS OF BREATH: 1
PND: 0
MYALGIAS: 0

## 2022-10-25 ASSESSMENT — FIBROSIS 4 INDEX: FIB4 SCORE: 1.18

## 2022-12-07 ENCOUNTER — OFFICE VISIT (OUTPATIENT)
Dept: INTERNAL MEDICINE | Facility: OTHER | Age: 47
End: 2022-12-07
Payer: COMMERCIAL

## 2022-12-07 VITALS
WEIGHT: 315 LBS | BODY MASS INDEX: 53.78 KG/M2 | SYSTOLIC BLOOD PRESSURE: 109 MMHG | TEMPERATURE: 97.2 F | OXYGEN SATURATION: 93 % | HEART RATE: 82 BPM | DIASTOLIC BLOOD PRESSURE: 69 MMHG | HEIGHT: 64 IN

## 2022-12-07 DIAGNOSIS — Z23 ENCOUNTER FOR ADMINISTRATION OF VACCINE: ICD-10-CM

## 2022-12-07 DIAGNOSIS — M67.40 GANGLION CYST: ICD-10-CM

## 2022-12-07 DIAGNOSIS — G47.33 OBSTRUCTIVE SLEEP APNEA: ICD-10-CM

## 2022-12-07 DIAGNOSIS — E10.9 TYPE 1 DIABETES MELLITUS WITHOUT COMPLICATION (HCC): ICD-10-CM

## 2022-12-07 DIAGNOSIS — Z12.11 SCREENING FOR COLON CANCER: ICD-10-CM

## 2022-12-07 DIAGNOSIS — E11.9 TYPE 2 DIABETES MELLITUS WITHOUT COMPLICATION, WITHOUT LONG-TERM CURRENT USE OF INSULIN (HCC): ICD-10-CM

## 2022-12-07 LAB
HBA1C MFR BLD: 6.2 % (ref 0–5.6)
INT CON NEG: ABNORMAL
INT CON POS: ABNORMAL

## 2022-12-07 PROCEDURE — 99213 OFFICE O/P EST LOW 20 MIN: CPT | Mod: 25,GE | Performed by: STUDENT IN AN ORGANIZED HEALTH CARE EDUCATION/TRAINING PROGRAM

## 2022-12-07 PROCEDURE — 90471 IMMUNIZATION ADMIN: CPT | Performed by: STUDENT IN AN ORGANIZED HEALTH CARE EDUCATION/TRAINING PROGRAM

## 2022-12-07 PROCEDURE — 83036 HEMOGLOBIN GLYCOSYLATED A1C: CPT | Performed by: STUDENT IN AN ORGANIZED HEALTH CARE EDUCATION/TRAINING PROGRAM

## 2022-12-07 PROCEDURE — 90677 PCV20 VACCINE IM: CPT | Performed by: STUDENT IN AN ORGANIZED HEALTH CARE EDUCATION/TRAINING PROGRAM

## 2022-12-07 PROCEDURE — 90472 IMMUNIZATION ADMIN EACH ADD: CPT | Performed by: STUDENT IN AN ORGANIZED HEALTH CARE EDUCATION/TRAINING PROGRAM

## 2022-12-07 PROCEDURE — 90715 TDAP VACCINE 7 YRS/> IM: CPT | Performed by: STUDENT IN AN ORGANIZED HEALTH CARE EDUCATION/TRAINING PROGRAM

## 2022-12-07 RX ORDER — FLUTICASONE PROPIONATE 50 MCG
2 SPRAY, SUSPENSION (ML) NASAL DAILY
Qty: 16 G | Refills: 1 | Status: SHIPPED | OUTPATIENT
Start: 2022-12-07 | End: 2023-02-19 | Stop reason: SDUPTHER

## 2022-12-07 ASSESSMENT — PATIENT HEALTH QUESTIONNAIRE - PHQ9: CLINICAL INTERPRETATION OF PHQ2 SCORE: 0

## 2022-12-07 ASSESSMENT — FIBROSIS 4 INDEX: FIB4 SCORE: 1.18

## 2022-12-07 NOTE — ASSESSMENT & PLAN NOTE
R wrist ganglionic cyst, minimal limits to extension. Discussed referral to orthopaedics for possible surgery, declines at this time.  -Follow up as requested

## 2022-12-07 NOTE — ASSESSMENT & PLAN NOTE
A1c in clinic today of 6.2, which is a slow increase from previous. Has been working on diet and exercise, has cut out sugary drinks, walking more.  -Metformin 500mg PO BID  -empagliflozin 25mg PO qD (cardioprotection)  -podiatry referral placed  -offered bariatrics referral, declines at this time  -encouraged to diet/exercise, possibility of change to diet controlled DM

## 2022-12-07 NOTE — PROGRESS NOTES
CC:   Chief Complaint   Patient presents with    Follow-Up     Go over medication                                                                                                                                            HPI:   Shelli presents today with the following.    Follow up of DM, BRANDEN.    Recently got new BiPAP 3 weeks ago. Has improved in sleep quality, fatigue, stable to improvement on the leg swelling.      Has a lump on R wrist. No associated pain/discomfort, but does at times have some limit to wrist extension. Has been present for about 2 years.       Patient Active Problem List    Diagnosis Date Noted    Right heart failure with preserved right ventricular function ([facility]) 06/19/2021    Obstructive sleep apnea 06/19/2021    Respiratory failure ([facility]) 06/21/2021    Morbid obesity ([facility]) 06/19/2021    SHARITA (acute kidney injury) ([facility]) 06/19/2021    Hypertension 06/19/2021    Ganglion cyst 12/07/2022    Type 2 diabetes mellitus without complication, without long-term current use of insulin ([facility]) 06/21/2021    Carpal tunnel syndrome 12/07/2005       Current Outpatient Medications   Medication Sig Dispense Refill    fluticasone (FLONASE) 50 MCG/ACT nasal spray Administer 2 Sprays into affected nostril(S) every day. 16 g 1    spironolactone (ALDACTONE) 50 MG Tab Take 1 Tablet by mouth every day. 90 Tablet 3    Empagliflozin (JARDIANCE) 10 MG Tab Take 1 Tablet by mouth every day. 90 Tablet 3    torsemide (DEMADEX) 100 MG Tab Take 1 Tablet by mouth every day. 90 Tablet 3    potassium chloride SA (KDUR) 20 MEQ Tab CR Take 1 Tablet by mouth 2 times a day. 180 Tablet 3    losartan (COZAAR) 100 MG Tab Take 1 Tablet by mouth every day. 90 Tablet 3    carvedilol (COREG) 25 MG Tab Take 1 Tablet by mouth 2 times a day with meals. 180 Tablet 3    amLODIPine (NORVASC) 10 MG Tab Take 1 Tablet by mouth every day. 90 Tablet 3    metFORMIN (GLUCOPHAGE) 500 MG Tab Take 1 Tablet by mouth 2 times a day with meals. 180 Tablet  "3    tiotropium (SPIRIVA) 18 MCG Cap Place 18 mcg into inhaler and inhale every day.       No current facility-administered medications for this visit.         Allergies as of 12/07/2022    (No Known Allergies)          /69 (BP Location: Right arm, Patient Position: Sitting, BP Cuff Size: Large adult)   Pulse 82   Temp 36.2 °C (97.2 °F) (Temporal)   Ht 1.626 m (5' 4\")   Wt (!) 170 kg (375 lb)   SpO2 93%   BMI 64.37 kg/m²     ROS     Physical Exam:  Gen:  Alert and oriented, No apparent distress.  Neuro:  CN II-XII intact, no focal deficits  Lungs:  CTAB  CV:  RRR no m/g/r  Abd:  Soft, nontender, nondistended  Skin:  No acute rash/lesions  Ext:  Shoulder/elbow flexion/extension 5/5 bilaterally and symmetric; ambulates independently with steady gait.  Diabetic foot: Normal monofilament testing of bilateral feet, diffuse dry skin of the plantar aspect of bilateral feet, with calluses to hallux bilateral. toenails thickened, minimal nail yellowing. Proprioception intact bilaterally.        Assessment and Plan.   Shelli Moreira is a 46 y.o. male with the following issues:    Obstructive sleep apnea  Sleep apnea, was on CPAP earlier, but needed new equipment. Recently (mid Nov 2022) received his BiPAP, for which he has occasional difficult with the mask, but otherwise notices improved sleep quality, fatigue, and some reduction in leg swelling.  -renewed referral to sleep med today (last visit >1 year ago)  -Encouraged to consistent BiPAP use      Type 2 diabetes mellitus without complication, without long-term current use of insulin ([facility])  A1c in clinic today of 6.2, which is a slow increase from previous. Has been working on diet and exercise, has cut out sugary drinks, walking more.  -Metformin 500mg PO BID  -empagliflozin 25mg PO qD (cardioprotection)  -podiatry referral placed  -offered bariatrics referral, declines at this time  -encouraged to diet/exercise, possibility of change to diet controlled " DM      Ganglion cyst  R wrist ganglionic cyst, minimal limits to extension. Discussed referral to orthopaedics for possible surgery, declines at this time.  -Follow up as requested       Follow up 6 months (July) for annual exam, DM2, preventative health.

## 2022-12-07 NOTE — ASSESSMENT & PLAN NOTE
Sleep apnea, was on CPAP earlier, but needed new equipment. Recently (mid Nov 2022) received his BiPAP, for which he has occasional difficult with the mask, but otherwise notices improved sleep quality, fatigue, and some reduction in leg swelling.  -renewed referral to sleep med today (last visit >1 year ago)  -Encouraged to consistent BiPAP use

## 2022-12-30 ENCOUNTER — OFFICE VISIT (OUTPATIENT)
Dept: CARDIOLOGY | Facility: MEDICAL CENTER | Age: 47
End: 2022-12-30
Payer: COMMERCIAL

## 2022-12-30 VITALS
DIASTOLIC BLOOD PRESSURE: 70 MMHG | WEIGHT: 315 LBS | RESPIRATION RATE: 22 BRPM | BODY MASS INDEX: 53.78 KG/M2 | OXYGEN SATURATION: 89 % | HEIGHT: 64 IN | HEART RATE: 99 BPM | SYSTOLIC BLOOD PRESSURE: 114 MMHG

## 2022-12-30 DIAGNOSIS — I51.89 RIGHT VENTRICULAR DYSFUNCTION, NYHA CLASS 3: ICD-10-CM

## 2022-12-30 DIAGNOSIS — I50.810 RIGHT HEART FAILURE WITH PRESERVED RIGHT VENTRICULAR FUNCTION (HCC): ICD-10-CM

## 2022-12-30 DIAGNOSIS — I10 HTN (HYPERTENSION), MALIGNANT: ICD-10-CM

## 2022-12-30 DIAGNOSIS — E66.01 MORBID OBESITY WITH BODY MASS INDEX OF 60.0-69.9 IN ADULT (HCC): ICD-10-CM

## 2022-12-30 DIAGNOSIS — Z79.899 HIGH RISK MEDICATION USE: ICD-10-CM

## 2022-12-30 DIAGNOSIS — I27.20 PULMONARY HYPERTENSION (HCC): ICD-10-CM

## 2022-12-30 PROCEDURE — 99213 OFFICE O/P EST LOW 20 MIN: CPT | Performed by: NURSE PRACTITIONER

## 2022-12-30 ASSESSMENT — ENCOUNTER SYMPTOMS
COUGH: 0
DIZZINESS: 0
NERVOUS/ANXIOUS: 0
INSOMNIA: 0
MYALGIAS: 0
PND: 0
CLAUDICATION: 0
PALPITATIONS: 0
ABDOMINAL PAIN: 0
FEVER: 0
SHORTNESS OF BREATH: 1
ORTHOPNEA: 0

## 2022-12-30 ASSESSMENT — FIBROSIS 4 INDEX: FIB4 SCORE: 1.21

## 2022-12-30 NOTE — PROGRESS NOTES
Chief Complaint   Patient presents with    Congestive Heart Failure     FV Dx: Right ventricular dysfunction, NYHA class 3       Subjective:   Shelli Moreira is a 47 y.o. male who presents today for follow-up on his heart failure.    Current patient of the Heart failure clinic.  He was last seen in clinic on 10/25/2022.  During that visit, he was recommended to increase spironolactone to 50 mg daily.  He reports no problems with the dose increase.  Patient was also recommended to get his labs done, but he did not.    Patient does report improvement of his breathing.  He does continue have some lower extremity edema.  He denies chest pain, palpitations, orthopnea, PND or dizziness/lightheadedness.    Patient reports he did lose some weight since his last visit, he did get down to 372 lbs but then he gained weight back due to the holidays.  He has not been weighing recently at home, but his office weights are up to 390 lbs in office     Pt received BIPAP about 5 weeks ago and has been using his device and feels better.    Additonally, patient has the following medical problems:    -hospitalization from 6/18/2021 through 6/23/2021.  Patient had presented with shortness of breath and weight gain.  He was a transfer from [facility].  Patient was found to have acute hypoxemic respiratory failure.  An echo was performed and patient was found to have right-sided heart failure.  Patient has a history of sleep apnea and obesity.  Likely his CPAP is not as effective.  Patient diuresed and discharged home.  Patient recommended to follow-up for repeat sleep study    -Hypertension    -Sleep apnea    Past Medical History:   Diagnosis Date    Asthma     Congestive heart failure (HCC)     Hypertension     Sleep apnea      Past Surgical History:   Procedure Laterality Date    KNEE ARTHROSCOPY Left 2009     Family History   Problem Relation Age of Onset    Diabetes Mother     No Known Problems Sister      Social History      Socioeconomic History    Marital status:      Spouse name: Not on file    Number of children: Not on file    Years of education: Not on file    Highest education level: Not on file   Occupational History    Not on file   Tobacco Use    Smoking status: Never    Smokeless tobacco: Never    Tobacco comments:     vapes   Vaping Use    Vaping Use: Every day    Substances: THC    Devices: Disposable   Substance and Sexual Activity    Alcohol use: Not Currently    Drug use: Not Currently     Types: Inhaled, Marijuana     Comment: THC    Sexual activity: Not on file   Other Topics Concern    Not on file   Social History Narrative    Not on file     Social Determinants of Health     Financial Resource Strain: Not on file   Food Insecurity: Not on file   Transportation Needs: Not on file   Physical Activity: Not on file   Stress: Not on file   Social Connections: Not on file   Intimate Partner Violence: Not on file   Housing Stability: Not on file     No Known Allergies  Outpatient Encounter Medications as of 12/30/2022   Medication Sig Dispense Refill    fluticasone (FLONASE) 50 MCG/ACT nasal spray Administer 2 Sprays into affected nostril(S) every day. 16 g 1    spironolactone (ALDACTONE) 50 MG Tab Take 1 Tablet by mouth every day. 90 Tablet 3    Empagliflozin (JARDIANCE) 10 MG Tab Take 1 Tablet by mouth every day. 90 Tablet 3    torsemide (DEMADEX) 100 MG Tab Take 1 Tablet by mouth every day. 90 Tablet 3    potassium chloride SA (KDUR) 20 MEQ Tab CR Take 1 Tablet by mouth 2 times a day. 180 Tablet 3    losartan (COZAAR) 100 MG Tab Take 1 Tablet by mouth every day. 90 Tablet 3    carvedilol (COREG) 25 MG Tab Take 1 Tablet by mouth 2 times a day with meals. 180 Tablet 3    amLODIPine (NORVASC) 10 MG Tab Take 1 Tablet by mouth every day. 90 Tablet 3    metFORMIN (GLUCOPHAGE) 500 MG Tab Take 1 Tablet by mouth 2 times a day with meals. 180 Tablet 3    tiotropium (SPIRIVA) 18 MCG Cap Place 18 mcg into inhaler and  "inhale every day.       No facility-administered encounter medications on file as of 2022.     Review of Systems   Constitutional:  Negative for fever and malaise/fatigue.   HENT:  Negative for congestion.    Respiratory:  Positive for shortness of breath. Negative for cough.    Cardiovascular:  Positive for leg swelling. Negative for chest pain, palpitations, orthopnea, claudication and PND.   Gastrointestinal:  Negative for abdominal pain.   Musculoskeletal:  Negative for myalgias.   Neurological:  Negative for dizziness.   Psychiatric/Behavioral:  The patient is not nervous/anxious and does not have insomnia.    All other systems reviewed and are negative.     Objective:   /70 (BP Location: Left arm, Patient Position: Sitting, BP Cuff Size: Adult)   Pulse 99   Resp (!) 22   Ht 1.626 m (5' 4\")   Wt (!) 176 kg (389 lb)   SpO2 89%   BMI 66.77 kg/m²     Physical Exam  Vitals reviewed.   Constitutional:       Appearance: He is well-developed. He is morbidly obese.   HENT:      Head: Normocephalic and atraumatic.   Eyes:      General: Lids are normal.      Pupils: Pupils are equal, round, and reactive to light.   Neck:      Vascular: No JVD.   Cardiovascular:      Rate and Rhythm: Normal rate and regular rhythm.      Heart sounds: Normal heart sounds.   Pulmonary:      Effort: Pulmonary effort is normal. No respiratory distress.      Breath sounds: Normal breath sounds. No wheezing or rales.   Abdominal:      General: Bowel sounds are normal.      Palpations: Abdomen is soft.   Musculoskeletal:         General: Normal range of motion.      Cervical back: Normal range of motion and neck supple.      Right lower le+ Pitting Edema present.      Left lower le+ Pitting Edema present.   Skin:     General: Skin is warm and dry.   Neurological:      General: No focal deficit present.      Mental Status: He is alert and oriented to person, place, and time.   Psychiatric:         Speech: Speech normal.  "        Behavior: Behavior normal.         Thought Content: Thought content normal.         Judgment: Judgment normal.     Lab Results   Component Value Date/Time    CHOLSTRLTOT 110 06/19/2021 12:45 AM    LDL 51 06/19/2021 12:45 AM    HDL 44 06/19/2021 12:45 AM    TRIGLYCERIDE 73 06/19/2021 12:45 AM       Lab Results   Component Value Date/Time    SODIUM 137 07/26/2022 02:32 PM    SODIUM 136 07/26/2022 02:32 PM    POTASSIUM 3.9 07/26/2022 02:32 PM    POTASSIUM 3.7 07/26/2022 02:32 PM    CHLORIDE 94 (L) 07/26/2022 02:32 PM    CHLORIDE 94 (L) 07/26/2022 02:32 PM    CO2 31 07/26/2022 02:32 PM    CO2 32 07/26/2022 02:32 PM    GLUCOSE 100 (H) 07/26/2022 02:32 PM    GLUCOSE 100 (H) 07/26/2022 02:32 PM    BUN 13 07/26/2022 02:32 PM    BUN 13 07/26/2022 02:32 PM    CREATININE 1.27 07/26/2022 02:32 PM    CREATININE 1.26 07/26/2022 02:32 PM     Lab Results   Component Value Date/Time    ALKPHOSPHAT 85 07/26/2022 02:32 PM    ASTSGOT 24 07/26/2022 02:32 PM    ALTSGPT 17 07/26/2022 02:32 PM    TBILIRUBIN 0.4 07/26/2022 02:32 PM      Transthoracic Echo Report 6/18/2021  No prior study is available for comparison.   Normal left ventricular systolic function.   No evidence of valvular abnormality based on Doppler evaluation.   Severely dilated right ventricle. Normal right ventricular systolic   function.  Unable to estimate pulmonary artery pressure due to an inadequate   tricuspid regurgitant jet    Assessment:     1. Right heart failure with preserved right ventricular function (HCC)        2. Right ventricular dysfunction, NYHA class 3        3. HTN (hypertension), malignant        4. High risk medication use        5. Morbid obesity with body mass index of 60.0-69.9 in adult (HCC)        6. Pulmonary hypertension (HCC)              Medical Decision Making:  Today's Assessment / Status / Plan:   1.  Right-sided heart failure versus, obesity hypoventilation syndrome:  -Patient does exhibit some lower extremity edema, discussed  with patient that I am unable to change his medications today because he needs some follow-up lab testing especially with the recent medication changes.  He does verbalize understanding of the importance of getting his labs done as soon as possible.  He states he will try to get them done on Tuesday.  -Continue spironolactone 50 mg daily  -Continue torsemide 100 mg daily  -Continue potassium 20 mEq twice a day  -Continue Jardiance 10 mg daily   -Patient lab slips reprinted  -Reinforced s/sx of worsening symptoms with patient and weight monitoring. Pt verbalizes understanding. Pt to call office or RTC if present.   -Continue low-sodium diet when able    2.  Sleep apnea:  -Continue BiPAP use    3.  Obesity: BMI 66.77  -Discussed importance of weight loss and to get back on a good diet.  He verbalizes understanding    4.  Hypertension: Stable  -Continue amlodipine 10 mg daily, component of lower extremity edema could be related to this medication, can look into changing at next visit  -Continue carvedilol 25 mg twice a day  -Continue losartan 100 mg Daily    FU in clinic in 1 month with labs as soon as possible. Sooner if needed.    Patient verbalizes understanding and agrees with the plan of care.     PLEASE NOTE: This Note was created using voice recognition Software. I have made every reasonable attempt to correct obvious errors, but I expect that there are errors of grammar and possibly content that I did not discover before finalizing the note

## 2023-01-03 NOTE — TELEPHONE ENCOUNTER
Received request via:pharmacy    Was the patient seen in the last year in this department? Yes    Does the patient have an active prescription (recently filled or refills available) for medication(s) requested? Yes    Does the patient have assisted Plus and need 100 day supply (blood pressure, diabetes and cholesterol meds only)? Patient does not have SCP

## 2023-02-06 ENCOUNTER — TELEPHONE (OUTPATIENT)
Dept: CARDIOLOGY | Facility: MEDICAL CENTER | Age: 48
End: 2023-02-06
Payer: COMMERCIAL

## 2023-02-22 RX ORDER — FLUTICASONE PROPIONATE 50 MCG
2 SPRAY, SUSPENSION (ML) NASAL DAILY
Qty: 16 G | Refills: 1 | Status: SHIPPED | OUTPATIENT
Start: 2023-02-22 | End: 2023-05-05 | Stop reason: SDUPTHER

## 2023-03-24 ENCOUNTER — HOSPITAL ENCOUNTER (OUTPATIENT)
Dept: LAB | Facility: MEDICAL CENTER | Age: 48
End: 2023-03-24
Attending: INTERNAL MEDICINE
Payer: COMMERCIAL

## 2023-03-24 ENCOUNTER — HOSPITAL ENCOUNTER (OUTPATIENT)
Dept: LAB | Facility: MEDICAL CENTER | Age: 48
End: 2023-03-24
Attending: PODIATRIST
Payer: COMMERCIAL

## 2023-03-24 DIAGNOSIS — I50.810 RIGHT HEART FAILURE WITH PRESERVED RIGHT VENTRICULAR FUNCTION (HCC): ICD-10-CM

## 2023-03-24 DIAGNOSIS — Z79.899 HIGH RISK MEDICATION USE: ICD-10-CM

## 2023-03-24 LAB
ALBUMIN SERPL BCP-MCNC: 3.8 G/DL (ref 3.2–4.9)
ALP SERPL-CCNC: 110 U/L (ref 30–99)
ALT SERPL-CCNC: 15 U/L (ref 2–50)
AST SERPL-CCNC: 14 U/L (ref 12–45)
BILIRUB CONJ SERPL-MCNC: <0.2 MG/DL (ref 0.1–0.5)
BILIRUB INDIRECT SERPL-MCNC: ABNORMAL MG/DL (ref 0–1)
BILIRUB SERPL-MCNC: 0.4 MG/DL (ref 0.1–1.5)
PROT SERPL-MCNC: 7.7 G/DL (ref 6–8.2)

## 2023-03-24 PROCEDURE — 83880 ASSAY OF NATRIURETIC PEPTIDE: CPT

## 2023-03-24 PROCEDURE — 80076 HEPATIC FUNCTION PANEL: CPT

## 2023-03-24 PROCEDURE — 36415 COLL VENOUS BLD VENIPUNCTURE: CPT

## 2023-03-24 PROCEDURE — 80048 BASIC METABOLIC PNL TOTAL CA: CPT

## 2023-03-25 LAB
ANION GAP SERPL CALC-SCNC: 9 MMOL/L (ref 7–16)
BUN SERPL-MCNC: 15 MG/DL (ref 8–22)
CALCIUM SERPL-MCNC: 9.1 MG/DL (ref 8.5–10.5)
CHLORIDE SERPL-SCNC: 98 MMOL/L (ref 96–112)
CO2 SERPL-SCNC: 32 MMOL/L (ref 20–33)
CREAT SERPL-MCNC: 1.16 MG/DL (ref 0.5–1.4)
GFR SERPLBLD CREATININE-BSD FMLA CKD-EPI: 78 ML/MIN/1.73 M 2
GLUCOSE SERPL-MCNC: 88 MG/DL (ref 65–99)
NT-PROBNP SERPL IA-MCNC: 73 PG/ML (ref 0–125)
POTASSIUM SERPL-SCNC: 4.1 MMOL/L (ref 3.6–5.5)
SODIUM SERPL-SCNC: 139 MMOL/L (ref 135–145)

## 2023-05-07 RX ORDER — FLUTICASONE PROPIONATE 50 MCG
2 SPRAY, SUSPENSION (ML) NASAL DAILY
Qty: 16 G | Refills: 0 | Status: SHIPPED | OUTPATIENT
Start: 2023-05-07 | End: 2023-06-21 | Stop reason: SDUPTHER

## 2023-06-22 RX ORDER — FLUTICASONE PROPIONATE 50 MCG
2 SPRAY, SUSPENSION (ML) NASAL DAILY
Qty: 16 G | Refills: 0 | Status: SHIPPED | OUTPATIENT
Start: 2023-06-22 | End: 2023-09-28 | Stop reason: SDUPTHER

## 2023-08-01 DIAGNOSIS — Z79.899 HIGH RISK MEDICATION USE: ICD-10-CM

## 2023-08-01 DIAGNOSIS — I50.810 RIGHT HEART FAILURE WITH PRESERVED RIGHT VENTRICULAR FUNCTION (HCC): ICD-10-CM

## 2023-08-01 RX ORDER — TORSEMIDE 100 MG/1
100 TABLET ORAL DAILY
Qty: 90 TABLET | Refills: 0 | Status: SHIPPED | OUTPATIENT
Start: 2023-08-01 | End: 2023-09-06

## 2023-08-05 DIAGNOSIS — I10 HTN (HYPERTENSION), MALIGNANT: ICD-10-CM

## 2023-08-07 RX ORDER — LOSARTAN POTASSIUM 100 MG/1
100 TABLET ORAL DAILY
Qty: 90 TABLET | Refills: 1 | Status: SHIPPED | OUTPATIENT
Start: 2023-08-07 | End: 2023-10-24 | Stop reason: SDUPTHER

## 2023-08-07 NOTE — TELEPHONE ENCOUNTER
Is the patient due for a refill? Yes    Was the patient seen the past year? Yes    Date of last office visit: 12/30/2022    Does the patient have an upcoming appointment?  No   If yes, When?     Provider to refill:SHELLY    Does the patients insurance require a 100 day supply?  No

## 2023-08-15 DIAGNOSIS — I27.20 PULMONARY HYPERTENSION (HCC): ICD-10-CM

## 2023-08-15 DIAGNOSIS — I51.89 RIGHT VENTRICULAR DYSFUNCTION, NYHA CLASS 3: ICD-10-CM

## 2023-08-15 DIAGNOSIS — R06.09 DYSPNEA ON EXERTION: ICD-10-CM

## 2023-08-15 RX ORDER — EMPAGLIFLOZIN 10 MG/1
10 TABLET, FILM COATED ORAL DAILY
Qty: 90 TABLET | Refills: 1 | Status: SHIPPED | OUTPATIENT
Start: 2023-08-15 | End: 2023-10-24 | Stop reason: SDUPTHER

## 2023-08-27 DIAGNOSIS — I50.810 RIGHT HEART FAILURE WITH PRESERVED RIGHT VENTRICULAR FUNCTION (HCC): ICD-10-CM

## 2023-08-28 NOTE — TELEPHONE ENCOUNTER
Is the patient due for a refill? Yes    Was the patient seen the past year? Yes    Date of last office visit: 12/30/22    Does the patient have an upcoming appointment?  Yes   If yes, When? 9/6/23    Provider to refill:SHELLY    Does the patients insurance require a 100 day supply?  No

## 2023-08-30 ENCOUNTER — OFFICE VISIT (OUTPATIENT)
Dept: MEDICAL GROUP | Facility: LAB | Age: 48
End: 2023-08-30
Payer: COMMERCIAL

## 2023-08-30 VITALS
TEMPERATURE: 97 F | HEART RATE: 74 BPM | OXYGEN SATURATION: 90 % | DIASTOLIC BLOOD PRESSURE: 70 MMHG | BODY MASS INDEX: 53.78 KG/M2 | SYSTOLIC BLOOD PRESSURE: 112 MMHG | RESPIRATION RATE: 22 BRPM | HEIGHT: 64 IN | WEIGHT: 315 LBS

## 2023-08-30 DIAGNOSIS — E11.9 TYPE 2 DIABETES MELLITUS WITHOUT COMPLICATION, WITHOUT LONG-TERM CURRENT USE OF INSULIN (HCC): ICD-10-CM

## 2023-08-30 DIAGNOSIS — R55 SYNCOPE, UNSPECIFIED SYNCOPE TYPE: ICD-10-CM

## 2023-08-30 DIAGNOSIS — G47.33 OBSTRUCTIVE SLEEP APNEA: ICD-10-CM

## 2023-08-30 DIAGNOSIS — I10 PRIMARY HYPERTENSION: ICD-10-CM

## 2023-08-30 DIAGNOSIS — I50.810 RIGHT HEART FAILURE WITH PRESERVED RIGHT VENTRICULAR FUNCTION (HCC): ICD-10-CM

## 2023-08-30 DIAGNOSIS — Z00.00 HEALTHCARE MAINTENANCE: ICD-10-CM

## 2023-08-30 DIAGNOSIS — Z12.11 COLON CANCER SCREENING: ICD-10-CM

## 2023-08-30 PROBLEM — N17.9 AKI (ACUTE KIDNEY INJURY) (HCC): Status: RESOLVED | Noted: 2021-06-19 | Resolved: 2023-08-30

## 2023-08-30 PROBLEM — J96.90 RESPIRATORY FAILURE (HCC): Status: RESOLVED | Noted: 2021-06-21 | Resolved: 2023-08-30

## 2023-08-30 PROCEDURE — 3078F DIAST BP <80 MM HG: CPT | Performed by: STUDENT IN AN ORGANIZED HEALTH CARE EDUCATION/TRAINING PROGRAM

## 2023-08-30 PROCEDURE — 3074F SYST BP LT 130 MM HG: CPT | Performed by: STUDENT IN AN ORGANIZED HEALTH CARE EDUCATION/TRAINING PROGRAM

## 2023-08-30 PROCEDURE — 99214 OFFICE O/P EST MOD 30 MIN: CPT | Performed by: STUDENT IN AN ORGANIZED HEALTH CARE EDUCATION/TRAINING PROGRAM

## 2023-08-30 RX ORDER — FUROSEMIDE 20 MG/1
20 TABLET ORAL 2 TIMES DAILY
Qty: 60 TABLET | Refills: 0 | Status: SHIPPED | OUTPATIENT
Start: 2023-08-30 | End: 2023-08-30

## 2023-08-30 RX ORDER — FUROSEMIDE 20 MG/1
20 TABLET ORAL 2 TIMES DAILY PRN
Qty: 60 TABLET | Refills: 0 | Status: SHIPPED | OUTPATIENT
Start: 2023-08-30 | End: 2023-09-06

## 2023-08-30 ASSESSMENT — ENCOUNTER SYMPTOMS
FEVER: 0
SHORTNESS OF BREATH: 0
CHILLS: 0

## 2023-08-30 ASSESSMENT — PATIENT HEALTH QUESTIONNAIRE - PHQ9: CLINICAL INTERPRETATION OF PHQ2 SCORE: 0

## 2023-08-30 NOTE — ASSESSMENT & PLAN NOTE
Chronic  - Follow-up with cardiology  - We will provide Lasix 20 mg twice daily in case this fluid overload in the future   -patient told avoid high sodium foods

## 2023-08-30 NOTE — LETTER
[employer]  Madhu Keane D.O.  [address] [facility] [address]  [place] [address]  Fax: [phone]   Authorization for Release/Disclosure of   Protected Health Information   Name: SHELLI MOREIRA : 1975 SSN: [id number]   Address: [address] [place] [id number] Phone:    [phone] (home)    I authorize the entity listed below to release/disclose the PHI below to:   [employer] [facility] [employer]/Madhu Keane D.O. and Madhu Keane D.O.   Provider or Entity Name:  [facility]   Address   City, State, [facility]   Phone:  [phone]    Fax:     Reason for request: continuity of care   Information to be released:    [  ] LAST COLONOSCOPY,  including any PATH REPORT and follow-up  [  ] LAST FIT/COLOGUARD RESULT [  ] LAST DEXA  [  ] LAST MAMMOGRAM  [  ] LAST PAP  [  ] LAST LABS [  ] RETINA EXAM REPORT  [  ] IMMUNIZATION RECORDS  [ XX ] Release all info recent ER visit      [  ] Check here and initial the line next to each item to release ALL health information INCLUDING  _____ Care and treatment for drug and / or alcohol abuse  _____ HIV testing, infection status, or AIDS  _____ Genetic Testing    DATES OF SERVICE OR TIME PERIOD TO BE DISCLOSED: _____________  I understand and acknowledge that:  * This Authorization may be revoked at any time by you in writing, except if your health information has already been used or disclosed.  * Your health information that will be used or disclosed as a result of you signing this authorization could be re-disclosed by the recipient. If this occurs, your re-disclosed health information may no longer be protected by State or Federal laws.  * You may refuse to sign this Authorization. Your refusal will not affect your ability to obtain treatment.  * This Authorization becomes effective upon signing and will  on (date) __________.      If no date is indicated, this Authorization will  one (1) year from the signature date.    Name: Shelli Moreira  Signature: Date:   2023      PLEASE FAX REQUESTED RECORDS BACK TO: [phone]
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
## 2023-08-30 NOTE — ASSESSMENT & PLAN NOTE
Chronic-stable  -Continue Jardiance 10 mg along with metformin 500 mg BID  -will consider glp1 in the future

## 2023-08-31 RX ORDER — POTASSIUM CHLORIDE 1500 MG/1
TABLET, EXTENDED RELEASE ORAL
Qty: 180 TABLET | Refills: 0 | Status: SHIPPED | OUTPATIENT
Start: 2023-08-31 | End: 2023-08-31

## 2023-08-31 RX ORDER — AMLODIPINE BESYLATE 10 MG/1
10 TABLET ORAL DAILY
Qty: 90 TABLET | Refills: 0 | Status: SHIPPED | OUTPATIENT
Start: 2023-08-31 | End: 2023-10-24 | Stop reason: SDUPTHER

## 2023-08-31 RX ORDER — POTASSIUM CHLORIDE 20 MEQ/1
20 TABLET, EXTENDED RELEASE ORAL 2 TIMES DAILY
Qty: 180 TABLET | Refills: 1 | Status: SHIPPED | OUTPATIENT
Start: 2023-08-31 | End: 2023-10-24 | Stop reason: SDUPTHER

## 2023-09-06 ENCOUNTER — OFFICE VISIT (OUTPATIENT)
Dept: CARDIOLOGY | Facility: MEDICAL CENTER | Age: 48
End: 2023-09-06
Attending: NURSE PRACTITIONER
Payer: COMMERCIAL

## 2023-09-06 VITALS
HEART RATE: 86 BPM | HEIGHT: 64 IN | SYSTOLIC BLOOD PRESSURE: 126 MMHG | BODY MASS INDEX: 53.78 KG/M2 | WEIGHT: 315 LBS | DIASTOLIC BLOOD PRESSURE: 72 MMHG | OXYGEN SATURATION: 90 % | RESPIRATION RATE: 16 BRPM

## 2023-09-06 DIAGNOSIS — R06.09 DYSPNEA ON EXERTION: ICD-10-CM

## 2023-09-06 DIAGNOSIS — E66.01 MORBID OBESITY WITH BODY MASS INDEX OF 60.0-69.9 IN ADULT (HCC): ICD-10-CM

## 2023-09-06 DIAGNOSIS — I27.20 PULMONARY HYPERTENSION (HCC): ICD-10-CM

## 2023-09-06 DIAGNOSIS — I50.810 RIGHT HEART FAILURE WITH PRESERVED RIGHT VENTRICULAR FUNCTION (HCC): ICD-10-CM

## 2023-09-06 DIAGNOSIS — G47.33 OBSTRUCTIVE SLEEP APNEA: ICD-10-CM

## 2023-09-06 DIAGNOSIS — I10 HTN (HYPERTENSION), MALIGNANT: ICD-10-CM

## 2023-09-06 PROCEDURE — 99212 OFFICE O/P EST SF 10 MIN: CPT | Performed by: NURSE PRACTITIONER

## 2023-09-06 PROCEDURE — 3074F SYST BP LT 130 MM HG: CPT | Performed by: NURSE PRACTITIONER

## 2023-09-06 PROCEDURE — 99214 OFFICE O/P EST MOD 30 MIN: CPT | Performed by: NURSE PRACTITIONER

## 2023-09-06 PROCEDURE — 3078F DIAST BP <80 MM HG: CPT | Performed by: NURSE PRACTITIONER

## 2023-09-06 RX ORDER — TORSEMIDE 100 MG/1
50-100 TABLET ORAL 2 TIMES DAILY
Qty: 90 TABLET | Refills: 5 | Status: SHIPPED | OUTPATIENT
Start: 2023-09-06 | End: 2023-10-24 | Stop reason: SDUPTHER

## 2023-09-06 ASSESSMENT — ENCOUNTER SYMPTOMS
ORTHOPNEA: 1
PND: 0
PALPITATIONS: 0
INSOMNIA: 0
FEVER: 0
NEUROLOGICAL NEGATIVE: 1
CONSTITUTIONAL NEGATIVE: 1
SHORTNESS OF BREATH: 1
NERVOUS/ANXIOUS: 0
MYALGIAS: 0
COUGH: 0
EYES NEGATIVE: 1
CLAUDICATION: 0
DIZZINESS: 0
ABDOMINAL PAIN: 0
ORTHOPNEA: 0
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
## 2023-09-16 ENCOUNTER — HOSPITAL ENCOUNTER (OUTPATIENT)
Dept: LAB | Facility: MEDICAL CENTER | Age: 48
End: 2023-09-16
Attending: NURSE PRACTITIONER
Payer: COMMERCIAL

## 2023-09-16 DIAGNOSIS — R06.09 DYSPNEA ON EXERTION: ICD-10-CM

## 2023-09-16 DIAGNOSIS — Z79.899 HIGH RISK MEDICATION USE: ICD-10-CM

## 2023-09-16 DIAGNOSIS — I50.810 RIGHT HEART FAILURE WITH PRESERVED RIGHT VENTRICULAR FUNCTION (HCC): ICD-10-CM

## 2023-09-16 LAB
ANION GAP SERPL CALC-SCNC: 12 MMOL/L (ref 7–16)
BUN SERPL-MCNC: 22 MG/DL (ref 8–22)
CALCIUM SERPL-MCNC: 9.8 MG/DL (ref 8.5–10.5)
CHLORIDE SERPL-SCNC: 95 MMOL/L (ref 96–112)
CO2 SERPL-SCNC: 28 MMOL/L (ref 20–33)
CREAT SERPL-MCNC: 1.44 MG/DL (ref 0.5–1.4)
FASTING STATUS PATIENT QL REPORTED: NORMAL
GFR SERPLBLD CREATININE-BSD FMLA CKD-EPI: 60 ML/MIN/1.73 M 2
GLUCOSE SERPL-MCNC: 109 MG/DL (ref 65–99)
MAGNESIUM SERPL-MCNC: 2.3 MG/DL (ref 1.5–2.5)
NT-PROBNP SERPL IA-MCNC: 157 PG/ML (ref 0–125)
POTASSIUM SERPL-SCNC: 3.9 MMOL/L (ref 3.6–5.5)
SODIUM SERPL-SCNC: 135 MMOL/L (ref 135–145)

## 2023-09-16 PROCEDURE — 83880 ASSAY OF NATRIURETIC PEPTIDE: CPT

## 2023-09-16 PROCEDURE — 36415 COLL VENOUS BLD VENIPUNCTURE: CPT

## 2023-09-16 PROCEDURE — 80048 BASIC METABOLIC PNL TOTAL CA: CPT

## 2023-09-16 PROCEDURE — 83735 ASSAY OF MAGNESIUM: CPT

## 2023-09-27 ENCOUNTER — TELEPHONE (OUTPATIENT)
Dept: CARDIOLOGY | Facility: MEDICAL CENTER | Age: 48
End: 2023-09-27
Payer: COMMERCIAL

## 2023-09-27 DIAGNOSIS — I10 HTN (HYPERTENSION), MALIGNANT: ICD-10-CM

## 2023-09-27 NOTE — TELEPHONE ENCOUNTER
Message  Received: Yesterday  JOSE G Avalos R.N.  Please let patient know that his kidney function is up slightly, please make sure he is hydrating well.  If possible, please have him repeat another BMP before his next visit    Associated Results     Contains abnormal data Basic Metabolic Panel  ----------------------------------------------------------------------------------------------    See [employer]t message.

## 2023-09-27 NOTE — RESULT ENCOUNTER NOTE
Please let patient know that his kidney function is up slightly, please make sure he is hydrating well.  If possible, please have him repeat another BMP before his next visit

## 2023-09-29 RX ORDER — FLUTICASONE PROPIONATE 50 MCG
2 SPRAY, SUSPENSION (ML) NASAL DAILY
Qty: 16 G | Refills: 0 | Status: SHIPPED | OUTPATIENT
Start: 2023-09-29

## 2023-10-07 ENCOUNTER — HOSPITAL ENCOUNTER (OUTPATIENT)
Dept: LAB | Facility: MEDICAL CENTER | Age: 48
End: 2023-10-07
Attending: NURSE PRACTITIONER
Payer: COMMERCIAL

## 2023-10-07 DIAGNOSIS — I10 HTN (HYPERTENSION), MALIGNANT: ICD-10-CM

## 2023-10-07 LAB
ANION GAP SERPL CALC-SCNC: 13 MMOL/L (ref 7–16)
BUN SERPL-MCNC: 22 MG/DL (ref 8–22)
CALCIUM SERPL-MCNC: 9.8 MG/DL (ref 8.5–10.5)
CHLORIDE SERPL-SCNC: 97 MMOL/L (ref 96–112)
CO2 SERPL-SCNC: 27 MMOL/L (ref 20–33)
CREAT SERPL-MCNC: 1.33 MG/DL (ref 0.5–1.4)
GFR SERPLBLD CREATININE-BSD FMLA CKD-EPI: 66 ML/MIN/1.73 M 2
GLUCOSE SERPL-MCNC: 89 MG/DL (ref 65–99)
POTASSIUM SERPL-SCNC: 4.2 MMOL/L (ref 3.6–5.5)
SODIUM SERPL-SCNC: 137 MMOL/L (ref 135–145)

## 2023-10-07 PROCEDURE — 36415 COLL VENOUS BLD VENIPUNCTURE: CPT

## 2023-10-07 PROCEDURE — 80048 BASIC METABOLIC PNL TOTAL CA: CPT

## 2023-10-11 ENCOUNTER — OFFICE VISIT (OUTPATIENT)
Dept: CARDIOLOGY | Facility: MEDICAL CENTER | Age: 48
End: 2023-10-11
Attending: NURSE PRACTITIONER
Payer: COMMERCIAL

## 2023-10-11 VITALS
BODY MASS INDEX: 53.78 KG/M2 | SYSTOLIC BLOOD PRESSURE: 110 MMHG | DIASTOLIC BLOOD PRESSURE: 60 MMHG | HEART RATE: 88 BPM | OXYGEN SATURATION: 91 % | RESPIRATION RATE: 17 BRPM | HEIGHT: 64 IN | WEIGHT: 315 LBS

## 2023-10-11 DIAGNOSIS — Z79.899 HIGH RISK MEDICATION USE: ICD-10-CM

## 2023-10-11 DIAGNOSIS — I51.89 RIGHT VENTRICULAR DYSFUNCTION, NYHA CLASS 3: ICD-10-CM

## 2023-10-11 DIAGNOSIS — I10 HTN (HYPERTENSION), MALIGNANT: ICD-10-CM

## 2023-10-11 DIAGNOSIS — R06.09 DYSPNEA ON EXERTION: ICD-10-CM

## 2023-10-11 DIAGNOSIS — I27.20 PULMONARY HYPERTENSION (HCC): ICD-10-CM

## 2023-10-11 DIAGNOSIS — G47.33 OBSTRUCTIVE SLEEP APNEA: ICD-10-CM

## 2023-10-11 DIAGNOSIS — E66.01 MORBID OBESITY WITH BMI OF 60.0-69.9, ADULT (HCC): ICD-10-CM

## 2023-10-11 PROCEDURE — 3074F SYST BP LT 130 MM HG: CPT | Performed by: NURSE PRACTITIONER

## 2023-10-11 PROCEDURE — 99214 OFFICE O/P EST MOD 30 MIN: CPT | Performed by: NURSE PRACTITIONER

## 2023-10-11 PROCEDURE — 3078F DIAST BP <80 MM HG: CPT | Performed by: NURSE PRACTITIONER

## 2023-10-11 ASSESSMENT — ENCOUNTER SYMPTOMS
COUGH: 0
PALPITATIONS: 0
NERVOUS/ANXIOUS: 0
INSOMNIA: 0
DIZZINESS: 0
FEVER: 0
ORTHOPNEA: 0
PND: 0
SHORTNESS OF BREATH: 1
CLAUDICATION: 0
ABDOMINAL PAIN: 0
MYALGIAS: 0

## 2023-10-11 ASSESSMENT — MINNESOTA LIVING WITH HEART FAILURE QUESTIONNAIRE (MLHF)
DIFFICULTY WORKING TO EARN A LIVING: 1
MAKING YOU SHORT OF BREATH: 3
DIFFICULTY SLEEPING WELL AT NIGHT: 2
DIFFICULTY WITH SEXUAL ACTIVITIES: 2
MAKING YOU FEEL DEPRESSED: 1
WORKING AROUND THE HOUSE OR YARD DIFFICULT: 2
MAKING YOU WORRY: 2
DIFFICULTY TO CONCENTRATE OR REMEMBERING THINGS: 1
FEELING LIKE A BURDEN TO FAMILY AND FRIENDS: 1
DIFFICULTY SOCIALIZING WITH FAMILY OR FRIENDS: 1
SWELLING IN ANKLES OR LEGS: 1
COSTING YOU MONEY FOR MEDICAL CARE: 3
MAKING YOU STAY IN A HOSPITAL: 0
WALKING ABOUT OR CLIMBING STAIRS DIFFICULT: 2
DIFFICULTY GOING AWAY FROM HOME: 1
HAVING TO SIT OR LIE DOWN DURING THE DAY: 3
TIRED, FATIGUED OR LOW ON ENERGY: 2
TOTAL_SCORE: 35
DIFFICULTY WITH RECREATIONAL PASTIMES, SPORTS, HOBBIES: 1
GIVING YOU SIDE EFFECTS FROM TREATMENTS: 0
LOSS OF SELF CONTROL IN YOUR LIFE: 1
EATING LESS FOODS YOU LIKE: 5

## 2023-10-11 NOTE — PROGRESS NOTES
Chief Complaint   Patient presents with    Congestive Heart Failure     F/ V DX: Right heart failure with preserved right ventricular function (HCC)       Subjective:   Shelli Moreira is a 47 y.o. male who presents today for follow-up on his heart failure.    Current patient of the Heart failure clinic.  He was last seen in clinic on 9/6/2023.  During that visit, he was recommended to take torsemide 50 mg in the afternoon in addition to his 100 mg AM dose.    He reports no problems with the second dose.  He mentions he has been better about his sodium intake and has changed his diet.    He has lost about 15 pounds, he is currently weighing 343 pounds at home.    Patient also has started to become a little bit more active.  He is walking at a park near his home for about 30 minutes most days of the week.    Patient also reports he has been using his BiPAP regularly.    He denies chest pain, palpitations, orthopnea, PND, edema or dizziness/lightheadedness.  His shortness of breath with exertion has improved.    Additionally, patient has the following medical problems:    -hospitalization from 6/18/2021 through 6/23/2021.  Patient had presented with shortness of breath and weight gain.  He was a transfer from [facility].  Patient was found to have acute hypoxemic respiratory failure.  An echo was performed and patient was found to have right-sided heart failure.  Patient has a history of sleep apnea and obesity.  Likely his CPAP is not as effective.  Patient diuresed and discharged home.  Patient recommended to follow-up for repeat sleep study    -Hypertension    -Sleep apnea    Past Medical History:   Diagnosis Date    Asthma     Congestive heart failure (HCC)     Diabetes (HCC)     Hypertension     Sleep apnea      Past Surgical History:   Procedure Laterality Date    KNEE ARTHROSCOPY Left 2009     Family History   Problem Relation Age of Onset    Diabetes Mother     No Known Problems Sister       Social History     Socioeconomic History    Marital status:      Spouse name: Not on file    Number of children: Not on file    Years of education: Not on file    Highest education level: Not on file   Occupational History    Not on file   Tobacco Use    Smoking status: Never    Smokeless tobacco: Never    Tobacco comments:     vapes   Vaping Use    Vaping Use: Every day    Substances: THC    Devices: Disposable   Substance and Sexual Activity    Alcohol use: Not Currently    Drug use: Not Currently     Types: Inhaled, Marijuana     Comment: THC    Sexual activity: Not on file   Other Topics Concern    Not on file   Social History Narrative    Not on file     Social Determinants of Health     Financial Resource Strain: Not on file   Food Insecurity: Not on file   Transportation Needs: Not on file   Physical Activity: Not on file   Stress: Not on file   Social Connections: Not on file   Intimate Partner Violence: Not on file   Housing Stability: Not on file     No Known Allergies  Outpatient Encounter Medications as of 10/11/2023   Medication Sig Dispense Refill    fluticasone (FLONASE) 50 MCG/ACT nasal spray Administer 2 Sprays into affected nostril(S) every day. 16 g 0    torsemide (DEMADEX) 100 MG Tab Take 0.5-1 Tablets by mouth 2 times a day. 100 mg in AM, 50 mg in Afternoon 90 Tablet 5    amLODIPine (NORVASC) 10 MG Tab TAKE 1 TABLET BY MOUTH EVERY DAY 90 Tablet 0    potassium chloride SA (KDUR) 20 MEQ Tab CR Take 1 Tablet by mouth 2 times a day. 180 Tablet 1    JARDIANCE 10 MG Tab tablet TAKE 1 TABLET BY MOUTH EVERY DAY 90 Tablet 1    losartan (COZAAR) 100 MG Tab TAKE 1 TABLET BY MOUTH EVERY DAY 90 Tablet 1    metFORMIN (GLUCOPHAGE) 500 MG Tab Take 1 Tablet by mouth 2 times a day with meals. 180 Tablet 3    spironolactone (ALDACTONE) 50 MG Tab Take 1 Tablet by mouth every day. 90 Tablet 3    carvedilol (COREG) 25 MG Tab Take 1 Tablet by mouth 2 times a day with meals. 180 Tablet 3     No  "facility-administered encounter medications on file as of 10/11/2023.     Review of Systems   Constitutional:  Negative for fever and malaise/fatigue.   HENT:  Negative for congestion.    Respiratory:  Positive for shortness of breath. Negative for cough.    Cardiovascular:  Negative for chest pain, palpitations, orthopnea, claudication, leg swelling and PND.   Gastrointestinal:  Negative for abdominal pain.   Musculoskeletal:  Negative for myalgias.   Neurological:  Negative for dizziness.   Psychiatric/Behavioral:  The patient is not nervous/anxious and does not have insomnia.    All other systems reviewed and are negative.       Objective:   /60 (BP Location: Left arm, Patient Position: Sitting, BP Cuff Size: Adult)   Pulse 88   Resp 17   Ht 1.626 m (5' 4\")   Wt (!) 159 kg (351 lb 3.2 oz)   SpO2 91%   BMI 60.28 kg/m²     Physical Exam  Vitals reviewed.   Constitutional:       Appearance: He is well-developed. He is morbidly obese.   HENT:      Head: Normocephalic and atraumatic.   Eyes:      General: Lids are normal.      Pupils: Pupils are equal, round, and reactive to light.   Neck:      Vascular: No JVD.   Cardiovascular:      Rate and Rhythm: Normal rate and regular rhythm.      Heart sounds: Normal heart sounds.   Pulmonary:      Effort: Pulmonary effort is normal. No respiratory distress.      Breath sounds: Normal breath sounds. No wheezing or rales.   Abdominal:      General: Bowel sounds are normal.      Palpations: Abdomen is soft.   Musculoskeletal:         General: Normal range of motion.      Cervical back: Normal range of motion and neck supple.      Right lower leg: No edema.      Left lower leg: No edema.   Skin:     General: Skin is warm and dry.   Neurological:      General: No focal deficit present.      Mental Status: He is alert and oriented to person, place, and time.   Psychiatric:         Speech: Speech normal.         Behavior: Behavior normal.         Thought Content: Thought " content normal.         Judgment: Judgment normal.       Lab Results   Component Value Date/Time    CHOLSTRLTOT 110 06/19/2021 12:45 AM    LDL 51 06/19/2021 12:45 AM    HDL 44 06/19/2021 12:45 AM    TRIGLYCERIDE 73 06/19/2021 12:45 AM       Lab Results   Component Value Date/Time    SODIUM 137 10/07/2023 10:09 AM    POTASSIUM 4.2 10/07/2023 10:09 AM    CHLORIDE 97 10/07/2023 10:09 AM    CO2 27 10/07/2023 10:09 AM    GLUCOSE 89 10/07/2023 10:09 AM    BUN 22 10/07/2023 10:09 AM    CREATININE 1.33 10/07/2023 10:09 AM     Lab Results   Component Value Date/Time    ALKPHOSPHAT 110 (H) 03/24/2023 08:33 AM    ASTSGOT 14 03/24/2023 08:33 AM    ALTSGPT 15 03/24/2023 08:33 AM    TBILIRUBIN 0.4 03/24/2023 08:33 AM      Transthoracic Echo Report 6/18/2021  No prior study is available for comparison.   Normal left ventricular systolic function.   No evidence of valvular abnormality based on Doppler evaluation.   Severely dilated right ventricle. Normal right ventricular systolic   function.  Unable to estimate pulmonary artery pressure due to an inadequate   tricuspid regurgitant jet    Assessment:     1. HTN (hypertension), malignant  proBrain Natriuretic Peptide, NT    Basic Metabolic Panel      2. High risk medication use  proBrain Natriuretic Peptide, NT    Basic Metabolic Panel      3. Dyspnea on exertion  proBrain Natriuretic Peptide, NT    Basic Metabolic Panel      4. Pulmonary hypertension (HCC)        5. Morbid obesity with BMI of 60.0-69.9, adult (HCC)        6. Right ventricular dysfunction, NYHA class 3        7. Obstructive sleep apnea              Medical Decision Making:  Today's Assessment / Status / Plan:   1.  Right-sided heart failure versus, obesity hypoventilation syndrome:  -Reviewed ER visit at [facility] in care everywhere.  Echo results reviewed  -Patient lower extremity edema has improved.  He is losing weight and he is feeling better.  -Continue torsemide 100 mg in the a.m. and 50 mg in the  afternoon  -Continue spironolactone 50 mg daily  -Continue potassium 20 mEq twice a day for now  -Continue Jardiance 10 mg daily   -Patient to repeat a BMP and NT proBNP in 3 months  -Reinforced s/sx of worsening symptoms with patient and weight monitoring. Pt verbalizes understanding. Pt to call office or RTC if present.   -Continue low-sodium diet    2.  Sleep apnea:  -Continue BiPAP use  -Encouraged to follow up with Sleep medicine provider     3.  Obesity: BMI 60.28 weight  -Congratulated patient on his current weight loss, encouraged continued weight loss    4.  Hypertension: Stable  -Continue amlodipine 10 mg daily  -Continue carvedilol 25 mg twice a day  -Continue losartan 100 mg Daily    FU in clinic in 3 months with labs. Sooner if needed.    Patient verbalizes understanding and agrees with the plan of care.     PLEASE NOTE: This Note was created using voice recognition Software. I have made every reasonable attempt to correct obvious errors, but I expect that there are errors of grammar and possibly content that I did not discover before finalizing the note

## 2023-10-15 DIAGNOSIS — I10 HTN (HYPERTENSION), MALIGNANT: ICD-10-CM

## 2023-10-16 NOTE — TELEPHONE ENCOUNTER
Is the patient due for a refill? Yes    Was the patient seen the past year? Yes    Date of last office visit: 10/11/23     Does the patient have an upcoming appointment?  No    Provider to refill:SHELLY    Does the patients insurance require a 100 day supply?  No

## 2023-10-17 RX ORDER — CARVEDILOL 25 MG/1
25 TABLET ORAL 2 TIMES DAILY WITH MEALS
Qty: 180 TABLET | Refills: 3 | Status: SHIPPED | OUTPATIENT
Start: 2023-10-17 | End: 2023-10-24 | Stop reason: SDUPTHER

## 2023-10-24 ENCOUNTER — TELEPHONE (OUTPATIENT)
Dept: CARDIOLOGY | Facility: MEDICAL CENTER | Age: 48
End: 2023-10-24
Payer: COMMERCIAL

## 2023-10-24 DIAGNOSIS — I10 HTN (HYPERTENSION), MALIGNANT: ICD-10-CM

## 2023-10-24 DIAGNOSIS — I51.89 RIGHT VENTRICULAR DYSFUNCTION, NYHA CLASS 3: ICD-10-CM

## 2023-10-24 DIAGNOSIS — I50.810 RIGHT HEART FAILURE WITH PRESERVED RIGHT VENTRICULAR FUNCTION (HCC): ICD-10-CM

## 2023-10-24 DIAGNOSIS — R06.09 DYSPNEA ON EXERTION: ICD-10-CM

## 2023-10-24 DIAGNOSIS — I27.20 PULMONARY HYPERTENSION (HCC): ICD-10-CM

## 2023-10-24 RX ORDER — AMLODIPINE BESYLATE 10 MG/1
10 TABLET ORAL DAILY
Qty: 90 TABLET | Refills: 3 | Status: SHIPPED | OUTPATIENT
Start: 2023-10-24 | End: 2023-11-28

## 2023-10-24 RX ORDER — LOSARTAN POTASSIUM 100 MG/1
100 TABLET ORAL DAILY
Qty: 90 TABLET | Refills: 3 | Status: SHIPPED | OUTPATIENT
Start: 2023-10-24

## 2023-10-24 RX ORDER — SPIRONOLACTONE 50 MG/1
50 TABLET, FILM COATED ORAL DAILY
Qty: 90 TABLET | Refills: 3 | Status: SHIPPED | OUTPATIENT
Start: 2023-10-24 | End: 2023-10-31

## 2023-10-24 RX ORDER — CARVEDILOL 25 MG/1
25 TABLET ORAL 2 TIMES DAILY WITH MEALS
Qty: 180 TABLET | Refills: 3 | Status: SHIPPED | OUTPATIENT
Start: 2023-10-24

## 2023-10-24 RX ORDER — EMPAGLIFLOZIN 10 MG/1
10 TABLET, FILM COATED ORAL DAILY
Qty: 90 TABLET | Refills: 3 | Status: SHIPPED | OUTPATIENT
Start: 2023-10-24 | End: 2023-12-01 | Stop reason: SDUPTHER

## 2023-10-24 RX ORDER — TORSEMIDE 100 MG/1
TABLET ORAL
Qty: 135 TABLET | Refills: 3 | Status: SHIPPED | OUTPATIENT
Start: 2023-10-24

## 2023-10-24 RX ORDER — POTASSIUM CHLORIDE 20 MEQ/1
20 TABLET, EXTENDED RELEASE ORAL 2 TIMES DAILY
Qty: 180 TABLET | Refills: 3 | Status: SHIPPED | OUTPATIENT
Start: 2023-10-24

## 2023-10-24 NOTE — TELEPHONE ENCOUNTER
Metformin  Received: Today  Jasmine Thomason, PhT  CLAUDIA Arias,     [facility] no  longer takes [employer]  insurance. He is wondering if Eliza would be willing to sign off on a new rx to be routed to [facility].     Thank you   Jasmine   ------------------------------------------------------------------------------------------------    Metformin is not a cardiac med and should be refilled by PCP, Mansi message sent to pt.

## 2023-10-28 ENCOUNTER — HOSPITAL ENCOUNTER (OUTPATIENT)
Dept: LAB | Facility: MEDICAL CENTER | Age: 48
End: 2023-10-28
Attending: STUDENT IN AN ORGANIZED HEALTH CARE EDUCATION/TRAINING PROGRAM
Payer: COMMERCIAL

## 2023-10-28 DIAGNOSIS — Z00.00 HEALTHCARE MAINTENANCE: ICD-10-CM

## 2023-10-28 DIAGNOSIS — E11.9 TYPE 2 DIABETES MELLITUS WITHOUT COMPLICATION, WITHOUT LONG-TERM CURRENT USE OF INSULIN (HCC): ICD-10-CM

## 2023-10-28 LAB
25(OH)D3 SERPL-MCNC: 13 NG/ML (ref 30–100)
ALBUMIN SERPL BCP-MCNC: 4 G/DL (ref 3.2–4.9)
ALBUMIN/GLOB SERPL: 1.1 G/DL
ALP SERPL-CCNC: 104 U/L (ref 30–99)
ALT SERPL-CCNC: 16 U/L (ref 2–50)
ANION GAP SERPL CALC-SCNC: 12 MMOL/L (ref 7–16)
AST SERPL-CCNC: 15 U/L (ref 12–45)
BASOPHILS # BLD AUTO: 0.6 % (ref 0–1.8)
BASOPHILS # BLD: 0.05 K/UL (ref 0–0.12)
BILIRUB SERPL-MCNC: 0.4 MG/DL (ref 0.1–1.5)
BUN SERPL-MCNC: 22 MG/DL (ref 8–22)
CALCIUM ALBUM COR SERPL-MCNC: 9.5 MG/DL (ref 8.5–10.5)
CALCIUM SERPL-MCNC: 9.5 MG/DL (ref 8.5–10.5)
CHLORIDE SERPL-SCNC: 96 MMOL/L (ref 96–112)
CHOLEST SERPL-MCNC: 135 MG/DL (ref 100–199)
CO2 SERPL-SCNC: 28 MMOL/L (ref 20–33)
CREAT SERPL-MCNC: 1.28 MG/DL (ref 0.5–1.4)
CREAT UR-MCNC: 79.17 MG/DL
EOSINOPHIL # BLD AUTO: 0.07 K/UL (ref 0–0.51)
EOSINOPHIL NFR BLD: 0.8 % (ref 0–6.9)
ERYTHROCYTE [DISTWIDTH] IN BLOOD BY AUTOMATED COUNT: 49.1 FL (ref 35.9–50)
EST. AVERAGE GLUCOSE BLD GHB EST-MCNC: 126 MG/DL
FASTING STATUS PATIENT QL REPORTED: NORMAL
GFR SERPLBLD CREATININE-BSD FMLA CKD-EPI: 69 ML/MIN/1.73 M 2
GLOBULIN SER CALC-MCNC: 3.8 G/DL (ref 1.9–3.5)
GLUCOSE SERPL-MCNC: 79 MG/DL (ref 65–99)
HBA1C MFR BLD: 6 % (ref 4–5.6)
HCT VFR BLD AUTO: 53 % (ref 42–52)
HDLC SERPL-MCNC: 37 MG/DL
HGB BLD-MCNC: 16.2 G/DL (ref 14–18)
IMM GRANULOCYTES # BLD AUTO: 0.01 K/UL (ref 0–0.11)
IMM GRANULOCYTES NFR BLD AUTO: 0.1 % (ref 0–0.9)
LDLC SERPL CALC-MCNC: 70 MG/DL
LYMPHOCYTES # BLD AUTO: 3.02 K/UL (ref 1–4.8)
LYMPHOCYTES NFR BLD: 33.7 % (ref 22–41)
MCH RBC QN AUTO: 26.4 PG (ref 27–33)
MCHC RBC AUTO-ENTMCNC: 30.6 G/DL (ref 32.3–36.5)
MCV RBC AUTO: 86.5 FL (ref 81.4–97.8)
MICROALBUMIN UR-MCNC: 5 MG/DL
MICROALBUMIN/CREAT UR: 63 MG/G (ref 0–30)
MONOCYTES # BLD AUTO: 0.53 K/UL (ref 0–0.85)
MONOCYTES NFR BLD AUTO: 5.9 % (ref 0–13.4)
NEUTROPHILS # BLD AUTO: 5.29 K/UL (ref 1.82–7.42)
NEUTROPHILS NFR BLD: 58.9 % (ref 44–72)
NRBC # BLD AUTO: 0 K/UL
NRBC BLD-RTO: 0 /100 WBC (ref 0–0.2)
PLATELET # BLD AUTO: 324 K/UL (ref 164–446)
PMV BLD AUTO: 10.1 FL (ref 9–12.9)
POTASSIUM SERPL-SCNC: 4.1 MMOL/L (ref 3.6–5.5)
PROT SERPL-MCNC: 7.8 G/DL (ref 6–8.2)
RBC # BLD AUTO: 6.13 M/UL (ref 4.7–6.1)
SODIUM SERPL-SCNC: 136 MMOL/L (ref 135–145)
TRIGL SERPL-MCNC: 140 MG/DL (ref 0–149)
TSH SERPL DL<=0.005 MIU/L-ACNC: 1.81 UIU/ML (ref 0.38–5.33)
WBC # BLD AUTO: 9 K/UL (ref 4.8–10.8)

## 2023-10-28 PROCEDURE — 82570 ASSAY OF URINE CREATININE: CPT

## 2023-10-28 PROCEDURE — 85025 COMPLETE CBC W/AUTO DIFF WBC: CPT

## 2023-10-28 PROCEDURE — 80061 LIPID PANEL: CPT

## 2023-10-28 PROCEDURE — 82306 VITAMIN D 25 HYDROXY: CPT

## 2023-10-28 PROCEDURE — 83036 HEMOGLOBIN GLYCOSYLATED A1C: CPT

## 2023-10-28 PROCEDURE — 80053 COMPREHEN METABOLIC PANEL: CPT

## 2023-10-28 PROCEDURE — 36415 COLL VENOUS BLD VENIPUNCTURE: CPT

## 2023-10-28 PROCEDURE — 84443 ASSAY THYROID STIM HORMONE: CPT

## 2023-10-28 PROCEDURE — 82043 UR ALBUMIN QUANTITATIVE: CPT

## 2023-10-31 DIAGNOSIS — I51.89 RIGHT VENTRICULAR DYSFUNCTION, NYHA CLASS 3: ICD-10-CM

## 2023-10-31 DIAGNOSIS — I27.20 PULMONARY HYPERTENSION (HCC): ICD-10-CM

## 2023-10-31 DIAGNOSIS — I10 HTN (HYPERTENSION), MALIGNANT: ICD-10-CM

## 2023-10-31 DIAGNOSIS — R06.09 DYSPNEA ON EXERTION: ICD-10-CM

## 2023-10-31 DIAGNOSIS — I50.810 RIGHT HEART FAILURE WITH PRESERVED RIGHT VENTRICULAR FUNCTION (HCC): ICD-10-CM

## 2023-10-31 RX ORDER — SPIRONOLACTONE 50 MG/1
50 TABLET, FILM COATED ORAL DAILY
Qty: 90 TABLET | Refills: 3 | Status: SHIPPED | OUTPATIENT
Start: 2023-10-31

## 2023-11-01 ENCOUNTER — OFFICE VISIT (OUTPATIENT)
Dept: MEDICAL GROUP | Facility: LAB | Age: 48
End: 2023-11-01
Payer: COMMERCIAL

## 2023-11-01 ENCOUNTER — TELEPHONE (OUTPATIENT)
Dept: MEDICAL GROUP | Facility: LAB | Age: 48
End: 2023-11-01

## 2023-11-01 VITALS
OXYGEN SATURATION: 92 % | HEART RATE: 102 BPM | TEMPERATURE: 97.6 F | DIASTOLIC BLOOD PRESSURE: 68 MMHG | SYSTOLIC BLOOD PRESSURE: 122 MMHG | RESPIRATION RATE: 20 BRPM | BODY MASS INDEX: 53.78 KG/M2 | HEIGHT: 64 IN | WEIGHT: 315 LBS

## 2023-11-01 DIAGNOSIS — Z23 NEED FOR VACCINATION: ICD-10-CM

## 2023-11-01 DIAGNOSIS — E66.01 MORBID OBESITY (HCC): ICD-10-CM

## 2023-11-01 DIAGNOSIS — E11.9 TYPE 2 DIABETES MELLITUS WITHOUT COMPLICATION, WITHOUT LONG-TERM CURRENT USE OF INSULIN (HCC): ICD-10-CM

## 2023-11-01 DIAGNOSIS — E55.9 VITAMIN D DEFICIENCY: ICD-10-CM

## 2023-11-01 PROCEDURE — 3078F DIAST BP <80 MM HG: CPT | Performed by: STUDENT IN AN ORGANIZED HEALTH CARE EDUCATION/TRAINING PROGRAM

## 2023-11-01 PROCEDURE — 90686 IIV4 VACC NO PRSV 0.5 ML IM: CPT | Performed by: STUDENT IN AN ORGANIZED HEALTH CARE EDUCATION/TRAINING PROGRAM

## 2023-11-01 PROCEDURE — 90471 IMMUNIZATION ADMIN: CPT | Performed by: STUDENT IN AN ORGANIZED HEALTH CARE EDUCATION/TRAINING PROGRAM

## 2023-11-01 PROCEDURE — 99396 PREV VISIT EST AGE 40-64: CPT | Mod: 25 | Performed by: STUDENT IN AN ORGANIZED HEALTH CARE EDUCATION/TRAINING PROGRAM

## 2023-11-01 PROCEDURE — 3074F SYST BP LT 130 MM HG: CPT | Performed by: STUDENT IN AN ORGANIZED HEALTH CARE EDUCATION/TRAINING PROGRAM

## 2023-11-01 RX ORDER — TIRZEPATIDE 2.5 MG/.5ML
1 INJECTION, SOLUTION SUBCUTANEOUS
Qty: 2 ML | Refills: 0 | Status: SHIPPED | OUTPATIENT
Start: 2023-11-01 | End: 2024-01-24

## 2023-11-01 ASSESSMENT — ENCOUNTER SYMPTOMS
CHILLS: 0
VOMITING: 0
PALPITATIONS: 0
COUGH: 0
FEVER: 0
ABDOMINAL PAIN: 0
SPUTUM PRODUCTION: 0
BLOOD IN STOOL: 0

## 2023-11-01 ASSESSMENT — FIBROSIS 4 INDEX: FIB4 SCORE: 0.54

## 2023-11-01 NOTE — TELEPHONE ENCOUNTER
MEDICATION PRIOR AUTHORIZATION NEEDED:    1. Name of Medication: Mounjaro     2. Requested By (Name of Pharmacy): [facility]     3. Is insurance on file current? yes    4. What is the name & phone number of the 3rd party payor?  [id number])

## 2023-11-01 NOTE — PROGRESS NOTES
Subjective:     Subjective:     CC:   Chief Complaint   Patient presents with    Annual Wellness Visit       HPI:   Shelli Moreira is a 47 y.o. male who presents for annual exam    Last Colorectal Cancer Screening: unable to recall exact time  Last Tdap: 20222  Received HPV series: No  Hx STDs: No    Exercise: minimal exercise, one hour walking weekly  Diet: eating more salads, cut down on his junk food. Drinks only water and tea    He  has a past medical history of Asthma, Congestive heart failure ([facility]), Diabetes ([facility]), Hypertension, and Sleep apnea.  He  has a past surgical history that includes knee arthroscopy (Left, 2009).    Family History   Problem Relation Age of Onset    Diabetes Mother     No Known Problems Sister      Social History     Tobacco Use    Smoking status: Never    Smokeless tobacco: Never    Tobacco comments:     vapes   Vaping Use    Vaping Use: Every day    Substances: THC    Devices: Disposable   Substance Use Topics    Alcohol use: Not Currently    Drug use: Not Currently     Types: Inhaled, Marijuana     Comment: THC     He  has no history on file for sexual activity.    Patient Active Problem List    Diagnosis Date Noted    Vitamin D deficiency 11/01/2023    Syncope 08/30/2023    Ganglion cyst 12/07/2022    Type 2 diabetes mellitus without complication, without long-term current use of insulin ([facility]) 06/21/2021    Right heart failure with preserved right ventricular function ([facility]) 06/19/2021    Obstructive sleep apnea 06/19/2021    Morbid obesity ([facility]) 06/19/2021    Hypertension 06/19/2021    Carpal tunnel syndrome 12/07/2005     Current Outpatient Medications   Medication Sig Dispense Refill    Tirzepatide (MOUNJARO) 2.5 MG/0.5ML Solution Pen-injector Inject 1 Application under the skin every 7 days. 2 mL 0    spironolactone (ALDACTONE) 50 MG Tab TAKE 1 TABLET BY MOUTH EVERY DAY 90 Tablet 3    carvedilol (COREG) 25 MG Tab Take 1 Tablet by mouth 2 times a day with meals. 180 Tablet 3     "torsemide (DEMADEX) 100 MG Tab Take 1 tab (100 mg) in AM by mouth and 1/2 tab (50 mg) in afternoon 135 Tablet 3    amLODIPine (NORVASC) 10 MG Tab Take 1 Tablet by mouth every day. 90 Tablet 3    potassium chloride SA (KDUR) 20 MEQ Tab CR Take 1 Tablet by mouth 2 times a day. 180 Tablet 3    Empagliflozin (JARDIANCE) 10 MG Tab tablet Take 1 Tablet by mouth every day. 90 Tablet 3    losartan (COZAAR) 100 MG Tab Take 1 Tablet by mouth every day. 90 Tablet 3    fluticasone (FLONASE) 50 MCG/ACT nasal spray Administer 2 Sprays into affected nostril(S) every day. 16 g 0    metFORMIN (GLUCOPHAGE) 500 MG Tab Take 1 Tablet by mouth 2 times a day with meals. 180 Tablet 3     No current facility-administered medications for this visit.     No Known Allergies    Review of Systems   Review of Systems   Constitutional:  Negative for chills and fever.   Respiratory:  Negative for cough and sputum production.    Cardiovascular:  Negative for chest pain and palpitations.   Gastrointestinal:  Negative for abdominal pain, blood in stool and vomiting.        Objective:   /68 (BP Location: Right arm, Patient Position: Sitting, BP Cuff Size: Large adult)   Pulse (!) 102   Temp 36.4 °C (97.6 °F)   Resp 20   Ht 1.626 m (5' 4\")   Wt (!) 157 kg (345 lb 9.6 oz)   SpO2 92%   BMI 59.32 kg/m²      Wt Readings from Last 4 Encounters:   11/01/23 (!) 157 kg (345 lb 9.6 oz)   10/11/23 (!) 159 kg (351 lb 3.2 oz)   09/06/23 (!) 166 kg (366 lb)   08/30/23 (!) 164 kg (362 lb 3.5 oz)           Physical Exam:  Physical Exam  Constitutional:       General: He is not in acute distress.     Appearance: He is not ill-appearing.   Pulmonary:      Effort: Pulmonary effort is normal.   Neurological:      Mental Status: He is alert.      Comments: Monofilament testing b/l wnl    Psychiatric:         Mood and Affect: Mood normal.         Behavior: Behavior normal.         Thought Content: Thought content normal.         Judgment: Judgment normal. "             Assessment and Plan:     Problem List Items Addressed This Visit       Morbid obesity (HCC)    Relevant Medications    Tirzepatide (MOUNJARO) 2.5 MG/0.5ML Solution Pen-injector    Type 2 diabetes mellitus without complication, without long-term current use of insulin (HCC)     Chronic  -patient is on metformin and jardiance already   -he will benefit from a third medication called moujaro to further control his blood sugar levels.           Relevant Medications    Tirzepatide (MOUNJARO) 2.5 MG/0.5ML Solution Pen-injector    Other Relevant Orders    Referral to Ophthalmology    Diabetic Monofilament LE Exam (Completed)    Vitamin D deficiency     Chronic  -recommended patient to take vit d3 5000 units daily           Other Visit Diagnoses       Need for vaccination        Relevant Orders    INFLUENZA VACCINE QUAD INJ (PF)             Health maintenance:    Labs per orders  Immunizations per orders  Age-appropriate industry guidance discussed including diet and exercise  Discussed diet and exercise     Follow-up: follow up in 2-3 months

## 2023-11-01 NOTE — ASSESSMENT & PLAN NOTE
Chronic  -patient is on metformin and jardiance already   -he will benefit from a third medication called moujaro to further control his blood sugar levels.

## 2023-11-28 DIAGNOSIS — I50.810 RIGHT HEART FAILURE WITH PRESERVED RIGHT VENTRICULAR FUNCTION (HCC): ICD-10-CM

## 2023-11-28 RX ORDER — AMLODIPINE BESYLATE 10 MG/1
10 TABLET ORAL DAILY
Qty: 90 TABLET | Refills: 0 | Status: SHIPPED | OUTPATIENT
Start: 2023-11-28

## 2023-11-28 NOTE — TELEPHONE ENCOUNTER
Is the patient due for a refill? Yes    Was the patient seen the past year? Yes    Date of last office visit: 10/11/2023    Does the patient have an upcoming appointment?  No    Provider to refill:SHELLY    Does the patients insurance require a 100 day supply?  No

## 2023-12-01 DIAGNOSIS — I51.89 RIGHT VENTRICULAR DYSFUNCTION, NYHA CLASS 3: ICD-10-CM

## 2023-12-01 DIAGNOSIS — I27.20 PULMONARY HYPERTENSION (HCC): ICD-10-CM

## 2023-12-01 DIAGNOSIS — R06.09 DYSPNEA ON EXERTION: ICD-10-CM

## 2023-12-01 RX ORDER — EMPAGLIFLOZIN 10 MG/1
10 TABLET, FILM COATED ORAL DAILY
Qty: 90 TABLET | Refills: 3 | Status: SHIPPED | OUTPATIENT
Start: 2023-12-01

## 2023-12-01 NOTE — TELEPHONE ENCOUNTER
Received request via: Patient    Was the patient seen in the last year in this department? Yes  11/1/23  Does the patient have an active prescription (recently filled or refills available) for medication(s) requested? No    Does the patient have [facility] and need 100 day supply (blood pressure, diabetes and cholesterol meds only)? Medication is not for cholesterol, blood pressure or diabetes

## 2024-01-17 ENCOUNTER — TELEPHONE (OUTPATIENT)
Dept: MEDICAL GROUP | Facility: LAB | Age: 49
End: 2024-01-17
Payer: COMMERCIAL

## 2024-01-17 NOTE — TELEPHONE ENCOUNTER
MEDICATION PRIOR AUTHORIZATION NEEDED:    1. Name of Medication: mounjaro    2. Requested By (Name of Pharmacy): [facility]     3. Is insurance on file current? Yes    4. What is the name & phone number of the 3rd party payor? [id number]

## 2024-01-24 ENCOUNTER — TELEPHONE (OUTPATIENT)
Dept: MEDICAL GROUP | Facility: LAB | Age: 49
End: 2024-01-24
Payer: COMMERCIAL

## 2024-01-24 DIAGNOSIS — E11.9 TYPE 2 DIABETES MELLITUS WITHOUT COMPLICATION, WITHOUT LONG-TERM CURRENT USE OF INSULIN (HCC): ICD-10-CM

## 2024-01-24 RX ORDER — SEMAGLUTIDE 0.68 MG/ML
0.25 INJECTION, SOLUTION SUBCUTANEOUS
Qty: 3 ML | Refills: 0 | Status: SHIPPED | OUTPATIENT
Start: 2024-01-24

## 2024-01-25 ENCOUNTER — TELEPHONE (OUTPATIENT)
Dept: MEDICAL GROUP | Facility: LAB | Age: 49
End: 2024-01-25
Payer: COMMERCIAL

## 2024-01-25 NOTE — TELEPHONE ENCOUNTER
MEDICATION PRIOR AUTHORIZATION NEEDED:    1. Name of Medication: Ozempic    2. Requested By (Name of Pharmacy): [facility]    3. Is insurance on file current? yes    4. What is the name & phone number of the 3rd party payor? (Key: [id number])     Dosing Information  Ozempic 0.25 or 0.5 mg/dose comes in packs of one 1.5 mL pens (total of 1.5 mL). Quantity is typically:  1.5 mL per 28 days (Note: initiation is technically 42 days, but most plans pay for only 34 or less day supplies)  3 mL per 84 days    Ozempic 1 mg/dose comes in packs of two 1.5 mL pens (total of 3 mL). Quantity is typically:  3 mL per 28 days  9 mL per 84 days    Ozempic 2 mg/dose comes in packs of one 3 mL pens (total of 3 mL). Quantity is typically:  3 mL per 28 days  9 mL per 84 days

## 2024-01-25 NOTE — TELEPHONE ENCOUNTER
Request Reference Number: [id number]. MOUNJARO INJ 2.5/0.5 is denied for not meeting the prior authorization requirement(s). Details of this decision are in the notice attached below or have been faxed to you.

## 2024-01-31 ENCOUNTER — OFFICE VISIT (OUTPATIENT)
Dept: MEDICAL GROUP | Facility: LAB | Age: 49
End: 2024-01-31
Payer: COMMERCIAL

## 2024-01-31 VITALS
BODY MASS INDEX: 53.78 KG/M2 | DIASTOLIC BLOOD PRESSURE: 68 MMHG | HEIGHT: 64 IN | HEART RATE: 82 BPM | TEMPERATURE: 96.9 F | OXYGEN SATURATION: 90 % | SYSTOLIC BLOOD PRESSURE: 126 MMHG | RESPIRATION RATE: 18 BRPM | WEIGHT: 315 LBS

## 2024-01-31 DIAGNOSIS — E66.01 MORBID OBESITY (HCC): ICD-10-CM

## 2024-01-31 DIAGNOSIS — E11.9 TYPE 2 DIABETES MELLITUS WITHOUT COMPLICATION, WITHOUT LONG-TERM CURRENT USE OF INSULIN (HCC): ICD-10-CM

## 2024-01-31 PROCEDURE — 3074F SYST BP LT 130 MM HG: CPT | Performed by: STUDENT IN AN ORGANIZED HEALTH CARE EDUCATION/TRAINING PROGRAM

## 2024-01-31 PROCEDURE — 3078F DIAST BP <80 MM HG: CPT | Performed by: STUDENT IN AN ORGANIZED HEALTH CARE EDUCATION/TRAINING PROGRAM

## 2024-01-31 PROCEDURE — 99214 OFFICE O/P EST MOD 30 MIN: CPT | Performed by: STUDENT IN AN ORGANIZED HEALTH CARE EDUCATION/TRAINING PROGRAM

## 2024-01-31 RX ORDER — SEMAGLUTIDE 0.68 MG/ML
0.5 INJECTION, SOLUTION SUBCUTANEOUS
Qty: 3 ML | Refills: 0 | Status: SHIPPED | OUTPATIENT
Start: 2024-02-28

## 2024-01-31 ASSESSMENT — ENCOUNTER SYMPTOMS
FEVER: 0
SHORTNESS OF BREATH: 0
CHILLS: 0

## 2024-01-31 ASSESSMENT — FIBROSIS 4 INDEX: FIB4 SCORE: .5555555555555555556

## 2024-01-31 ASSESSMENT — PATIENT HEALTH QUESTIONNAIRE - PHQ9: CLINICAL INTERPRETATION OF PHQ2 SCORE: 0

## 2024-01-31 NOTE — PROGRESS NOTES
Subjective:     CC: established patient     HPI:   Shelli presents today for the following;    Problem   Type 2 Diabetes Mellitus Without Complication, Without Long-Term Current Use of Insulin (Hcc)    Last A1c was in December 2022 6.2.  Patient is on Jardiance 10 mg along with metformin 500 mg twice daily    1/31/24  -patients insurance did not approve mounjaro but recommended started ozempic instead      Morbid Obesity (Hcc)    Patient has been working on his diet and exercise and has dropped weight. He currently weighs 338lbs with clothes and BMI of 58.15. Patient in October weighed 351lbs, BMI 60.28.     Patient has increased 3,000 steps/day          Current Outpatient Medications Ordered in Epic   Medication Sig Dispense Refill    metFORMIN (GLUCOPHAGE) 500 MG Tab Take 1 Tablet by mouth 2 times a day with meals. 180 Tablet 3    [START ON 2/28/2024] Semaglutide,0.25 or 0.5MG/DOS, (OZEMPIC, 0.25 OR 0.5 MG/DOSE,) 2 MG/3ML Solution Pen-injector Inject 0.5 mg under the skin every 7 days. 3 mL 0    Semaglutide,0.25 or 0.5MG/DOS, (OZEMPIC, 0.25 OR 0.5 MG/DOSE,) 2 MG/3ML Solution Pen-injector Inject 0.25 mg under the skin every 7 days. 3 mL 0    Empagliflozin (JARDIANCE) 10 MG Tab tablet Take 1 Tablet by mouth every day. 90 Tablet 3    amLODIPine (NORVASC) 10 MG Tab TAKE 1 TABLET BY MOUTH EVERY DAY 90 Tablet 0    spironolactone (ALDACTONE) 50 MG Tab TAKE 1 TABLET BY MOUTH EVERY DAY 90 Tablet 3    carvedilol (COREG) 25 MG Tab Take 1 Tablet by mouth 2 times a day with meals. 180 Tablet 3    torsemide (DEMADEX) 100 MG Tab Take 1 tab (100 mg) in AM by mouth and 1/2 tab (50 mg) in afternoon 135 Tablet 3    potassium chloride SA (KDUR) 20 MEQ Tab CR Take 1 Tablet by mouth 2 times a day. 180 Tablet 3    losartan (COZAAR) 100 MG Tab Take 1 Tablet by mouth every day. 90 Tablet 3    fluticasone (FLONASE) 50 MCG/ACT nasal spray Administer 2 Sprays into affected nostril(S) every day. 16 g 0     No current Epic-ordered  "facility-administered medications on file.           ROS:  Review of Systems   Constitutional:  Negative for chills and fever.   Respiratory:  Negative for shortness of breath.    Cardiovascular:  Negative for chest pain.       Objective:     Exam:  /68 (BP Location: Right arm, Patient Position: Sitting, BP Cuff Size: Large adult)   Pulse 82   Temp 36.1 °C (96.9 °F)   Resp 18   Ht 1.626 m (5' 4\")   Wt (!) 154 kg (338 lb 12.8 oz)   SpO2 90%   BMI 58.15 kg/m²  Body mass index is 58.15 kg/m².    Physical Exam  Constitutional:       General: He is not in acute distress.     Appearance: He is not ill-appearing.   Pulmonary:      Effort: Pulmonary effort is normal.   Neurological:      Mental Status: He is alert.   Psychiatric:         Mood and Affect: Mood normal.         Behavior: Behavior normal.         Thought Content: Thought content normal.         Judgment: Judgment normal.               Assessment & Plan:     Problem List Items Addressed This Visit       Morbid obesity (HCC)     Chronic-improving  -continue positive life style modifications            Relevant Medications    metFORMIN (GLUCOPHAGE) 500 MG Tab    Semaglutide,0.25 or 0.5MG/DOS, (OZEMPIC, 0.25 OR 0.5 MG/DOSE,) 2 MG/3ML Solution Pen-injector (Start on 2/28/2024)    Type 2 diabetes mellitus without complication, without long-term current use of insulin (HCC)     Chronic-stable  -continue metfomrin, jardiance, and ozempic 0.25mg, will increase after 4 weeks          Relevant Medications    metFORMIN (GLUCOPHAGE) 500 MG Tab    Semaglutide,0.25 or 0.5MG/DOS, (OZEMPIC, 0.25 OR 0.5 MG/DOSE,) 2 MG/3ML Solution Pen-injector (Start on 2/28/2024)       3 month follow up     Please note that this dictation was created using voice recognition software. I have made every reasonable attempt to correct obvious errors, but I expect that there are errors of grammar and possibly content that I did not discover before finalizing the note.        "

## 2024-04-26 DIAGNOSIS — E11.9 TYPE 2 DIABETES MELLITUS WITHOUT COMPLICATION, WITHOUT LONG-TERM CURRENT USE OF INSULIN (HCC): ICD-10-CM

## 2024-04-26 RX ORDER — SEMAGLUTIDE 1.34 MG/ML
1 INJECTION, SOLUTION SUBCUTANEOUS
Qty: 3 ML | Refills: 0 | Status: SHIPPED | OUTPATIENT
Start: 2024-04-26 | End: 2024-05-24

## 2024-04-26 RX ORDER — SEMAGLUTIDE 2.68 MG/ML
2 INJECTION, SOLUTION SUBCUTANEOUS
Qty: 3 ML | Refills: 3 | Status: SHIPPED | OUTPATIENT
Start: 2024-05-25

## 2024-04-26 RX ORDER — SEMAGLUTIDE 0.68 MG/ML
0.5 INJECTION, SOLUTION SUBCUTANEOUS
Qty: 3 ML | Refills: 0 | OUTPATIENT
Start: 2024-04-26

## 2024-04-26 NOTE — TELEPHONE ENCOUNTER
Received request via: Pharmacy    Was the patient seen in the last year in this department? Yes    Does the patient have an active prescription (recently filled or refills available) for medication(s) requested? No    Pharmacy Name: [facility]    Does the patient have [facility] and need 100 day supply (blood pressure, diabetes and cholesterol meds only)? Patient does not have SCP

## 2024-09-17 DIAGNOSIS — E11.9 TYPE 2 DIABETES MELLITUS WITHOUT COMPLICATION, WITHOUT LONG-TERM CURRENT USE OF INSULIN (HCC): ICD-10-CM

## 2024-09-18 RX ORDER — SEMAGLUTIDE 2.68 MG/ML
2 INJECTION, SOLUTION SUBCUTANEOUS
Qty: 3 ML | Refills: 3 | Status: SHIPPED | OUTPATIENT
Start: 2024-09-18

## 2024-09-18 NOTE — TELEPHONE ENCOUNTER
Received request via: Pharmacy    Was the patient seen in the last year in this department? Yes    Does the patient have an active prescription (recently filled or refills available) for medication(s) requested? No    Pharmacy Name: [facility]    Does the patient have [facility] and need 100-day supply? (This applies to ALL medications) Patient does not have SCP

## 2024-10-31 DIAGNOSIS — I51.89 RIGHT VENTRICULAR DYSFUNCTION, NYHA CLASS 3: ICD-10-CM

## 2024-10-31 DIAGNOSIS — R06.09 DYSPNEA ON EXERTION: ICD-10-CM

## 2024-10-31 DIAGNOSIS — I10 HTN (HYPERTENSION), MALIGNANT: ICD-10-CM

## 2024-10-31 DIAGNOSIS — I50.810 RIGHT HEART FAILURE WITH PRESERVED RIGHT VENTRICULAR FUNCTION (HCC): ICD-10-CM

## 2024-10-31 DIAGNOSIS — I27.20 PULMONARY HYPERTENSION (HCC): ICD-10-CM

## 2024-11-01 DIAGNOSIS — R06.09 DYSPNEA ON EXERTION: ICD-10-CM

## 2024-11-01 DIAGNOSIS — I27.20 PULMONARY HYPERTENSION (HCC): ICD-10-CM

## 2024-11-01 DIAGNOSIS — I10 HTN (HYPERTENSION), MALIGNANT: ICD-10-CM

## 2024-11-01 DIAGNOSIS — I51.89 RIGHT VENTRICULAR DYSFUNCTION, NYHA CLASS 3: ICD-10-CM

## 2024-11-01 DIAGNOSIS — I50.810 RIGHT HEART FAILURE WITH PRESERVED RIGHT VENTRICULAR FUNCTION (HCC): ICD-10-CM

## 2024-11-01 RX ORDER — SPIRONOLACTONE 50 MG/1
50 TABLET, FILM COATED ORAL DAILY
Qty: 90 TABLET | Refills: 3 | OUTPATIENT
Start: 2024-11-01

## 2024-11-01 RX ORDER — SPIRONOLACTONE 50 MG/1
50 TABLET, FILM COATED ORAL DAILY
Qty: 90 TABLET | Refills: 0 | Status: SHIPPED | OUTPATIENT
Start: 2024-11-01

## 2024-11-01 RX ORDER — CARVEDILOL 25 MG/1
25 TABLET ORAL 2 TIMES DAILY WITH MEALS
Qty: 180 TABLET | Refills: 3 | OUTPATIENT
Start: 2024-11-01

## 2024-11-01 RX ORDER — CARVEDILOL 25 MG/1
25 TABLET ORAL 2 TIMES DAILY WITH MEALS
Qty: 180 TABLET | Refills: 0 | Status: SHIPPED | OUTPATIENT
Start: 2024-11-01

## 2024-11-01 NOTE — TELEPHONE ENCOUNTER
Last saw SHELLY on 10/11/23. No FV scheduled.   90 day courtesy refill provided.   BMP ordered per protocol, notified patient via mychart.

## 2024-11-05 ENCOUNTER — OFFICE VISIT (OUTPATIENT)
Dept: MEDICAL GROUP | Facility: PHYSICIAN GROUP | Age: 49
End: 2024-11-05
Payer: COMMERCIAL

## 2024-11-05 VITALS
HEIGHT: 64 IN | WEIGHT: 309 LBS | OXYGEN SATURATION: 95 % | BODY MASS INDEX: 52.75 KG/M2 | TEMPERATURE: 97.6 F | SYSTOLIC BLOOD PRESSURE: 98 MMHG | DIASTOLIC BLOOD PRESSURE: 60 MMHG | HEART RATE: 90 BPM

## 2024-11-05 DIAGNOSIS — H60.502 ACUTE OTITIS EXTERNA OF LEFT EAR, UNSPECIFIED TYPE: ICD-10-CM

## 2024-11-05 DIAGNOSIS — H61.22 EXCESSIVE CERUMEN IN EAR CANAL, LEFT: ICD-10-CM

## 2024-11-05 PROCEDURE — 3078F DIAST BP <80 MM HG: CPT | Performed by: NURSE PRACTITIONER

## 2024-11-05 PROCEDURE — 69210 REMOVE IMPACTED EAR WAX UNI: CPT | Performed by: NURSE PRACTITIONER

## 2024-11-05 PROCEDURE — 99213 OFFICE O/P EST LOW 20 MIN: CPT | Mod: 25 | Performed by: NURSE PRACTITIONER

## 2024-11-05 PROCEDURE — 3074F SYST BP LT 130 MM HG: CPT | Performed by: NURSE PRACTITIONER

## 2024-11-05 RX ORDER — FUROSEMIDE 40 MG/1
1 TABLET ORAL 2 TIMES DAILY
COMMUNITY
End: 2024-11-26

## 2024-11-05 RX ORDER — OFLOXACIN 3 MG/ML
10 SOLUTION AURICULAR (OTIC) DAILY
Qty: 12 ML | Refills: 0 | Status: SHIPPED | OUTPATIENT
Start: 2024-11-05 | End: 2024-11-11

## 2024-11-05 ASSESSMENT — FIBROSIS 4 INDEX: FIB4 SCORE: .5555555555555555556

## 2024-11-05 NOTE — PROGRESS NOTES
Subjective:     CC: hearing loss, headache, vertigo, ear infection    HPI:   Shelli is an established patient of Dr. Madhu Keane who presents today with the following:      He was at [facility] on 10/27/2024 for left ear infection. He was prescribed ofloxacin 10 drops for 10 days, he is on day 9. Symptoms are slowly improving. He had been having left hearing loss, headache, vertigo and this is slowing improving. He has had subjective fever with sweating before antibiotics. Denies chills, sore throat, sinus pain, cough, sick contacts, loss of taste, loss of smell, itchy eyes, watery eyes, chest pain or shortness of breath today.      Past Medical History:   Diagnosis Date    Asthma     Congestive heart failure (HCC)     Diabetes (HCC)     Hypertension     Sleep apnea        Social History     Tobacco Use    Smoking status: Never    Smokeless tobacco: Never    Tobacco comments:     vapes   Vaping Use    Vaping status: Every Day    Substances: THC    Devices: Disposable   Substance Use Topics    Alcohol use: Not Currently    Drug use: Not Currently     Types: Inhaled, Marijuana     Comment: THC       Current Outpatient Medications Ordered in Epic   Medication Sig Dispense Refill    ofloxacin otic sol (FLOXIN OTIC) 0.3 % Solution Administer 10 Drops into the left ear every day for 6 days. Administer drops to both ears. 12 mL 0    carvedilol (COREG) 25 MG Tab Take 1 Tablet by mouth 2 times a day with meals. 180 Tablet 0    spironolactone (ALDACTONE) 50 MG Tab Take 1 Tablet by mouth every day. 90 Tablet 0    Semaglutide, 2 MG/DOSE, (OZEMPIC, 2 MG/DOSE,) 8 MG/3ML Solution Pen-injector Inject 2 mg under the skin every 7 days. 3 mL 3    metFORMIN (GLUCOPHAGE) 500 MG Tab Take 1 Tablet by mouth 2 times a day with meals. 180 Tablet 3    Empagliflozin (JARDIANCE) 10 MG Tab tablet Take 1 Tablet by mouth every day. 90 Tablet 3    amLODIPine (NORVASC) 10 MG Tab TAKE 1 TABLET BY MOUTH EVERY DAY 90 Tablet 0    torsemide  "(DEMADEX) 100 MG Tab Take 1 tab (100 mg) in AM by mouth and 1/2 tab (50 mg) in afternoon 135 Tablet 3    potassium chloride SA (KDUR) 20 MEQ Tab CR Take 1 Tablet by mouth 2 times a day. 180 Tablet 3    losartan (COZAAR) 100 MG Tab Take 1 Tablet by mouth every day. 90 Tablet 3    furosemide (LASIX) 40 MG Tab Take 1 Tablet by mouth 2 times a day. (Patient not taking: Reported on 11/5/2024)      fluticasone (FLONASE) 50 MCG/ACT nasal spray Administer 2 Sprays into affected nostril(S) every day. (Patient not taking: Reported on 11/5/2024) 16 g 0     No current Epic-ordered facility-administered medications on file.       Allergies:  Patient has no known allergies.    Health Maintenance: Reviewed has PCP follow-up 2/2025      Objective:     Vital signs reviewed  Exam:  BP 98/60 (BP Location: Left arm, Patient Position: Sitting, BP Cuff Size: Adult)   Pulse 90   Temp 36.4 °C (97.6 °F) (Temporal)   Ht 1.626 m (5' 4\")   Wt (!) 140 kg (309 lb)   SpO2 95%   BMI 53.04 kg/m²  Body mass index is 53.04 kg/m².    General: Normal appearing. No distress.  HENT: Normocephalic. Ears normal shape and contour, right ear canal is clear and right TM pearly gray, left ear canal with soft yellow cerumen present and erythema present, left TM partially visualized, nasal mucosa benign, oropharynx is without erythema, edema or exudates.  Left ear cerumen removed by me with lighted curette, patient tolerated well, left TM with bulging.  Eyes: Eyes conjunctiva clear lids without ptosis, pupils equal and reactive to light accommodation, lids normal.  Pulmonary: Clear to ausculation.  Normal effort. No rales, rhonchi, or wheezing.  Cardiovascular: Regular rate and rhythm without murmur.   Lymph: No cervical or supraclavicular lymph nodes are palpable.  Skin: Warm and dry.  No obvious lesions.  Psych: Normal mood and affect. Alert and oriented x3. Judgment and insight is normal.      Assessment & Plan:     48 y.o. male with the following - "     1. Acute otitis externa of left ear, unspecified type  Acute uncomplicated problem.  Would recommend additional 6-day course for total of 14 days.  He is not sure if he has enough antibiotic for today.  Continue ofloxacin 10 drops left ear.  - ofloxacin otic sol (FLOXIN OTIC) 0.3 % Solution; Administer 10 Drops into the left ear every day for 6 days. Administer drops to both ears.  Dispense: 12 mL; Refill: 0    2. Excessive cerumen in ear canal, left  Acute uncomplicated problem.  Left ear cerumen removed by me with lighted curette.  Patient tolerated well.        Return if symptoms worsen or fail to improve.    Please note that this dictation was created using voice recognition software. I have made every reasonable attempt to correct obvious errors, but I expect that there are errors of grammar and possibly content that I did not discover before finalizing the note.

## 2024-11-26 ENCOUNTER — OFFICE VISIT (OUTPATIENT)
Dept: MEDICAL GROUP | Facility: PHYSICIAN GROUP | Age: 49
End: 2024-11-26
Payer: COMMERCIAL

## 2024-11-26 VITALS
SYSTOLIC BLOOD PRESSURE: 108 MMHG | HEIGHT: 64 IN | DIASTOLIC BLOOD PRESSURE: 64 MMHG | HEART RATE: 89 BPM | OXYGEN SATURATION: 95 % | WEIGHT: 307 LBS | BODY MASS INDEX: 52.41 KG/M2 | TEMPERATURE: 98 F

## 2024-11-26 DIAGNOSIS — H66.002 NON-RECURRENT ACUTE SUPPURATIVE OTITIS MEDIA OF LEFT EAR WITHOUT SPONTANEOUS RUPTURE OF TYMPANIC MEMBRANE: ICD-10-CM

## 2024-11-26 PROCEDURE — 99213 OFFICE O/P EST LOW 20 MIN: CPT | Performed by: NURSE PRACTITIONER

## 2024-11-26 PROCEDURE — 3078F DIAST BP <80 MM HG: CPT | Performed by: NURSE PRACTITIONER

## 2024-11-26 PROCEDURE — 3074F SYST BP LT 130 MM HG: CPT | Performed by: NURSE PRACTITIONER

## 2024-11-26 RX ORDER — POTASSIUM CHLORIDE 1500 MG/1
TABLET, EXTENDED RELEASE ORAL
COMMUNITY
End: 2024-11-26

## 2024-11-26 RX ORDER — LISINOPRIL AND HYDROCHLOROTHIAZIDE 20; 25 MG/1; MG/1
1 TABLET ORAL DAILY
COMMUNITY
End: 2024-11-26

## 2024-11-26 ASSESSMENT — FIBROSIS 4 INDEX: FIB4 SCORE: .5555555555555555556

## 2024-11-27 NOTE — PROGRESS NOTES
Subjective:     CC: otalgia    HPI:   Shelli is an established patient of Dr. Keane who presents today with the following:    He was seen 3 weeks ago for acute otitis externa of left ear.  He was to complete a total of 14 days of ofloxacin.  Reports he completed antibiotics and his ear did feel better.  He is now having left ear fullness with drainage that started after completion of antibiotics. He has a headache with it.       Past Medical History:   Diagnosis Date    Asthma     Congestive heart failure (HCC)     Diabetes (HCC)     Hypertension     Sleep apnea        Social History     Tobacco Use    Smoking status: Never    Smokeless tobacco: Never    Tobacco comments:     vapes   Vaping Use    Vaping status: Every Day    Substances: THC    Devices: Disposable   Substance Use Topics    Alcohol use: Not Currently    Drug use: Not Currently     Types: Inhaled, Marijuana     Comment: THC       Current Outpatient Medications Ordered in Epic   Medication Sig Dispense Refill    amoxicillin-clavulanate (AUGMENTIN) 875-125 MG Tab Take 1 Tablet by mouth 2 times a day for 7 days. 14 Tablet 0    carvedilol (COREG) 25 MG Tab Take 1 Tablet by mouth 2 times a day with meals. 180 Tablet 0    spironolactone (ALDACTONE) 50 MG Tab Take 1 Tablet by mouth every day. 90 Tablet 0    Semaglutide, 2 MG/DOSE, (OZEMPIC, 2 MG/DOSE,) 8 MG/3ML Solution Pen-injector Inject 2 mg under the skin every 7 days. 3 mL 3    metFORMIN (GLUCOPHAGE) 500 MG Tab Take 1 Tablet by mouth 2 times a day with meals. 180 Tablet 3    Empagliflozin (JARDIANCE) 10 MG Tab tablet Take 1 Tablet by mouth every day. 90 Tablet 3    amLODIPine (NORVASC) 10 MG Tab TAKE 1 TABLET BY MOUTH EVERY DAY 90 Tablet 0    torsemide (DEMADEX) 100 MG Tab Take 1 tab (100 mg) in AM by mouth and 1/2 tab (50 mg) in afternoon 135 Tablet 3    potassium chloride SA (KDUR) 20 MEQ Tab CR Take 1 Tablet by mouth 2 times a day. 180 Tablet 3    losartan (COZAAR) 100 MG Tab Take 1 Tablet by mouth  "every day. 90 Tablet 3    fluticasone (FLONASE) 50 MCG/ACT nasal spray Administer 2 Sprays into affected nostril(S) every day. (Patient not taking: Reported on 11/26/2024) 16 g 0     No current Epic-ordered facility-administered medications on file.       Allergies:  Patient has no known allergies.    Health Maintenance: Reviewed      Objective:     Vital signs reviewed  Exam:  /64 (BP Location: Left arm, Patient Position: Sitting, BP Cuff Size: Adult)   Pulse 89   Temp 36.7 °C (98 °F) (Temporal)   Ht 1.626 m (5' 4\")   Wt (!) 139 kg (307 lb)   SpO2 95%   BMI 52.70 kg/m²  Body mass index is 52.7 kg/m².    General: Normal appearing. No distress.  HENT: Normocephalic. Ears normal shape and contour, canals are clear bilaterally, right TM pearly gray, left TM with erythema and bulging.   Eyes: Eyes conjunctiva clear lids without ptosis, lids normal.  Pulmonary: Clear to ausculation.  Normal effort. No rales, rhonchi, or wheezing.  Cardiovascular: Regular rate and rhythm without murmur.   Lymph: No cervical or supraclavicular lymph nodes are palpable.  Skin: Warm and dry.  No obvious lesions.  Psych: Normal mood and affect. Alert and oriented x3. Judgment and insight is normal.      Assessment & Plan:     48 y.o. male with the following -     1. Non-recurrent acute suppurative otitis media of left ear without spontaneous rupture of tympanic membrane  Acute uncomplicated problem.  Start Augmentin twice daily for 7-day course.  Stay hydrated.  Take medication with food and may take daily probiotic or eat daily yogurt.  Follow-up as needed.  - amoxicillin-clavulanate (AUGMENTIN) 875-125 MG Tab; Take 1 Tablet by mouth 2 times a day for 7 days.  Dispense: 14 Tablet; Refill: 0      Return if symptoms worsen or fail to improve.    Please note that this dictation was created using voice recognition software. I have made every reasonable attempt to correct obvious errors, but I expect that there are errors of grammar " and possibly content that I did not discover before finalizing the note.

## 2024-12-02 DIAGNOSIS — I51.89 RIGHT VENTRICULAR DYSFUNCTION, NYHA CLASS 3: ICD-10-CM

## 2024-12-02 DIAGNOSIS — I27.20 PULMONARY HYPERTENSION (HCC): ICD-10-CM

## 2024-12-02 DIAGNOSIS — I10 HTN (HYPERTENSION), MALIGNANT: ICD-10-CM

## 2024-12-02 DIAGNOSIS — R06.09 DYSPNEA ON EXERTION: ICD-10-CM

## 2024-12-02 DIAGNOSIS — I50.810 RIGHT HEART FAILURE WITH PRESERVED RIGHT VENTRICULAR FUNCTION (HCC): ICD-10-CM

## 2024-12-02 RX ORDER — LOSARTAN POTASSIUM 100 MG/1
100 TABLET ORAL DAILY
Qty: 30 TABLET | Refills: 0 | Status: SHIPPED | OUTPATIENT
Start: 2024-12-02 | End: 2024-12-26 | Stop reason: SDUPTHER

## 2024-12-02 RX ORDER — EMPAGLIFLOZIN 10 MG/1
10 TABLET, FILM COATED ORAL DAILY
Qty: 90 TABLET | Refills: 3 | Status: SHIPPED | OUTPATIENT
Start: 2024-12-02

## 2024-12-02 RX ORDER — AMLODIPINE BESYLATE 10 MG/1
10 TABLET ORAL DAILY
Qty: 30 TABLET | Refills: 0 | Status: SHIPPED | OUTPATIENT
Start: 2024-12-02 | End: 2024-12-26 | Stop reason: SDUPTHER

## 2024-12-02 RX ORDER — POTASSIUM CHLORIDE 1500 MG/1
20 TABLET, EXTENDED RELEASE ORAL 2 TIMES DAILY
Qty: 60 TABLET | Refills: 0 | Status: SHIPPED | OUTPATIENT
Start: 2024-12-02 | End: 2024-12-26 | Stop reason: SDUPTHER

## 2024-12-02 NOTE — TELEPHONE ENCOUNTER
Received request via: Pharmacy    Was the patient seen in the last year in this department? Yes    Does the patient have an active prescription (recently filled or refills available) for medication(s) requested? No    Ph[facility] Name: [facility]    Does the patient have [facility] Plus and need 100-day supply? (This applies to ALL medications) Patient does not have SCP

## 2024-12-02 NOTE — TELEPHONE ENCOUNTER
Is the patient due for a refill? Yes    Was the patient seen the past year? No    Date of last office visit: 10/11/23    Does the patient have an upcoming appointment?  Yes   If yes, When? 12/26/24    Provider to refill:SHELLY    Does the patient have [facility] Plus and need 100-day supply? (This applies to ALL medications) Patient does not have SCP

## 2024-12-14 ENCOUNTER — HOSPITAL ENCOUNTER (OUTPATIENT)
Dept: LAB | Facility: MEDICAL CENTER | Age: 49
End: 2024-12-14
Attending: NURSE PRACTITIONER
Payer: COMMERCIAL

## 2024-12-14 DIAGNOSIS — I10 HTN (HYPERTENSION), MALIGNANT: ICD-10-CM

## 2024-12-14 LAB
ANION GAP SERPL CALC-SCNC: 11 MMOL/L (ref 7–16)
BUN SERPL-MCNC: 25 MG/DL (ref 8–22)
CALCIUM SERPL-MCNC: 10.1 MG/DL (ref 8.5–10.5)
CHLORIDE SERPL-SCNC: 97 MMOL/L (ref 96–112)
CO2 SERPL-SCNC: 27 MMOL/L (ref 20–33)
CREAT SERPL-MCNC: 1.54 MG/DL (ref 0.5–1.4)
GFR SERPLBLD CREATININE-BSD FMLA CKD-EPI: 55 ML/MIN/1.73 M 2
GLUCOSE SERPL-MCNC: 62 MG/DL (ref 65–99)
POTASSIUM SERPL-SCNC: 4.2 MMOL/L (ref 3.6–5.5)
SODIUM SERPL-SCNC: 135 MMOL/L (ref 135–145)

## 2024-12-14 PROCEDURE — 80048 BASIC METABOLIC PNL TOTAL CA: CPT

## 2024-12-14 PROCEDURE — 36415 COLL VENOUS BLD VENIPUNCTURE: CPT

## 2024-12-26 ENCOUNTER — OFFICE VISIT (OUTPATIENT)
Dept: CARDIOLOGY | Facility: MEDICAL CENTER | Age: 49
End: 2024-12-26
Attending: NURSE PRACTITIONER
Payer: COMMERCIAL

## 2024-12-26 VITALS
HEIGHT: 64 IN | WEIGHT: 313.6 LBS | RESPIRATION RATE: 18 BRPM | BODY MASS INDEX: 53.54 KG/M2 | HEART RATE: 82 BPM | OXYGEN SATURATION: 97 % | SYSTOLIC BLOOD PRESSURE: 102 MMHG | DIASTOLIC BLOOD PRESSURE: 60 MMHG

## 2024-12-26 DIAGNOSIS — I10 HTN (HYPERTENSION), MALIGNANT: ICD-10-CM

## 2024-12-26 DIAGNOSIS — Z79.899 HIGH RISK MEDICATION USE: ICD-10-CM

## 2024-12-26 DIAGNOSIS — G47.33 OBSTRUCTIVE SLEEP APNEA: ICD-10-CM

## 2024-12-26 DIAGNOSIS — I50.810 RIGHT HEART FAILURE WITH PRESERVED RIGHT VENTRICULAR FUNCTION (HCC): ICD-10-CM

## 2024-12-26 DIAGNOSIS — E66.01 MORBID OBESITY WITH BMI OF 50.0-59.9, ADULT (HCC): ICD-10-CM

## 2024-12-26 PROCEDURE — 3074F SYST BP LT 130 MM HG: CPT | Performed by: NURSE PRACTITIONER

## 2024-12-26 PROCEDURE — 99214 OFFICE O/P EST MOD 30 MIN: CPT | Performed by: NURSE PRACTITIONER

## 2024-12-26 PROCEDURE — 99212 OFFICE O/P EST SF 10 MIN: CPT | Performed by: NURSE PRACTITIONER

## 2024-12-26 PROCEDURE — 3078F DIAST BP <80 MM HG: CPT | Performed by: NURSE PRACTITIONER

## 2024-12-26 RX ORDER — TORSEMIDE 100 MG/1
TABLET ORAL
Qty: 135 TABLET | Refills: 3 | Status: SHIPPED | OUTPATIENT
Start: 2024-12-26

## 2024-12-26 RX ORDER — LOSARTAN POTASSIUM 100 MG/1
100 TABLET ORAL DAILY
Qty: 90 TABLET | Refills: 3 | Status: SHIPPED | OUTPATIENT
Start: 2024-12-26

## 2024-12-26 RX ORDER — AMLODIPINE BESYLATE 10 MG/1
10 TABLET ORAL DAILY
Qty: 90 TABLET | Refills: 3 | Status: SHIPPED | OUTPATIENT
Start: 2024-12-26

## 2024-12-26 RX ORDER — CARVEDILOL 25 MG/1
25 TABLET ORAL 2 TIMES DAILY WITH MEALS
Qty: 180 TABLET | Refills: 3 | Status: SHIPPED | OUTPATIENT
Start: 2024-12-26

## 2024-12-26 RX ORDER — POTASSIUM CHLORIDE 1500 MG/1
20 TABLET, EXTENDED RELEASE ORAL 2 TIMES DAILY
Qty: 180 TABLET | Refills: 3 | Status: SHIPPED | OUTPATIENT
Start: 2024-12-26

## 2024-12-26 RX ORDER — SPIRONOLACTONE 50 MG/1
50 TABLET, FILM COATED ORAL DAILY
Qty: 90 TABLET | Refills: 3 | Status: SHIPPED | OUTPATIENT
Start: 2024-12-26

## 2024-12-26 ASSESSMENT — ENCOUNTER SYMPTOMS
COUGH: 0
ORTHOPNEA: 0
SHORTNESS OF BREATH: 0
PALPITATIONS: 0
INSOMNIA: 0
ABDOMINAL PAIN: 0
DIZZINESS: 0
CLAUDICATION: 0
NERVOUS/ANXIOUS: 0
MYALGIAS: 0
FEVER: 0
PND: 0

## 2024-12-26 ASSESSMENT — FIBROSIS 4 INDEX: FIB4 SCORE: 0.57

## 2024-12-26 NOTE — PROGRESS NOTES
Chief Complaint   Patient presents with    Hypertension     F/V Dx: HTN (hypertension), malignant    Congestive Heart Failure     F/V Dx: Right heart failure with preserved right ventricular function (HCC)       Subjective:   Shelli Moreira is a 49 y.o. male who presents today for follow-up on his heart failure, HTN.    Current patient of the Heart failure clinic.  He was last seen in clinic on 10/11/2023 with myself.  No changes were made during his last visit, he was sent for follow-up lab testing and recommended to follow back up again in 3 months.      He mentions he lost track of time.    Patient feels well, denies chest pain, shortness of breath, palpitations, dizziness/lightheadedness, orthopnea, PND or Edema.      Since his last visit, he has lost between 30 to 40 pounds.  He was started on Ozempic by his PCP.    Patient did not get back in with his sleep medicine provider.    Patient also reports he has been using his BiPAP regularly.    Additionally, patient has the following medical problems:    -hospitalization from 6/18/2021 through 6/23/2021.  Patient had presented with shortness of breath and weight gain.  He was a transfer from [facility].  Patient was found to have acute hypoxemic respiratory failure.  An echo was performed and patient was found to have right-sided heart failure.  Patient has a history of sleep apnea and obesity.  Likely his CPAP is not as effective.  Patient diuresed and discharged home.  Patient recommended to follow-up for repeat sleep study    -Hypertension    -Sleep apnea    Past Medical History:   Diagnosis Date    Asthma     Congestive heart failure (HCC)     Diabetes (HCC)     Hypertension     Sleep apnea      Past Surgical History:   Procedure Laterality Date    KNEE ARTHROSCOPY Left 2009     Family History   Problem Relation Age of Onset    Diabetes Mother     No Known Problems Sister      Social History     Socioeconomic History    Marital status:       Spouse name: Not on file    Number of children: Not on file    Years of education: Not on file    Highest education level: Not on file   Occupational History    Not on file   Tobacco Use    Smoking status: Never    Smokeless tobacco: Never    Tobacco comments:     vapes   Vaping Use    Vaping status: Former    Substances: THC    Devices: Disposable   Substance and Sexual Activity    Alcohol use: Yes    Drug use: Not Currently     Types: Inhaled, Marijuana     Comment: THC    Sexual activity: Not on file   Other Topics Concern    Not on file   Social History Narrative    Not on file     Social Drivers of Health     Financial Resource Strain: Not on file   Food Insecurity: Not on file   Transportation Needs: Not on file   Physical Activity: Not on file   Stress: Not on file   Social Connections: Not on file   Intimate Partner Violence: Not on file   Housing Stability: Not on file     No Known Allergies  Outpatient Encounter Medications as of 12/26/2024   Medication Sig Dispense Refill    amLODIPine (NORVASC) 10 MG Tab Take 1 Tablet by mouth every day. 90 Tablet 3    carvedilol (COREG) 25 MG Tab Take 1 Tablet by mouth 2 times a day with meals. 180 Tablet 3    losartan (COZAAR) 100 MG Tab Take 1 Tablet by mouth every day. 90 Tablet 3    potassium chloride SA (KDUR) 20 MEQ Tab CR Take 1 Tablet by mouth 2 times a day. 180 Tablet 3    torsemide (DEMADEX) 100 MG Tab Take 1 tab (100 mg) in AM by mouth and 1/2 tab (50 mg) in afternoon 135 Tablet 3    spironolactone (ALDACTONE) 50 MG Tab Take 1 Tablet by mouth every day. 90 Tablet 3    JARDIANCE 10 MG Tab tablet TAKE 1 TABLET BY MOUTH EVERY DAY 90 Tablet 3    Semaglutide, 2 MG/DOSE, (OZEMPIC, 2 MG/DOSE,) 8 MG/3ML Solution Pen-injector Inject 2 mg under the skin every 7 days. 3 mL 3    metFORMIN (GLUCOPHAGE) 500 MG Tab Take 1 Tablet by mouth 2 times a day with meals. 180 Tablet 3    [DISCONTINUED] amLODIPine (NORVASC) 10 MG Tab TAKE 1 TABLET BY MOUTH EVERY DAY 30 Tablet 0  "   [DISCONTINUED] potassium chloride SA (KDUR) 20 MEQ Tab CR TAKE 1 TABLET BY MOUTH TWICE DAILY 60 Tablet 0    [DISCONTINUED] losartan (COZAAR) 100 MG Tab TAKE 1 TABLET BY MOUTH EVERY DAY 30 Tablet 0    [DISCONTINUED] carvedilol (COREG) 25 MG Tab Take 1 Tablet by mouth 2 times a day with meals. 180 Tablet 0    [DISCONTINUED] spironolactone (ALDACTONE) 50 MG Tab Take 1 Tablet by mouth every day. 90 Tablet 0    [DISCONTINUED] torsemide (DEMADEX) 100 MG Tab Take 1 tab (100 mg) in AM by mouth and 1/2 tab (50 mg) in afternoon 135 Tablet 3    [DISCONTINUED] fluticasone (FLONASE) 50 MCG/ACT nasal spray Administer 2 Sprays into affected nostril(S) every day. (Patient not taking: Reported on 11/5/2024) 16 g 0     No facility-administered encounter medications on file as of 12/26/2024.     Review of Systems   Constitutional:  Negative for fever and malaise/fatigue.   HENT:  Negative for congestion.    Respiratory:  Negative for cough and shortness of breath.    Cardiovascular:  Negative for chest pain, palpitations, orthopnea, claudication, leg swelling and PND.   Gastrointestinal:  Negative for abdominal pain.   Musculoskeletal:  Negative for myalgias.   Neurological:  Negative for dizziness.   Psychiatric/Behavioral:  The patient is not nervous/anxious and does not have insomnia.    All other systems reviewed and are negative.       Objective:   /60 (BP Location: Left arm, Patient Position: Sitting, BP Cuff Size: Adult)   Pulse 82   Resp 18   Ht 1.626 m (5' 4.02\")   Wt (!) 142 kg (313 lb 9.6 oz)   SpO2 97%   BMI 53.80 kg/m²     Physical Exam  Vitals reviewed.   Constitutional:       Appearance: He is well-developed. He is morbidly obese.   HENT:      Head: Normocephalic and atraumatic.   Eyes:      General: Lids are normal.      Pupils: Pupils are equal, round, and reactive to light.   Neck:      Vascular: No JVD.   Cardiovascular:      Rate and Rhythm: Normal rate and regular rhythm.      Heart sounds: Normal " heart sounds.   Pulmonary:      Effort: Pulmonary effort is normal. No respiratory distress.      Breath sounds: Normal breath sounds. No wheezing or rales.   Abdominal:      General: Bowel sounds are normal.      Palpations: Abdomen is soft.   Musculoskeletal:         General: Normal range of motion.      Cervical back: Normal range of motion and neck supple.      Right lower leg: No edema.      Left lower leg: No edema.   Skin:     General: Skin is warm and dry.   Neurological:      General: No focal deficit present.      Mental Status: He is alert and oriented to person, place, and time.   Psychiatric:         Speech: Speech normal.         Behavior: Behavior normal.         Thought Content: Thought content normal.         Judgment: Judgment normal.       Lab Results   Component Value Date/Time    CHOLSTRLTOT 135 10/28/2023 10:35 AM    LDL 70 10/28/2023 10:35 AM    HDL 37 (A) 10/28/2023 10:35 AM    TRIGLYCERIDE 140 10/28/2023 10:35 AM       Lab Results   Component Value Date/Time    SODIUM 135 12/14/2024 09:17 AM    POTASSIUM 4.2 12/14/2024 09:17 AM    CHLORIDE 97 12/14/2024 09:17 AM    CO2 27 12/14/2024 09:17 AM    GLUCOSE 62 (L) 12/14/2024 09:17 AM    BUN 25 (H) 12/14/2024 09:17 AM    CREATININE 1.54 (H) 12/14/2024 09:17 AM     Lab Results   Component Value Date/Time    ALKPHOSPHAT 104 (H) 10/28/2023 10:35 AM    ASTSGOT 15 10/28/2023 10:35 AM    ALTSGPT 16 10/28/2023 10:35 AM    TBILIRUBIN 0.4 10/28/2023 10:35 AM      Transthoracic Echo Report 6/18/2021  No prior study is available for comparison.   Normal left ventricular systolic function.   No evidence of valvular abnormality based on Doppler evaluation.   Severely dilated right ventricle. Normal right ventricular systolic function.  Unable to estimate pulmonary artery pressure due to an inadequate tricuspid regurgitant jet    Assessment:     1. Right heart failure with preserved right ventricular function (HCC)  amLODIPine (NORVASC) 10 MG Tab    potassium  chloride SA (KDUR) 20 MEQ Tab CR    torsemide (DEMADEX) 100 MG Tab    spironolactone (ALDACTONE) 50 MG Tab      2. HTN (hypertension), malignant  carvedilol (COREG) 25 MG Tab    losartan (COZAAR) 100 MG Tab    spironolactone (ALDACTONE) 50 MG Tab      3. High risk medication use        4. Obstructive sleep apnea        5. Morbid obesity with BMI of 50.0-59.9, adult (HCC)                Medical Decision Making:  Today's Assessment / Status / Plan:   1.  Right-sided heart failure versus, obesity hypoventilation syndrome:  -Last echo from [facility] on 8/29/2023, EF 65%  -Based on physical examination findings, patient is euvolemic. No JVD, lungs are clear to auscultation, no pitting edema in bilateral lower extremities, no ascites.   -Continue torsemide 100 mg in the a.m. and 50 mg in the afternoon  -Continue spironolactone 50 mg daily  -Continue potassium 20 mEq twice a day   -Continue Jardiance 10 mg daily   - recent lab testing showed slight decrease of kidney function, but will follow.  Patient is planning on reestablishing with PCP in February and will have additional lab testing done.  -Reinforced s/sx of worsening symptoms with patient and weight monitoring. Pt verbalizes understanding. Pt to call office or RTC if present.   -Continue low-sodium diet    2.  Sleep apnea:  -Continue BiPAP use  -Encouraged to follow up with Sleep medicine provider     3.  Obesity: BMI 53.80  -Congratulated patient on his current weight loss, encouraged continued weight loss    4.  Hypertension: Stable  -Continue amlodipine 10 mg daily  -Continue carvedilol 25 mg twice a day  -Continue losartan 100 mg Daily  -Continue spironolactone 50 mg daily    FU in clinic in 6 months. Sooner if needed.    Patient verbalizes understanding and agrees with the plan of care.     PLEASE NOTE: This Note was created using voice recognition Software. I have made every reasonable attempt to correct obvious errors, but I expect that  there are errors of grammar and possibly content that I did not discover before finalizing the note

## 2024-12-31 ENCOUNTER — TELEPHONE (OUTPATIENT)
Dept: MEDICAL GROUP | Facility: LAB | Age: 49
End: 2024-12-31
Payer: COMMERCIAL

## 2024-12-31 NOTE — TELEPHONE ENCOUNTER
MEDICATION PRIOR AUTHORIZATION NEEDED:    1. Name of Medication: Ozempic (0.25 or 0.5 MG/DOSE) 2MG/3ML pen-injectors    2. Requested By (Name of Pharmacy): [facility]     3. Is insurance on file current? yes    4. What is the name & phone number of the 3rd party payor? (Key: [id number])

## 2025-01-15 DIAGNOSIS — E11.9 TYPE 2 DIABETES MELLITUS WITHOUT COMPLICATION, WITHOUT LONG-TERM CURRENT USE OF INSULIN (HCC): ICD-10-CM

## 2025-01-16 RX ORDER — SEMAGLUTIDE 2.68 MG/ML
2 INJECTION, SOLUTION SUBCUTANEOUS
Qty: 3 ML | Refills: 3 | Status: SHIPPED | OUTPATIENT
Start: 2025-01-16

## 2025-01-31 DIAGNOSIS — E11.9 TYPE 2 DIABETES MELLITUS WITHOUT COMPLICATION, WITHOUT LONG-TERM CURRENT USE OF INSULIN (HCC): ICD-10-CM

## 2025-02-24 ENCOUNTER — RESULTS FOLLOW-UP (OUTPATIENT)
Dept: MEDICAL GROUP | Facility: LAB | Age: 50
End: 2025-02-24

## 2025-02-24 ENCOUNTER — APPOINTMENT (OUTPATIENT)
Dept: MEDICAL GROUP | Facility: LAB | Age: 50
End: 2025-02-24
Payer: COMMERCIAL

## 2025-02-24 VITALS
HEIGHT: 64 IN | TEMPERATURE: 97.2 F | HEART RATE: 90 BPM | DIASTOLIC BLOOD PRESSURE: 60 MMHG | WEIGHT: 302.47 LBS | OXYGEN SATURATION: 96 % | RESPIRATION RATE: 18 BRPM | SYSTOLIC BLOOD PRESSURE: 110 MMHG | BODY MASS INDEX: 51.64 KG/M2

## 2025-02-24 DIAGNOSIS — E11.9 TYPE 2 DIABETES MELLITUS WITHOUT COMPLICATION, WITHOUT LONG-TERM CURRENT USE OF INSULIN (HCC): ICD-10-CM

## 2025-02-24 DIAGNOSIS — Z00.00 ANNUAL PHYSICAL EXAM: ICD-10-CM

## 2025-02-24 DIAGNOSIS — Z12.11 COLON CANCER SCREENING: ICD-10-CM

## 2025-02-24 LAB
HBA1C MFR BLD: 5.2 % (ref ?–5.8)
POCT INT CON NEG: NEGATIVE
POCT INT CON POS: POSITIVE

## 2025-02-24 PROCEDURE — 3074F SYST BP LT 130 MM HG: CPT | Performed by: STUDENT IN AN ORGANIZED HEALTH CARE EDUCATION/TRAINING PROGRAM

## 2025-02-24 PROCEDURE — 3078F DIAST BP <80 MM HG: CPT | Performed by: STUDENT IN AN ORGANIZED HEALTH CARE EDUCATION/TRAINING PROGRAM

## 2025-02-24 PROCEDURE — 99396 PREV VISIT EST AGE 40-64: CPT | Performed by: STUDENT IN AN ORGANIZED HEALTH CARE EDUCATION/TRAINING PROGRAM

## 2025-02-24 PROCEDURE — 83036 HEMOGLOBIN GLYCOSYLATED A1C: CPT | Performed by: STUDENT IN AN ORGANIZED HEALTH CARE EDUCATION/TRAINING PROGRAM

## 2025-02-24 ASSESSMENT — ENCOUNTER SYMPTOMS
SHORTNESS OF BREATH: 0
CHILLS: 0
FEVER: 0

## 2025-02-24 ASSESSMENT — FIBROSIS 4 INDEX: FIB4 SCORE: 0.57

## 2025-02-24 ASSESSMENT — PATIENT HEALTH QUESTIONNAIRE - PHQ9: CLINICAL INTERPRETATION OF PHQ2 SCORE: 0

## 2025-02-24 NOTE — PROGRESS NOTES
Subjective:     Subjective:     CC:   Chief Complaint   Patient presents with    Annual Exam       HPI:   Shelli Moreira is a 49 y.o. male who presents for annual exam    Last Colorectal Cancer Screening: pt unable to recall   Last Tdap: 20222  Received HPV series: No  Hx STDs: No     Exercise: walking but no dedicated   Diet: eating more salads, cut down on his junk food. Drinks only water and tea    He  has a past medical history of Asthma, Congestive heart failure ([facility]), Diabetes ([facility]), Hypertension, and Sleep apnea.  He  has a past surgical history that includes knee arthroscopy (Left, 2009).    Family History   Problem Relation Age of Onset    Diabetes Mother     No Known Problems Sister      Social History     Tobacco Use    Smoking status: Never    Smokeless tobacco: Never    Tobacco comments:     vapes   Vaping Use    Vaping status: Former    Substances: THC    Devices: Disposable   Substance Use Topics    Alcohol use: Yes    Drug use: Not Currently     Types: Inhaled, Marijuana     Comment: THC     He  has no history on file for sexual activity.    Patient Active Problem List    Diagnosis Date Noted    Vitamin D deficiency 11/01/2023    Syncope 08/30/2023    Ganglion cyst 12/07/2022    Type 2 diabetes mellitus without complication, without long-term current use of insulin ([facility]) 06/21/2021    Right heart failure with preserved right ventricular function ([facility]) 06/19/2021    Obstructive sleep apnea 06/19/2021    Morbid obesity ([facility]) 06/19/2021    Hypertension 06/19/2021    Carpal tunnel syndrome 12/07/2005     Current Outpatient Medications   Medication Sig Dispense Refill    metFORMIN (GLUCOPHAGE) 500 MG Tab Take 1 Tablet by mouth 2 times a day with meals. 180 Tablet 3    Semaglutide, 2 MG/DOSE, (OZEMPIC, 2 MG/DOSE,) 8 MG/3ML Solution Pen-injector Inject 2 mg under the skin every 7 days. 3 mL 3    amLODIPine (NORVASC) 10 MG Tab Take 1 Tablet by mouth every day. 90 Tablet 3    carvedilol (COREG)  "25 MG Tab Take 1 Tablet by mouth 2 times a day with meals. 180 Tablet 3    losartan (COZAAR) 100 MG Tab Take 1 Tablet by mouth every day. 90 Tablet 3    potassium chloride SA (KDUR) 20 MEQ Tab CR Take 1 Tablet by mouth 2 times a day. 180 Tablet 3    torsemide (DEMADEX) 100 MG Tab Take 1 tab (100 mg) in AM by mouth and 1/2 tab (50 mg) in afternoon 135 Tablet 3    spironolactone (ALDACTONE) 50 MG Tab Take 1 Tablet by mouth every day. 90 Tablet 3    JARDIANCE 10 MG Tab tablet TAKE 1 TABLET BY MOUTH EVERY DAY 90 Tablet 3     No current facility-administered medications for this visit.     No Known Allergies    Review of Systems   Review of Systems   Constitutional:  Negative for chills and fever.   Respiratory:  Negative for shortness of breath.    Cardiovascular:  Negative for chest pain.        Objective:   /60 (BP Location: Right arm, Patient Position: Sitting, BP Cuff Size: Large adult)   Pulse 90   Temp 36.2 °C (97.2 °F) (Temporal)   Resp 18   Ht 1.626 m (5' 4.02\")   Wt (!) 137 kg (302 lb 7.5 oz)   SpO2 96%   BMI 51.89 kg/m²      Wt Readings from Last 4 Encounters:   02/24/25 (!) 137 kg (302 lb 7.5 oz)   12/26/24 (!) 142 kg (313 lb 9.6 oz)   11/26/24 (!) 139 kg (307 lb)   11/05/24 (!) 140 kg (309 lb)         Physical Exam:  Physical Exam  Constitutional:       General: He is not in acute distress.     Appearance: Normal appearance. He is not ill-appearing.   HENT:      Head: Normocephalic and atraumatic.      Right Ear: Tympanic membrane and ear canal normal.      Left Ear: Tympanic membrane and ear canal normal.      Mouth/Throat:      Mouth: Mucous membranes are moist.      Pharynx: Oropharynx is clear.   Eyes:      Extraocular Movements: Extraocular movements intact.      Pupils: Pupils are equal, round, and reactive to light.   Neck:      Thyroid: No thyromegaly.   Cardiovascular:      Rate and Rhythm: Normal rate and regular rhythm.      Heart sounds: Normal heart sounds.   Pulmonary:      Effort: " Pulmonary effort is normal.      Breath sounds: Normal breath sounds.   Abdominal:      General: Abdomen is flat. Bowel sounds are normal.      Palpations: Abdomen is soft.   Musculoskeletal:      Cervical back: Normal range of motion and neck supple.      Right lower leg: No edema.      Left lower leg: No edema.   Lymphadenopathy:      Cervical: No cervical adenopathy.   Skin:     General: Skin is warm and dry.   Neurological:      General: No focal deficit present.      Mental Status: He is alert.   Psychiatric:         Mood and Affect: Mood normal.         Behavior: Behavior normal.         Thought Content: Thought content normal.         Judgment: Judgment normal.         Monofilament testing wnl     Assessment and Plan:     Problem List Items Addressed This Visit       Type 2 diabetes mellitus without complication, without long-term current use of insulin (HCC)    Relevant Orders    POCT  A1C    MICROALBUMIN CREAT RATIO URINE    Diabetic Monofilament LE Exam (Completed)    Referral for Retinal Screening Exam     Other Visit Diagnoses         Colon cancer screening        Relevant Orders    Referral to GI for Colonoscopy      Annual physical exam        Relevant Orders    HEMOGLOBIN A1C    CBC WITH DIFFERENTIAL    Comp Metabolic Panel    TSH WITH REFLEX TO FT4    Lipid Profile             Health maintenance:     Labs per orders  Immunizations per orders  Age-appropriate guidance discussed including diet and exercise  Discussed importance of exercise so patient continues to lose  weight     Follow-up: 6 month lab review

## 2025-02-25 NOTE — Clinical Note
REFERRAL APPROVAL NOTICE         Sent on February 25, 2025                   Shelli Moreira  [address] [address]                   Dear Mr. Moreira,    After a careful review of the medical information and benefit coverage, [facility] has processed your referral. See below for additional details.    If applicable, you must be actively enrolled with your insurance for coverage of the authorized service. If you have any questions regarding your coverage, please contact your insurance directly.    REFERRAL INFORMATION   Referral #:  [id number]  Referred-To Provider    Referred-By Provider:  Gastroenterology    Madhu Keane D.O.   [facility]      [address]  [place] [id number] [phone] [address] [address]  [phone]    Referral Start Date:  02/24/2025  Referral End Date:   02/24/2026             SCHEDULING  If you do not already have an appointment, please call [phone] to make an appointment.     MORE INFORMATION  If you do not already have a [employer] account, sign up at: [email or web address]  You can access your medical information, make appointments, see lab results, billing information, and more.  If you have questions regarding this referral, please contact  the [facility] Referrals department at:             [phone]. Monday - Friday 8:00AM - 5:00PM.     Sincerely,    [facility]

## 2025-02-25 NOTE — Clinical Note
REFERRAL APPROVAL NOTICE         Sent on February 25, 2025                   Shelli Moreira  [address] [place] [address]                   Dear Mr. Moreira,    After a careful review of the medical information and benefit coverage, [facility] has processed your referral. See below for additional details.    If applicable, you must be actively enrolled with your insurance for coverage of the authorized service. If you have any questions regarding your coverage, please contact your insurance directly.    REFERRAL INFORMATION   Referral #:  [id number]  Referred-To Provider    Referred-By Provider:  Ophthalmology    Madhu Keane D.O.   [facility] [place]      [address]  [place] [address]  [phone] [address] [place] [address]  [phone]    Referral Start Date:  02/24/2025  Referral End Date:   02/24/2026             SCHEDULING  If you do not already have an appointment, please call [phone] to make an appointment.     MORE INFORMATION  If you do not already have a [employer] account, sign up at: [email or web address]  You can access your medical information, make appointments, see lab results, billing information, and more.  If you have questions regarding this referral, please contact  the [facility] Referrals department at:             [phone]. Monday - Friday 8:00AM - 5:00PM.     Sincerely,    [facility]

## 2025-05-14 DIAGNOSIS — E11.9 TYPE 2 DIABETES MELLITUS WITHOUT COMPLICATION, WITHOUT LONG-TERM CURRENT USE OF INSULIN (HCC): ICD-10-CM

## 2025-05-15 RX ORDER — SEMAGLUTIDE 2.68 MG/ML
INJECTION, SOLUTION SUBCUTANEOUS
Qty: 3 ML | Refills: 6 | Status: SHIPPED | OUTPATIENT
Start: 2025-05-15

## 2025-06-18 ENCOUNTER — TELEPHONE (OUTPATIENT)
Dept: CARDIOLOGY | Facility: MEDICAL CENTER | Age: 50
End: 2025-06-18
Payer: COMMERCIAL

## 2025-06-18 NOTE — LETTER
June 20, 2025      PROCEDURE/SURGERY CLEARANCE FORM    Date: 6/20/2025   Patient Name: Shelli Moreira    Dear Surgeon or Proceduralist,     The above patient is cleared to have the following procedure/surgery:   Colonoscopy with Deep (Propofol) Sedation        Thank you for your request for cardiac stratification of our mutual patient Shelli Moreira 1975. We have reviewed their [facility] records; and to the best of our understanding this patient has not had stenting, ablation, watchman, cardiothoracic surgery or hospitalization for cardiovascular reasons in the past 6 months.  Shelli Moreira has been seen within the past 15 months and is considered to have non-modifiable cardiac risk for this low-risk procedure/surgery. They may proceed from a cardiovascular standpoint and may hold their antiplatelet/anticoagulation as briefly as possible. Please have patient resume this medication when hemodynamically stable to do so.     Aspirin or Prasugrel   - hold 7 days prior to procedure/surgery, resume when hemodynamically stable      Clopidrogrel or Ticagrelor  - hold 7 days for all neurological procedures, hold 5 days prior to all other procedure/surgery,  resume when hemodynamically stable     Warfarin - hold 7 days for all neurological procedures, hold 5 days prior to all other procedure/surgery and coordinate with [facility]tico[facility] ([phone]) INR testing and dose management.      Pradaxa/Xarelto/Eliquis/Savesya - hold 1 day prior to procedure for low bleeding risk procedure, 2 days for high bleeding risk procedure, or consider holding 3 days or longer for patients with reduced kidney function (CrCl <30mL/min) or spinal/cranial surgeries/procedures.      If they have a mechanical heart valve, please coordinate with [facility] Anticoagulation Service ([phone]) the proper management of their anticoagulant in the periprocedural or perioperative period.      Some  patients have higher risk for cardiovascular complications or holding medication. If our patient has had prior complications of holding antiplatelet or anticoagulants in the past and we have seen them after these events, we have addressed these concerns with the patient. They are at an unknown degree of increased risk for recurrent complication.  You may hold anticoagulation/antiplatelets for the procedure or surgery if the benefits of the procedure or surgery outweigh this nonmodifiable risk.      If Shelli Moreira 1975 has new symptoms of heart failure decompensation, unstable arrythmia, or angina please reach out and we will assess the patient.      If you have other patient-specific concerns, please feel free to reach out to the patient's cardiologist directly at [phone].     Thank you,   [facility]    __ _Electronically signed________  Provider: Eliza JARVIS

## 2025-06-20 NOTE — TELEPHONE ENCOUNTER
Last OV: 12/26/24  Proposed Surgery: Colonoscopy with Deep (Propofol) Sedation   Surgery Date: 7/14/25  Requesting Office Name: [facility]   Fax Number: [phone]  Preference of Location (default is surgery center unless specified by Cardiologist or RAFAELA)  Prior Clearance Addressed: No    Is this a general clearance? YES   Anticoags/Antiplatelets: Other none  Anticoags/Antiplatelet managed by Cardiology? YES    Outstanding Cardiac Imaging : No  Ablation, Cardioversion, Stent, Cardiac Devices, Catheterization, Watchman: No  TAVR/Valve, Mitral Clip, Watchman (including open heart),: N/A   Recent Cardiac Hospitalization: No            When: Greater than 3 months since hospitalization   History (cardiac history): Past Medical History[1]        Is this a dental clearance? NO  Ablation, Cardioversion, Watchman, Stents, Cath, Devices within the last 3 months? No   If yes- Send dental wait letter, do not forward to provider for review.     TAVR / Valve, Mitral clip within the last 6 months? No  If yes- Send dental wait letter, do not forward to provider for review.     If completing a general clearance, continue per protocol.           Surgical Clearance Letter Sent: YES   **Scan clearance request letter into [employer].**          [1]   Past Medical History:  Diagnosis Date    Asthma     Congestive heart failure (HCC)     Diabetes (HCC)     Hypertension     Sleep apnea

## 2025-06-27 ENCOUNTER — PATIENT MESSAGE (OUTPATIENT)
Dept: CARDIOLOGY | Facility: MEDICAL CENTER | Age: 50
End: 2025-06-27
Payer: COMMERCIAL

## 2025-06-30 ENCOUNTER — OFFICE VISIT (OUTPATIENT)
Dept: CARDIOLOGY | Facility: MEDICAL CENTER | Age: 50
End: 2025-06-30
Attending: NURSE PRACTITIONER
Payer: COMMERCIAL

## 2025-06-30 VITALS
SYSTOLIC BLOOD PRESSURE: 103 MMHG | DIASTOLIC BLOOD PRESSURE: 71 MMHG | WEIGHT: 299 LBS | HEART RATE: 87 BPM | HEIGHT: 64 IN | OXYGEN SATURATION: 94 % | RESPIRATION RATE: 20 BRPM | BODY MASS INDEX: 51.04 KG/M2

## 2025-06-30 DIAGNOSIS — I10 HTN (HYPERTENSION), MALIGNANT: ICD-10-CM

## 2025-06-30 DIAGNOSIS — R06.02 SOB (SHORTNESS OF BREATH): ICD-10-CM

## 2025-06-30 DIAGNOSIS — E66.01 MORBID OBESITY WITH BMI OF 50.0-59.9, ADULT (HCC): ICD-10-CM

## 2025-06-30 DIAGNOSIS — I50.810 RIGHT HEART FAILURE WITH PRESERVED RIGHT VENTRICULAR FUNCTION (HCC): Primary | ICD-10-CM

## 2025-06-30 DIAGNOSIS — G47.33 OBSTRUCTIVE SLEEP APNEA: ICD-10-CM

## 2025-06-30 PROCEDURE — 99213 OFFICE O/P EST LOW 20 MIN: CPT | Performed by: NURSE PRACTITIONER

## 2025-06-30 ASSESSMENT — FIBROSIS 4 INDEX: FIB4 SCORE: 0.57

## 2025-06-30 ASSESSMENT — MINNESOTA LIVING WITH HEART FAILURE QUESTIONNAIRE (MLHF)
EATING LESS FOODS YOU LIKE: 2
HAVING TO SIT OR LIE DOWN DURING THE DAY: 0
DIFFICULTY SLEEPING WELL AT NIGHT: 0
MAKING YOU WORRY: 0
DIFFICULTY WITH RECREATIONAL PASTIMES, SPORTS, HOBBIES: 0
MAKING YOU FEEL DEPRESSED: 0
MAKING YOU STAY IN A HOSPITAL: 0
LOSS OF SELF CONTROL IN YOUR LIFE: 0
TIRED, FATIGUED OR LOW ON ENERGY: 1
DIFFICULTY WORKING TO EARN A LIVING: 0
FEELING LIKE A BURDEN TO FAMILY AND FRIENDS: 0
DIFFICULTY GOING AWAY FROM HOME: 0
GIVING YOU SIDE EFFECTS FROM TREATMENTS: 0
DIFFICULTY TO CONCENTRATE OR REMEMBERING THINGS: 0
DIFFICULTY SOCIALIZING WITH FAMILY OR FRIENDS: 0
TOTAL_SCORE: 4
WORKING AROUND THE HOUSE OR YARD DIFFICULT: 0
COSTING YOU MONEY FOR MEDICAL CARE: 0
SWELLING IN ANKLES OR LEGS: 0
WALKING ABOUT OR CLIMBING STAIRS DIFFICULT: 0
MAKING YOU SHORT OF BREATH: 1
DIFFICULTY WITH SEXUAL ACTIVITIES: 0

## 2025-06-30 ASSESSMENT — ENCOUNTER SYMPTOMS
MYALGIAS: 0
ABDOMINAL PAIN: 0
ORTHOPNEA: 0
COUGH: 0
PND: 0
CLAUDICATION: 0
SHORTNESS OF BREATH: 0
PALPITATIONS: 0
INSOMNIA: 0
FEVER: 0
DIZZINESS: 0
NERVOUS/ANXIOUS: 0

## 2025-06-30 ASSESSMENT — 6 MINUTE WALK TEST (6MWT): TOTAL DISTANCE WALKED (METERS): 301

## 2025-06-30 NOTE — PROGRESS NOTES
Chief Complaint   Patient presents with    Hypertension    Syncope    Other     F/V Dx: Right heart failure with preserved right ventricular function (HCC)       Subjective:   Shelli Moreira is a 49 y.o. male who presents today for follow-up on his heart failure, HTN.    Current patient of the Heart failure clinic.  He was last seen in clinic on 12/26/2024 with myself.  No changes were made during his last visit, but pt was recommended to get in with sleep medicine.      He mentions he tried to call his sleep medicine provider a couplee of times, but did not get far with an appt.    Patient feels well, denies chest pain, shortness of breath, palpitations, dizziness/lightheadedness, orthopnea, PND or Edema.      Since his last visit, he continues to lose weight. He has lost about 15-20 lbs more. He is down 50-60 lbs total. His home weight was 295 lbs at home today. He does mentions fluctuates with diet inconsistencies.      He continues to take Ozempic through his PCP.    He is exercising a little bit. He does stretches daily and is walking a little bit more.    Patient also reports he has been using his BiPAP regularly.    Additionally, patient has the following medical problems:    -hospitalization from 6/18/2021 through 6/23/2021.  Patient had presented with shortness of breath and weight gain.  He was a transfer from [facility].  Patient was found to have acute hypoxemic respiratory failure.  An echo was performed and patient was found to have right-sided heart failure.  Patient has a history of sleep apnea and obesity.  Likely his CPAP is not as effective.  Patient diuresed and discharged home.  Patient recommended to follow-up for repeat sleep study    -Hypertension    -Sleep apnea    Past Medical History:   Diagnosis Date    Asthma     Congestive heart failure (HCC)     Diabetes (HCC)     Hypertension     Sleep apnea      Past Surgical History:   Procedure Laterality Date    KNEE ARTHROSCOPY  Left 2009     Family History   Problem Relation Age of Onset    Diabetes Mother     No Known Problems Sister      Social History     Socioeconomic History    Marital status:      Spouse name: Not on file    Number of children: Not on file    Years of education: Not on file    Highest education level: Not on file   Occupational History    Not on file   Tobacco Use    Smoking status: Never    Smokeless tobacco: Never    Tobacco comments:     vapes   Vaping Use    Vaping status: Former    Substances: THC    Devices: Disposable   Substance and Sexual Activity    Alcohol use: Not Currently    Drug use: Not Currently     Types: Inhaled, Marijuana     Comment: THC every day    Sexual activity: Not on file   Other Topics Concern    Not on file   Social History Narrative    Not on file     Social Drivers of Health     Financial Resource Strain: Not on file   Food Insecurity: Not on file   Transportation Needs: Not on file   Physical Activity: Not on file   Stress: Not on file   Social Connections: Not on file   Intimate Partner Violence: Not on file   Housing Stability: Not on file     No Known Allergies  Outpatient Encounter Medications as of 6/30/2025   Medication Sig Dispense Refill    Semaglutide, 2 MG/DOSE, (OZEMPIC, 2 MG/DOSE,) 8 MG/3ML Solution Pen-injector INJECT 2MG SUBCUTANEOUS EVERY WEEK 3 mL 6    metFORMIN (GLUCOPHAGE) 500 MG Tab Take 1 Tablet by mouth 2 times a day with meals. 180 Tablet 3    amLODIPine (NORVASC) 10 MG Tab Take 1 Tablet by mouth every day. 90 Tablet 3    carvedilol (COREG) 25 MG Tab Take 1 Tablet by mouth 2 times a day with meals. 180 Tablet 3    losartan (COZAAR) 100 MG Tab Take 1 Tablet by mouth every day. 90 Tablet 3    potassium chloride SA (KDUR) 20 MEQ Tab CR Take 1 Tablet by mouth 2 times a day. 180 Tablet 3    torsemide (DEMADEX) 100 MG Tab Take 1 tab (100 mg) in AM by mouth and 1/2 tab (50 mg) in afternoon (Patient taking differently: 150 mg. Take 1 tab (100 mg) in AM by mouth  "and 1/2 tab (50 mg) in afternoon) 135 Tablet 3    spironolactone (ALDACTONE) 50 MG Tab Take 1 Tablet by mouth every day. 90 Tablet 3    JARDIANCE 10 MG Tab tablet TAKE 1 TABLET BY MOUTH EVERY DAY 90 Tablet 3     No facility-administered encounter medications on file as of 6/30/2025.     Review of Systems   Constitutional:  Negative for fever and malaise/fatigue.   HENT:  Negative for congestion.    Respiratory:  Negative for cough and shortness of breath.    Cardiovascular:  Negative for chest pain, palpitations, orthopnea, claudication, leg swelling and PND.   Gastrointestinal:  Negative for abdominal pain.   Musculoskeletal:  Negative for myalgias.   Neurological:  Negative for dizziness.   Psychiatric/Behavioral:  The patient is not nervous/anxious and does not have insomnia.    All other systems reviewed and are negative.       Objective:   /71 (BP Location: Left arm, Patient Position: Sitting)   Pulse 87   Resp 20   Ht 1.626 m (5' 4\")   Wt (!) 136 kg (299 lb)   SpO2 94%   BMI 51.32 kg/m²     Physical Exam  Vitals reviewed.   Constitutional:       Appearance: He is well-developed. He is morbidly obese.   HENT:      Head: Normocephalic and atraumatic.   Eyes:      General: Lids are normal.      Pupils: Pupils are equal, round, and reactive to light.   Neck:      Vascular: No JVD.   Cardiovascular:      Rate and Rhythm: Normal rate and regular rhythm.      Heart sounds: Normal heart sounds.   Pulmonary:      Effort: Pulmonary effort is normal. No respiratory distress.      Breath sounds: Normal breath sounds. No wheezing or rales.   Abdominal:      General: Bowel sounds are normal.      Palpations: Abdomen is soft.   Musculoskeletal:         General: Normal range of motion.      Cervical back: Normal range of motion and neck supple.      Right lower leg: No edema.      Left lower leg: No edema.   Skin:     General: Skin is warm and dry.   Neurological:      General: No focal deficit present.      " Mental Status: He is alert and oriented to person, place, and time.   Psychiatric:         Speech: Speech normal.         Behavior: Behavior normal.         Thought Content: Thought content normal.         Judgment: Judgment normal.       Lab Results   Component Value Date/Time    CHOLSTRLTOT 135 10/28/2023 10:35 AM    LDL 70 10/28/2023 10:35 AM    HDL 37 (A) 10/28/2023 10:35 AM    TRIGLYCERIDE 140 10/28/2023 10:35 AM       Lab Results   Component Value Date/Time    SODIUM 135 12/14/2024 09:17 AM    POTASSIUM 4.2 12/14/2024 09:17 AM    CHLORIDE 97 12/14/2024 09:17 AM    CO2 27 12/14/2024 09:17 AM    GLUCOSE 62 (L) 12/14/2024 09:17 AM    BUN 25 (H) 12/14/2024 09:17 AM    CREATININE 1.54 (H) 12/14/2024 09:17 AM     Lab Results   Component Value Date/Time    ALKPHOSPHAT 104 (H) 10/28/2023 10:35 AM    ASTSGOT 15 10/28/2023 10:35 AM    ALTSGPT 16 10/28/2023 10:35 AM    TBILIRUBIN 0.4 10/28/2023 10:35 AM      Transthoracic Echo Report 6/18/2021  No prior study is available for comparison.   Normal left ventricular systolic function.   No evidence of valvular abnormality based on Doppler evaluation.   Severely dilated right ventricle. Normal right ventricular systolic function.  Unable to estimate pulmonary artery pressure due to an inadequate tricuspid regurgitant jet    Date 6MWT [facility]   6/30/2025 301 m in 6 minutes 4                     At 6 minute walk test evaluation, patient was able to complete 301 m during his 6 minute walk test. His O2 saturation at baseline was 94% and at the end of the test, the O2 saturation was 95%. He reported level 4 of dyspnea on Ary scale.  Patient was able to walk for 6 minutes 7 secs.  ML[facility]F 4     Assessment:     1. Right heart failure with preserved right ventricular function (HCC)        2. Morbid obesity with BMI of 50.0-59.9, adult (HCC)        3. HTN (hypertension), malignant        4. Obstructive sleep apnea        5. SOB (shortness of breath)                  Medical Decision  Making:  Today's Assessment / Status / Plan:   1.  Right-sided heart failure versus, obesity hypoventilation syndrome:  -Last echo from [facility] on 8/29/2023, EF 65%  -Based on physical examination findings, patient is euvolemic. No JVD, lungs are clear to auscultation, no pitting edema in bilateral lower extremities, no ascites.   -Continue torsemide 100 mg in the a.m. and 50 mg in the afternoon  -Continue spironolactone 50 mg daily  -Continue potassium 20 mEq twice a day   -Continue Jardiance 10 mg daily   -pt has lab testing due for PCP by August.  -Reinforced s/sx of worsening symptoms with patient and weight monitoring. Pt verbalizes understanding. Pt to call office or RTC if present.   -Continue low-sodium diet    2.  Sleep apnea:  -Continue BiPAP use  -Encouraged to follow up with Sleep medicine provider     3.  Obesity: BMI 51.32  -Congratulated patient on his current weight loss, encouraged continued weight loss    4.  Hypertension: Stable  -Pt to monitor BP at home. Alert out office if BP is consistently low < 90/50 or >180/90 or increasing.for recommendations  -Continue amlodipine 10 mg daily  -Continue carvedilol 25 mg twice a day  -Continue losartan 100 mg Daily  -Continue spironolactone 50 mg daily    FU in clinic in 6 months. Sooner if needed.    Patient verbalizes understanding and agrees with the plan of care.     PLEASE NOTE: This Note was created using voice recognition Software. I have made every reasonable attempt to correct obvious errors, but I expect that there are errors of grammar and possibly content that I did not discover before finalizing the note

## 2025-07-03 ENCOUNTER — TELEPHONE (OUTPATIENT)
Dept: CARDIOLOGY | Facility: MEDICAL CENTER | Age: 50
End: 2025-07-03
Payer: COMMERCIAL

## 2025-08-25 ENCOUNTER — HOSPITAL ENCOUNTER (OUTPATIENT)
Dept: LAB | Facility: MEDICAL CENTER | Age: 50
End: 2025-08-25
Attending: STUDENT IN AN ORGANIZED HEALTH CARE EDUCATION/TRAINING PROGRAM
Payer: COMMERCIAL

## 2025-08-25 DIAGNOSIS — E11.9 TYPE 2 DIABETES MELLITUS WITHOUT COMPLICATION, WITHOUT LONG-TERM CURRENT USE OF INSULIN (HCC): ICD-10-CM

## 2025-08-25 DIAGNOSIS — Z00.00 ANNUAL PHYSICAL EXAM: ICD-10-CM

## 2025-08-25 LAB
ALBUMIN SERPL BCP-MCNC: 4.1 G/DL (ref 3.2–4.9)
ALBUMIN/GLOB SERPL: 1.2 G/DL
ALP SERPL-CCNC: 107 U/L (ref 30–99)
ALT SERPL-CCNC: 12 U/L (ref 2–50)
ANION GAP SERPL CALC-SCNC: 12 MMOL/L (ref 7–16)
AST SERPL-CCNC: 20 U/L (ref 12–45)
BASOPHILS # BLD AUTO: 1 % (ref 0–1.8)
BASOPHILS # BLD: 0.07 K/UL (ref 0–0.12)
BILIRUB SERPL-MCNC: 0.4 MG/DL (ref 0.1–1.5)
BUN SERPL-MCNC: 20 MG/DL (ref 8–22)
CALCIUM ALBUM COR SERPL-MCNC: 9.3 MG/DL (ref 8.5–10.5)
CALCIUM SERPL-MCNC: 9.4 MG/DL (ref 8.5–10.5)
CHLORIDE SERPL-SCNC: 99 MMOL/L (ref 96–112)
CHOLEST SERPL-MCNC: 150 MG/DL (ref 100–199)
CO2 SERPL-SCNC: 27 MMOL/L (ref 20–33)
CREAT SERPL-MCNC: 1.53 MG/DL (ref 0.5–1.4)
CREAT UR-MCNC: 33.1 MG/DL
EOSINOPHIL # BLD AUTO: 0.37 K/UL (ref 0–0.51)
EOSINOPHIL NFR BLD: 5.3 % (ref 0–6.9)
ERYTHROCYTE [DISTWIDTH] IN BLOOD BY AUTOMATED COUNT: 43.1 FL (ref 35.9–50)
EST. AVERAGE GLUCOSE BLD GHB EST-MCNC: 111 MG/DL
FASTING STATUS PATIENT QL REPORTED: NORMAL
GFR SERPLBLD CREATININE-BSD FMLA CKD-EPI: 55 ML/MIN/1.73 M 2
GLOBULIN SER CALC-MCNC: 3.5 G/DL (ref 1.9–3.5)
GLUCOSE SERPL-MCNC: 83 MG/DL (ref 65–99)
HBA1C MFR BLD: 5.5 % (ref 4–5.6)
HCT VFR BLD AUTO: 43.4 % (ref 42–52)
HDLC SERPL-MCNC: 45 MG/DL
HGB BLD-MCNC: 14.1 G/DL (ref 14–18)
IMM GRANULOCYTES # BLD AUTO: 0.02 K/UL (ref 0–0.11)
IMM GRANULOCYTES NFR BLD AUTO: 0.3 % (ref 0–0.9)
LDLC SERPL CALC-MCNC: 92 MG/DL
LYMPHOCYTES # BLD AUTO: 2.31 K/UL (ref 1–4.8)
LYMPHOCYTES NFR BLD: 33.4 % (ref 22–41)
MCH RBC QN AUTO: 28.5 PG (ref 27–33)
MCHC RBC AUTO-ENTMCNC: 32.5 G/DL (ref 32.3–36.5)
MCV RBC AUTO: 87.7 FL (ref 81.4–97.8)
MICROALBUMIN UR-MCNC: <1.2 MG/DL
MICROALBUMIN/CREAT UR: NORMAL MG/G (ref 0–30)
MONOCYTES # BLD AUTO: 0.76 K/UL (ref 0–0.85)
MONOCYTES NFR BLD AUTO: 11 % (ref 0–13.4)
NEUTROPHILS # BLD AUTO: 3.39 K/UL (ref 1.82–7.42)
NEUTROPHILS NFR BLD: 49 % (ref 44–72)
NRBC # BLD AUTO: 0 K/UL
NRBC BLD-RTO: 0 /100 WBC (ref 0–0.2)
PLATELET # BLD AUTO: 295 K/UL (ref 164–446)
PMV BLD AUTO: 9.8 FL (ref 9–12.9)
POTASSIUM SERPL-SCNC: 3.9 MMOL/L (ref 3.6–5.5)
PROT SERPL-MCNC: 7.6 G/DL (ref 6–8.2)
RBC # BLD AUTO: 4.95 M/UL (ref 4.7–6.1)
SODIUM SERPL-SCNC: 138 MMOL/L (ref 135–145)
TRIGL SERPL-MCNC: 67 MG/DL (ref 0–149)
TSH SERPL DL<=0.005 MIU/L-ACNC: 2.12 UIU/ML (ref 0.38–5.33)
WBC # BLD AUTO: 6.9 K/UL (ref 4.8–10.8)

## 2025-08-25 PROCEDURE — 83036 HEMOGLOBIN GLYCOSYLATED A1C: CPT

## 2025-08-25 PROCEDURE — 80061 LIPID PANEL: CPT

## 2025-08-25 PROCEDURE — 82043 UR ALBUMIN QUANTITATIVE: CPT

## 2025-08-25 PROCEDURE — 85025 COMPLETE CBC W/AUTO DIFF WBC: CPT

## 2025-08-25 PROCEDURE — 80053 COMPREHEN METABOLIC PANEL: CPT

## 2025-08-25 PROCEDURE — 36415 COLL VENOUS BLD VENIPUNCTURE: CPT

## 2025-08-25 PROCEDURE — 82570 ASSAY OF URINE CREATININE: CPT

## 2025-08-25 PROCEDURE — 84443 ASSAY THYROID STIM HORMONE: CPT

## 2025-08-26 ENCOUNTER — TELEMEDICINE (OUTPATIENT)
Dept: MEDICAL GROUP | Facility: LAB | Age: 50
End: 2025-08-26
Payer: COMMERCIAL

## 2025-08-26 DIAGNOSIS — E11.9 TYPE 2 DIABETES MELLITUS WITHOUT COMPLICATION, WITHOUT LONG-TERM CURRENT USE OF INSULIN (HCC): Primary | ICD-10-CM

## 2025-08-26 DIAGNOSIS — Z12.5 PROSTATE CANCER SCREENING: ICD-10-CM

## 2025-08-26 ASSESSMENT — ENCOUNTER SYMPTOMS
CHILLS: 0
SHORTNESS OF BREATH: 0
FEVER: 0